# Patient Record
Sex: MALE | Race: WHITE | NOT HISPANIC OR LATINO | Employment: OTHER | URBAN - NONMETROPOLITAN AREA
[De-identification: names, ages, dates, MRNs, and addresses within clinical notes are randomized per-mention and may not be internally consistent; named-entity substitution may affect disease eponyms.]

---

## 2021-11-12 ENCOUNTER — ALLIED HEALTH/NURSE VISIT (OUTPATIENT)
Dept: FAMILY MEDICINE | Facility: OTHER | Age: 71
End: 2021-11-12
Attending: FAMILY MEDICINE
Payer: MEDICARE

## 2021-11-12 DIAGNOSIS — R52 BODY ACHES: Primary | ICD-10-CM

## 2021-11-12 PROCEDURE — C9803 HOPD COVID-19 SPEC COLLECT: HCPCS

## 2021-11-12 PROCEDURE — U0003 INFECTIOUS AGENT DETECTION BY NUCLEIC ACID (DNA OR RNA); SEVERE ACUTE RESPIRATORY SYNDROME CORONAVIRUS 2 (SARS-COV-2) (CORONAVIRUS DISEASE [COVID-19]), AMPLIFIED PROBE TECHNIQUE, MAKING USE OF HIGH THROUGHPUT TECHNOLOGIES AS DESCRIBED BY CMS-2020-01-R: HCPCS | Mod: ZL

## 2021-11-12 NOTE — PROGRESS NOTES
Patient here for Covid Testing. Symptomatic, Body aches.    Irene Guerra MA on 11/12/2021 at 2:20 PM

## 2021-11-15 LAB — SARS-COV-2 RNA RESP QL NAA+PROBE: NEGATIVE

## 2022-08-23 ENCOUNTER — OFFICE VISIT (OUTPATIENT)
Dept: FAMILY MEDICINE | Facility: OTHER | Age: 72
End: 2022-08-23
Attending: PHYSICIAN ASSISTANT
Payer: MEDICARE

## 2022-08-23 VITALS
SYSTOLIC BLOOD PRESSURE: 150 MMHG | RESPIRATION RATE: 16 BRPM | OXYGEN SATURATION: 96 % | HEART RATE: 96 BPM | DIASTOLIC BLOOD PRESSURE: 84 MMHG | TEMPERATURE: 97.8 F | WEIGHT: 244.8 LBS

## 2022-08-23 DIAGNOSIS — Z79.2 NEED FOR PROPHYLACTIC ANTIBIOTIC: ICD-10-CM

## 2022-08-23 DIAGNOSIS — S69.92XA FISH HOOK INJURY OF FINGER OF LEFT HAND, INITIAL ENCOUNTER: Primary | ICD-10-CM

## 2022-08-23 PROCEDURE — 250N000009 HC RX 250: Performed by: NURSE PRACTITIONER

## 2022-08-23 PROCEDURE — 99203 OFFICE O/P NEW LOW 30 MIN: CPT | Performed by: NURSE PRACTITIONER

## 2022-08-23 PROCEDURE — G0463 HOSPITAL OUTPT CLINIC VISIT: HCPCS

## 2022-08-23 PROCEDURE — G0463 HOSPITAL OUTPT CLINIC VISIT: HCPCS | Mod: 25

## 2022-08-23 PROCEDURE — 90715 TDAP VACCINE 7 YRS/> IM: CPT

## 2022-08-23 PROCEDURE — 90471 IMMUNIZATION ADMIN: CPT

## 2022-08-23 RX ORDER — CEPHALEXIN 500 MG/1
500 CAPSULE ORAL 2 TIMES DAILY
Qty: 10 CAPSULE | Refills: 0 | Status: SHIPPED | OUTPATIENT
Start: 2022-08-23 | End: 2022-08-28

## 2022-08-23 RX ORDER — NEOMYCIN/BACITRACIN/POLYMYXINB 3.5-400-5K
OINTMENT (GRAM) TOPICAL ONCE
Status: DISCONTINUED | OUTPATIENT
Start: 2022-08-23 | End: 2022-08-26 | Stop reason: CLARIF

## 2022-08-23 RX ORDER — LIDOCAINE HYDROCHLORIDE 10 MG/ML
5 INJECTION, SOLUTION EPIDURAL; INFILTRATION; INTRACAUDAL; PERINEURAL ONCE
Status: COMPLETED | OUTPATIENT
Start: 2022-08-23 | End: 2022-08-23

## 2022-08-23 RX ADMIN — LIDOCAINE HYDROCHLORIDE 5 ML: 10 INJECTION, SOLUTION EPIDURAL; INFILTRATION; INTRACAUDAL; PERINEURAL at 11:30

## 2022-08-23 ASSESSMENT — PAIN SCALES - GENERAL: PAINLEVEL: NO PAIN (0)

## 2022-08-23 NOTE — NURSING NOTE
Chief Complaint   Patient presents with     Injury     Fish hook in left thumb      Patient presents to the clinic today for a fish hook stuck in his left thumb.       Initial BP (!) 158/88 (BP Location: Right arm, Patient Position: Sitting, Cuff Size: Adult Large)   Pulse 96   Temp 97.8  F (36.6  C) (Tympanic)   Resp 16   Wt 111 kg (244 lb 12.8 oz)   SpO2 96%  There is no height or weight on file to calculate BMI.  Medication Reconciliation: complete    Rea Richardson LPN

## 2022-08-23 NOTE — PATIENT INSTRUCTIONS
TDAP updated today;   Prophylactic antibiotic (cephalexin twice daily x 5 days).   Keep site covered with ointment and bandage.

## 2022-08-23 NOTE — PROGRESS NOTES
ASSESSMENT/PLAN:    I have reviewed the nursing notes.  I have reviewed the findings, diagnosis, plan and need for follow up with the patient.    1. Fish hook injury of finger of left hand, initial encounter  - lidocaine (PF) (XYLOCAINE) 1 % injection 5 mL  - TDAP VACCINE (Adacel, Boostrix)  [[id number]]  - cephALEXin (KEFLEX) 500 MG capsule; Take 1 capsule (500 mg) by mouth 2 times daily for 5 days  Dispense: 10 capsule; Refill: 0  - neomycin-bacitracin-polymyxin (NEOSPORIN) ointment    2. Need for prophylactic antibiotic  - cephALEXin (KEFLEX) 500 MG capsule; Take 1 capsule (500 mg) by mouth 2 times daily for 5 days  Dispense: 10 capsule; Refill: 0  - neomycin-bacitracin-polymyxin (NEOSPORIN) ointment  Based on fishhook being deeply embedded into the thumb, opted to treat with prophylactic antibiotic.  Patient tolerated fishhook removal without issues, see below procedural note.    Discussed warning signs/symptoms indicative of need to f/u    Follow up if symptoms persist or worsen or concerns    I explained my diagnostic considerations and recommendations to the patient, who voiced understanding and agreement with the treatment plan. All questions were answered. We discussed potential side effects of any prescribed or recommended therapies, as well as expectations for response to treatments.    Ai Chapa NP  8/23/2022  10:52 AM    HPI:  Papo Gonzalez is a 72 year old male who presents to [facility] today for concerns of fish hook in left thumb.  This became embedded in his skin about 2 hours ago.  He did cut the end of the fishhook lure prior to coming in.  He is not diabetic, he is from out of the area.  [place] was contaminated, been used prior.  He is behind on his Tdap and agreeable to updating this today.    No past medical history on file.  No past surgical history on file.  Social History     Tobacco Use     Smoking status: Never Smoker     Smokeless tobacco: Never Used   Substance Use Topics      Alcohol use: Yes     No current outpatient medications on file.     No Known Allergies  Past medical history, past surgical history, current medications and allergies reviewed and accurate to the best of my knowledge.      ROS:  Refer to HPI    BP (!) 150/84 (BP Location: Right arm, Patient Position: Sitting, Cuff Size: Adult Large)   Pulse 96   Temp 97.8  F (36.6  C) (Tympanic)   Resp 16   Wt 111 kg (244 lb 12.8 oz)   SpO2 96%     EXAM:  General Appearance: Well appearing 72 year old male, appropriate appearance for age. No acute distress   Respiratory:  No increased work of breathing.  No cough appreciated.  Musculoskeletal:  Equal movement of bilateral upper extremities.  Equal movement of bilateral lower extremities.  Normal gait.    Left thumb: + fish hook odell embedded into pad of finger. Removed without difficulty. No drainage. Full range of motion of the thumb, full sensation.   Psychological: normal affect, alert, oriented, and pleasant.     Procedural note:   Options are discussed and Papo decided to proceed with fish hook removal. Patient was prepped and draped in the proper sterile manner. Wound cleansed with alcohol prep and irrigated well with saline. Anesthesia was obtained using 2 cc of lidocaine without epi. Fish hook removed using pull back technique. Patient tolerated procedure well, no complications appreciated. Sterile dressing applied with bacitracin. Wound dressing and care was discussed.  Given warning signs for infection and instructed to return if they develop. Patient was agreeable to this plan.

## 2022-10-03 ENCOUNTER — HEALTH MAINTENANCE LETTER (OUTPATIENT)
Age: 72
End: 2022-10-03

## 2022-11-30 ASSESSMENT — ENCOUNTER SYMPTOMS
WEAKNESS: 0
PALPITATIONS: 0
FREQUENCY: 0
COUGH: 0
SORE THROAT: 0
NAUSEA: 0
CHILLS: 0
ARTHRALGIAS: 1
DIARRHEA: 0
PARESTHESIAS: 0
HEMATURIA: 0
SHORTNESS OF BREATH: 0
DYSURIA: 0
NERVOUS/ANXIOUS: 0
MYALGIAS: 0
CONSTIPATION: 0
FEVER: 0
HEADACHES: 0
DIZZINESS: 0
JOINT SWELLING: 0
ABDOMINAL PAIN: 0
HEARTBURN: 0
HEMATOCHEZIA: 0
EYE PAIN: 0

## 2022-11-30 ASSESSMENT — ACTIVITIES OF DAILY LIVING (ADL): CURRENT_FUNCTION: NO ASSISTANCE NEEDED

## 2022-12-06 ENCOUNTER — OFFICE VISIT (OUTPATIENT)
Dept: FAMILY MEDICINE | Facility: OTHER | Age: 72
End: 2022-12-06
Attending: FAMILY MEDICINE
Payer: MEDICARE

## 2022-12-06 ENCOUNTER — HOSPITAL ENCOUNTER (OUTPATIENT)
Dept: GENERAL RADIOLOGY | Facility: OTHER | Age: 72
Discharge: HOME | End: 2022-12-06
Attending: FAMILY MEDICINE
Payer: MEDICARE

## 2022-12-06 VITALS
HEIGHT: 67 IN | BODY MASS INDEX: 37.98 KG/M2 | HEART RATE: 112 BPM | DIASTOLIC BLOOD PRESSURE: 90 MMHG | SYSTOLIC BLOOD PRESSURE: 146 MMHG | RESPIRATION RATE: 20 BRPM | WEIGHT: 242 LBS | TEMPERATURE: 98.8 F | OXYGEN SATURATION: 95 %

## 2022-12-06 DIAGNOSIS — Z00.00 ENCOUNTER FOR MEDICARE ANNUAL WELLNESS EXAM: Primary | ICD-10-CM

## 2022-12-06 DIAGNOSIS — M17.0 ARTHRITIS OF BOTH KNEES: ICD-10-CM

## 2022-12-06 DIAGNOSIS — N52.9 ERECTILE DYSFUNCTION, UNSPECIFIED ERECTILE DYSFUNCTION TYPE: ICD-10-CM

## 2022-12-06 DIAGNOSIS — Z86.0100 PERSONAL HISTORY OF COLONIC POLYPS: ICD-10-CM

## 2022-12-06 DIAGNOSIS — Z23 COVID-19 VACCINE ADMINISTERED: ICD-10-CM

## 2022-12-06 DIAGNOSIS — Z13.220 LIPID SCREENING: ICD-10-CM

## 2022-12-06 DIAGNOSIS — S21.202A WOUND OF LEFT SIDE OF BACK, INITIAL ENCOUNTER: ICD-10-CM

## 2022-12-06 DIAGNOSIS — Z13.6 SCREENING FOR AAA (ABDOMINAL AORTIC ANEURYSM): ICD-10-CM

## 2022-12-06 DIAGNOSIS — Z12.5 SCREENING FOR PROSTATE CANCER: ICD-10-CM

## 2022-12-06 DIAGNOSIS — R39.11 URINARY HESITANCY: ICD-10-CM

## 2022-12-06 DIAGNOSIS — Z83.3 FAMILY HISTORY OF DIABETES MELLITUS: ICD-10-CM

## 2022-12-06 LAB
ALBUMIN SERPL BCG-MCNC: 4.4 G/DL (ref 3.5–5.2)
ALP SERPL-CCNC: 74 U/L (ref 40–129)
ALT SERPL W P-5'-P-CCNC: 36 U/L (ref 10–50)
ANION GAP SERPL CALCULATED.3IONS-SCNC: 10 MMOL/L (ref 7–15)
AST SERPL W P-5'-P-CCNC: 26 U/L (ref 10–50)
BILIRUB SERPL-MCNC: 0.5 MG/DL
BUN SERPL-MCNC: 13.5 MG/DL (ref 8–23)
CALCIUM SERPL-MCNC: 9.1 MG/DL (ref 8.8–10.2)
CHLORIDE SERPL-SCNC: 102 MMOL/L (ref 98–107)
CHOLEST SERPL-MCNC: 187 MG/DL
CREAT SERPL-MCNC: 1.13 MG/DL (ref 0.67–1.17)
DEPRECATED HCO3 PLAS-SCNC: 26 MMOL/L (ref 22–29)
GFR SERPL CREATININE-BSD FRML MDRD: 70 ML/MIN/1.73M2
GLUCOSE SERPL-MCNC: 117 MG/DL (ref 70–99)
HBA1C MFR BLD: 5.9 % (ref 4–6.2)
HDLC SERPL-MCNC: 41 MG/DL
LDLC SERPL CALC-MCNC: 117 MG/DL
NONHDLC SERPL-MCNC: 146 MG/DL
POTASSIUM SERPL-SCNC: 4.3 MMOL/L (ref 3.4–5.3)
PROT SERPL-MCNC: 7.7 G/DL (ref 6.4–8.3)
PSA SERPL-MCNC: 3.3 NG/ML (ref 0–6.5)
SODIUM SERPL-SCNC: 138 MMOL/L (ref 136–145)
TRIGL SERPL-MCNC: 145 MG/DL

## 2022-12-06 PROCEDURE — G0463 HOSPITAL OUTPT CLINIC VISIT: HCPCS | Mod: 25

## 2022-12-06 PROCEDURE — G0103 PSA SCREENING: HCPCS | Mod: ZL | Performed by: FAMILY MEDICINE

## 2022-12-06 PROCEDURE — 36415 COLL VENOUS BLD VENIPUNCTURE: CPT | Mod: ZL | Performed by: FAMILY MEDICINE

## 2022-12-06 PROCEDURE — 84155 ASSAY OF PROTEIN SERUM: CPT | Mod: ZL | Performed by: FAMILY MEDICINE

## 2022-12-06 PROCEDURE — 99214 OFFICE O/P EST MOD 30 MIN: CPT | Mod: 25 | Performed by: FAMILY MEDICINE

## 2022-12-06 PROCEDURE — 73564 X-RAY EXAM KNEE 4 OR MORE: CPT | Mod: 50

## 2022-12-06 PROCEDURE — G0439 PPPS, SUBSEQ VISIT: HCPCS | Performed by: FAMILY MEDICINE

## 2022-12-06 PROCEDURE — 91312 COVID-19 VACCINE BIVALENT BOOSTER 12+ (PFIZER): CPT

## 2022-12-06 PROCEDURE — 83036 HEMOGLOBIN GLYCOSYLATED A1C: CPT | Mod: ZL | Performed by: FAMILY MEDICINE

## 2022-12-06 PROCEDURE — 80061 LIPID PANEL: CPT | Mod: ZL | Performed by: FAMILY MEDICINE

## 2022-12-06 RX ORDER — MUPIROCIN 20 MG/G
OINTMENT TOPICAL 2 TIMES DAILY
Qty: 30 G | Refills: 0 | Status: SHIPPED | OUTPATIENT
Start: 2022-12-06 | End: 2023-01-05

## 2022-12-06 RX ORDER — TADALAFIL 5 MG/1
5 TABLET ORAL DAILY PRN
Qty: 10 TABLET | Refills: 11 | Status: SHIPPED | OUTPATIENT
Start: 2022-12-06 | End: 2023-03-07

## 2022-12-06 ASSESSMENT — ENCOUNTER SYMPTOMS
CHILLS: 0
ARTHRALGIAS: 1
HEADACHES: 0
CONSTIPATION: 0
NERVOUS/ANXIOUS: 0
FREQUENCY: 0
HEARTBURN: 0
PARESTHESIAS: 0
HEMATURIA: 0
MYALGIAS: 0
SHORTNESS OF BREATH: 0
COUGH: 0
HEMATOCHEZIA: 0
DYSURIA: 0
DIZZINESS: 0
WEAKNESS: 0
SORE THROAT: 0
DIARRHEA: 0
PALPITATIONS: 0
ABDOMINAL PAIN: 0
NAUSEA: 0
FEVER: 0
JOINT SWELLING: 0
EYE PAIN: 0

## 2022-12-06 ASSESSMENT — ANXIETY QUESTIONNAIRES
2. NOT BEING ABLE TO STOP OR CONTROL WORRYING: NOT AT ALL
IF YOU CHECKED OFF ANY PROBLEMS ON THIS QUESTIONNAIRE, HOW DIFFICULT HAVE THESE PROBLEMS MADE IT FOR YOU TO DO YOUR WORK, TAKE CARE OF THINGS AT HOME, OR GET ALONG WITH OTHER PEOPLE: NOT DIFFICULT AT ALL
6. BECOMING EASILY ANNOYED OR IRRITABLE: NOT AT ALL
GAD7 TOTAL SCORE: 0
4. TROUBLE RELAXING: NOT AT ALL
3. WORRYING TOO MUCH ABOUT DIFFERENT THINGS: NOT AT ALL
5. BEING SO RESTLESS THAT IT IS HARD TO SIT STILL: NOT AT ALL
1. FEELING NERVOUS, ANXIOUS, OR ON EDGE: NOT AT ALL
8. IF YOU CHECKED OFF ANY PROBLEMS, HOW DIFFICULT HAVE THESE MADE IT FOR YOU TO DO YOUR WORK, TAKE CARE OF THINGS AT HOME, OR GET ALONG WITH OTHER PEOPLE?: NOT DIFFICULT AT ALL
GAD7 TOTAL SCORE: 0
7. FEELING AFRAID AS IF SOMETHING AWFUL MIGHT HAPPEN: NOT AT ALL
7. FEELING AFRAID AS IF SOMETHING AWFUL MIGHT HAPPEN: NOT AT ALL
GAD7 TOTAL SCORE: 0

## 2022-12-06 ASSESSMENT — ACTIVITIES OF DAILY LIVING (ADL): CURRENT_FUNCTION: NO ASSISTANCE NEEDED

## 2022-12-06 ASSESSMENT — PAIN SCALES - GENERAL: PAINLEVEL: MODERATE PAIN (4)

## 2022-12-06 ASSESSMENT — PATIENT HEALTH QUESTIONNAIRE - PHQ9
SUM OF ALL RESPONSES TO PHQ QUESTIONS 1-9: 1
SUM OF ALL RESPONSES TO PHQ QUESTIONS 1-9: 1
10. IF YOU CHECKED OFF ANY PROBLEMS, HOW DIFFICULT HAVE THESE PROBLEMS MADE IT FOR YOU TO DO YOUR WORK, TAKE CARE OF THINGS AT HOME, OR GET ALONG WITH OTHER PEOPLE: NOT DIFFICULT AT ALL

## 2022-12-06 NOTE — PATIENT INSTRUCTIONS
Start mupirocin ointment to help heal the back wound  See general surgery if not improving    Colonoscopy due with history of polyps    Referral to orthopedic surgery    Cialis as needed       Patient Education   Personalized Prevention Plan  You are due for the preventive services outlined below.  Your care team is available to assist you in scheduling these services.  If you have already completed any of these items, please share that information with your care team to update in your medical record.  Health Maintenance Due   Topic Date Due    Hepatitis C Screening  Never done    Cholesterol Lab  Never done    Zoster (Shingles) Vaccine (1 of 2) Never done    Annual Wellness Visit  Never done    Pneumococcal Vaccine (1 - PCV) Never done    AORTIC ANEURYSM SCREENING (SYSTEM ASSIGNED)  Never done    COVID-19 Vaccine (3 - Booster for Moderna series) 05/23/2021    Flu Vaccine (1) Never done       Exercise for a Healthier Heart  You may wonder how you can improve the health of your heart. If you re thinking about exercise, you re on the right track. You don t need to become an athlete. But you do need a certain amount of brisk exercise to help strengthen your heart. If you have been diagnosed with a heart condition, your healthcare provider may advise exercise to help stabilize your condition. To help make exercise a habit, choose safe, fun activities.      Exercise with a friend. When activity is fun, you're more likely to stick with it.   Before you start  Check with your healthcare provider before starting an exercise program. This is especially important if you have not been active for a while. It's also important if you have a long-term (chronic) health problem such as heart disease, diabetes, or obesity. Or if you are at high risk for having these problems.   Why exercise?  Exercising regularly offers many healthy rewards. It can help you do all of the following:   Improve your blood cholesterol level to help  prevent further heart trouble  Lower your blood pressure to help prevent a stroke or heart attack  Control diabetes, or reduce your risk of getting this disease  Improve your heart and lung function  Reach and stay at a healthy weight  Make your muscles stronger so you can stay active  Prevent falls and fractures by slowing the loss of bone mass (osteoporosis)  Manage stress better  Reduce your blood pressure  Improve your sense of self and your body image  Exercise tips    Ease into your routine. Set small goals. Then build on them. If you are not sure what your activity level should be, talk with your healthcare provider first before starting an exercise routine.  Exercise on most days. Aim for a total of 150 minutes (2 hours and 30 minutes) or more of moderate-intensity aerobic activity each week. Or 75 minutes (1 hour and 15 minutes) or more of vigorous-intensity aerobic activity each week. Or try for a combination of both. Moderate activity means that you breathe heavier and your heart rate increases but you can still talk. Think about doing 40 minutes of moderate exercise, 3 to 4 times a week. For best results, activity should last for about 40 minutes to lower blood pressure and cholesterol. It's OK to work up to the 40-minute period over time. Examples of moderate-intensity activity are walking 1 mile in 15 minutes. Or doing 30 to 45 minutes of yard work.  Step up your daily activity level.  Along with your exercise program, try being more active the whole day. Walk instead of drive. Or park further away so that you take more steps each day. Do more household tasks or yard work. You may not be able to meet the advised mount of physical activity. But doing some moderate- or vigorous-intensity aerobic activity can help reduce your risk for heart disease. Your healthcare provider can help you figure out what is best for you.  Choose 1 or more activities you enjoy.  Walking is one of the easiest things you can  do. You can also try swimming, riding a bike, dancing, or taking an exercise class.    When to call your healthcare provider  Call your healthcare provider if you have any of these:   Chest pain or feel dizzy or lightheaded  Burning, tightness, pressure, or heaviness in your chest, neck, shoulders, back, or arms  Abnormal shortness of breath  More joint or muscle pain  A very fast or irregular heartbeat (palpitations)  [employer] last reviewed this educational content on 7/1/2019 2000-2021 [employer]. All rights reserved. This information is not intended as a substitute for professional medical care. Always follow your healthcare professional's instructions.          Signs of Hearing Loss      Hearing much better with one ear can be a sign of hearing loss.   Hearing loss is a problem shared by many people. In fact, it is one of the most common health problems, particularly as people age. Most people age 65 and older have some hearing loss. By age 80, almost everyone does. Hearing loss often occurs slowly over the years. So you may not realize your hearing has gotten worse.  Have your hearing checked  Call your healthcare provider if you:  Have to strain to hear normal conversation  Have to watch other people s faces very carefully to follow what they re saying  Need to ask people to repeat what they ve said  Often misunderstand what people are saying  Turn the volume of the television or radio up so high that others complain  Feel that people are mumbling when they re talking to you  Find that the effort to hear leaves you feeling tired and irritated  Notice, when using the phone, that you hear better with one ear than the other  [employer] last reviewed this educational content on 1/1/2020 2000-2021 [employer]. All rights reserved. This information is not intended as a substitute for professional medical care. Always follow your healthcare professional's instructions.

## 2022-12-06 NOTE — NURSING NOTE
"Patient presents to the clinic for medicare wellness/[facility] care.    FOOD SECURITY SCREENING QUESTIONS:    The next two questions are to help us understand your food security.  If you are feeling you need any assistance in this area, we have resources available to support you today.    Hunger Vital Signs:  Within the past 12 months we worried whether our food would run out before we got money to buy more. Never  Within the past 12 months the food we bought just didn't last and we didn't have money to get more. Never    Advance Care Directive on file? no  Advance Care Directive provided to patient? Yes    Chief Complaint   Patient presents with     Establish Care Medicare Visit     Wellness       Derm Problem       Initial BP (!) 146/90   Pulse 112   Temp 98.8  F (37.1  C) (Tympanic)   Resp 20   Ht 1.689 m (5' 6.5\")   Wt 109.8 kg (242 lb)   SpO2 95%   BMI 38.47 kg/m   Estimated body mass index is 38.47 kg/m  as calculated from the following:    Height as of this encounter: 1.689 m (5' 6.5\").    Weight as of this encounter: 109.8 kg (242 lb).  Medication Reconciliation: complete        Clara Montes De Oca LPN       "

## 2022-12-06 NOTE — PROGRESS NOTES
"SUBJECTIVE:   Papo is a 72 year old who presents for Preventive Visit.    Patient has been advised of split billing requirements and indicates understanding: Yes  Are you in the first 12 months of your Medicare coverage?  No    Healthy Habits:     In general, how would you rate your overall health?  Good    Frequency of exercise:  None    Do you usually eat at least 4 servings of fruit and vegetables a day, include whole grains    & fiber and avoid regularly eating high fat or \"junk\" foods?  Yes    Taking medications regularly:  Not Applicable    Medication side effects:  Not applicable    Ability to successfully perform activities of daily living:  No assistance needed    Home Safety:  No safety concerns identified    Hearing Impairment:  Difficulty following a conversation in a noisy restaurant or crowded room    In the past 6 months, have you been bothered by leaking of urine?  No    In general, how would you rate your overall mental or emotional health?  Excellent      PHQ-2 Total Score: 0    Additional concerns today:  Yes    Answers for HPI/ROS submitted by the patient on 12/6/2022  If you checked off any problems, how difficult have these problems made it for you to do your work, take care of things at home, or get along with other people?: Not difficult at all  PHQ9 TOTAL SCORE: 1  ALE 7 TOTAL SCORE: 0      Have you ever done Advance Care Planning? (For example, a Health Directive, POLST, or a discussion with a medical provider or your loved ones about your wishes): No, advance care planning information given to patient to review.  Advanced care planning was discussed at today's visit.       Fall risk  Fallen 2 or more times in the past year?: No  Any fall with injury in the past year?: No    Cognitive Screening   1) Repeat 3 items (Leader, Season, Table)    2) Clock draw: NORMAL  3) 3 item recall: Recalls 3 objects  Results: 3 items recalled: COGNITIVE IMPAIRMENT LESS LIKELY    Mini-CogTM Copyright S " Kiara. Licensed by the author for use in [facility]; reprinted with permission (deven@[email or web address]). All rights reserved.      Do you have sleep apnea, excessive snoring or daytime drowsiness?: no    Reviewed and updated as needed this visit by clinical staff   Tobacco  Allergies  Meds  Problems  Med Hx  Surg Hx  Fam Hx  Soc   Hx        Reviewed and updated as needed this visit by Provider   Tobacco  Allergies  Meds  Problems  Med Hx  Surg Hx  Fam Hx         Social History     Tobacco Use     Smoking status: Never     Passive exposure: Never     Smokeless tobacco: Never   Substance Use Topics     Alcohol use: Yes     Comment: 1 beer per week         Alcohol Use 11/30/2022   Prescreen: >3 drinks/day or >7 drinks/week? No               Current providers sharing in care for this patient include:   Patient Care Team:  Zack Roa MD as PCP - General (Family Medicine)  Ai Chapa NP as Assigned PCP    The following health maintenance items are reviewed in Epic and correct as of today:  Health Maintenance   Topic Date Due     HEPATITIS C SCREENING  Never done     ZOSTER IMMUNIZATION (1 of 2) Never done     MEDICARE ANNUAL WELLNESS VISIT  Never done     Pneumococcal Vaccine: 65+ Years (1 - PCV) Never done     INFLUENZA VACCINE (1) Never done     FALL RISK ASSESSMENT  12/06/2023     COLORECTAL CANCER SCREENING  07/29/2026     LIPID  12/06/2027     ADVANCE CARE PLANNING  12/06/2027     DTAP/TDAP/TD IMMUNIZATION (2 - Td or Tdap) 08/23/2032     PHQ-2 (once per calendar year)  Completed     AORTIC ANEURYSM SCREENING (SYSTEM ASSIGNED)  Completed     COVID-19 Vaccine  Completed     IPV IMMUNIZATION  Aged Out     MENINGITIS IMMUNIZATION  Aged Out     There is no problem list on file for this patient.    Past Surgical History:   Procedure Laterality Date     COLONOSCOPY  07/29/2016    tubular adenomas, repeat 2021       Social History     Tobacco Use     Smoking status: Never     Passive exposure:  "Never     Smokeless tobacco: Never   Substance Use Topics     Alcohol use: Yes     Comment: 1 beer per week     Family History   Problem Relation Age of Onset     Dementia Mother      Prostate Cancer Father      Diabetes Sister      Prostate Cancer Paternal Uncle      Heart Disease No family hx of          Current Outpatient Medications   Medication Sig Dispense Refill     mupirocin (BACTROBAN) 2 % external ointment Apply topically 2 times daily for 30 days 30 g 0     tadalafil (CIALIS) 5 MG tablet Take 1 tablet (5 mg) by mouth daily as needed (ED) 10 tablet 11     No Known Allergies      Review of Systems   Constitutional: Negative for chills and fever.   HENT: Negative for congestion, ear pain, hearing loss and sore throat.    Eyes: Negative for pain and visual disturbance.   Respiratory: Negative for cough and shortness of breath.    Cardiovascular: Negative for chest pain, palpitations and peripheral edema.   Gastrointestinal: Negative for abdominal pain, constipation, diarrhea, heartburn, hematochezia and nausea.   Genitourinary: Positive for impotence and urgency. Negative for dysuria, frequency, genital sores, hematuria and penile discharge.   Musculoskeletal: Positive for arthralgias. Negative for joint swelling and myalgias.   Skin: Negative for rash.   Neurological: Negative for dizziness, weakness, headaches and paresthesias.   Psychiatric/Behavioral: Negative for mood changes. The patient is not nervous/anxious.        OBJECTIVE:   BP (!) 146/90   Pulse 112   Temp 98.8  F (37.1  C) (Tympanic)   Resp 20   Ht 1.689 m (5' 6.5\")   Wt 109.8 kg (242 lb)   SpO2 95%   BMI 38.47 kg/m   Estimated body mass index is 38.47 kg/m  as calculated from the following:    Height as of this encounter: 1.689 m (5' 6.5\").    Weight as of this encounter: 109.8 kg (242 lb).  Physical Exam  General Appearance: Pleasant, alert, appropriate appearance for age. No acute distress  Eye Exam:  Normal external eye, conjunctiva, " lids, cornea. RASHAUN.  Ear Exam: Normal TM's bilaterally. Normal auditory canals and external ears.  OroPharynx Exam:  Dental hygiene adequate. Normal buccal mucosa. Normal pharynx.  Neck Exam:  Supple, no masses or nodes.  Thyroid Exam: No nodules or enlargement.  Chest/Respiratory Exam: Normal chest wall and respirations. Clear to auscultation.  Cardiovascular Exam: Regular rate and rhythm. S1, S2, no murmur, click, gallop, or rubs.  Gastrointestinal Exam: Soft, non-tender, no masses or organomegaly.  Musculoskeletal Exam: Bowlegge, arthritic appearing  Foot Exam: Left and right foot: good pedal pulses.  Skin: Wound over a potential previous cyst on left low back  Neurologic Exam: Nonfocal, symmetric DTRs, normal gross motor, tone coordination and no tremor.  Psychiatric Exam: Alert and oriented - appropriate affect.          Results for orders placed or performed during the hospital encounter of 12/06/22   XR Knee Standing 2v Bilateral & 2v Bilateral     Status: None    Narrative    Bilateral knees    history: Bilateral knee pain    TECHNIQUE: 4 views of both knees were obtained.    FINDINGS: There is severe joint space narrowing at the medial femoral  tibial articulations bilaterally. The lateral femoral tibial and  patellofemoral articulations are normal in height. There is no knee  effusion. The distal femur patella proximal tibia and fibula are  intact.      Impression    IMPRESSION: Advanced osteoarthritic changes of the medial femoral  tibial articulations bilaterally    SHANTHI ELLIS MD         SYSTEM ID:  [id number]   Results for orders placed or performed in visit on 12/06/22   PSA Screen GH     Status: Normal   Result Value Ref Range    Prostate Specific Antigen Screen 3.30 0.00 - 6.50 ng/mL    Narrative    This result is obtained using the Roche Elecsys total PSA method on the maria ines [id number] immunoassay analyzer. Results obtained with different assay methods or kits cannot be used interchangeably.   Lipid  Panel     Status: Abnormal   Result Value Ref Range    Cholesterol 187 <200 mg/dL    Triglycerides 145 <150 mg/dL    Direct Measure HDL 41 >=40 mg/dL    LDL Cholesterol Calculated 117 (H) <=100 mg/dL    Non HDL Cholesterol 146 (H) <130 mg/dL    Narrative    Cholesterol  Desirable:  <200 mg/dL    Triglycerides  Normal:  Less than 150 mg/dL  Borderline High:  150-199 mg/dL  High:  200-499 mg/dL  Very High:  Greater than or equal to 500 mg/dL    Direct Measure HDL  Female:  Greater than or equal to 50 mg/dL   Male:  Greater than or equal to 40 mg/dL    LDL Cholesterol  Desirable:  <100mg/dL  Above Desirable:  100-129 mg/dL   Borderline High:  130-159 mg/dL   High:  160-189 mg/dL   Very High:  >= 190 mg/dL    Non HDL Cholesterol  Desirable:  130 mg/dL  Above Desirable:  130-159 mg/dL  Borderline High:  160-189 mg/dL  High:  190-219 mg/dL  Very High:  Greater than or equal to 220 mg/dL   Hemoglobin A1c     Status: Normal   Result Value Ref Range    Hemoglobin A1C 5.9 4.0 - 6.2 %   Comprehensive Metabolic Panel     Status: Abnormal   Result Value Ref Range    Sodium 138 136 - 145 mmol/L    Potassium 4.3 3.4 - 5.3 mmol/L    Chloride 102 98 - 107 mmol/L    Carbon Dioxide (CO2) 26 22 - 29 mmol/L    Anion Gap 10 7 - 15 mmol/L    Urea Nitrogen 13.5 8.0 - 23.0 mg/dL    Creatinine 1.13 0.67 - 1.17 mg/dL    Calcium 9.1 8.8 - 10.2 mg/dL    Glucose 117 (H) 70 - 99 mg/dL    Alkaline Phosphatase 74 40 - 129 U/L    AST 26 10 - 50 U/L    ALT 36 10 - 50 U/L    Protein Total 7.7 6.4 - 8.3 g/dL    Albumin 4.4 3.5 - 5.2 g/dL    Bilirubin Total 0.5 <=1.2 mg/dL    GFR Estimate 70 >60 mL/min/1.73m2        ASSESSMENT / PLAN:       ICD-10-CM    1. Encounter for Medicare annual wellness exam  Z00.00       2. Family history of diabetes mellitus  Z83.3 Hemoglobin A1c     Comprehensive Metabolic Panel     Hemoglobin A1c     Comprehensive Metabolic Panel      3. Lipid screening  Z13.220 Lipid Panel     Lipid Panel      4. Screening for prostate  cancer  Z12.5 PSA Screen GH     PSA Screen GH      5. Urinary hesitancy  R39.11       6. Erectile dysfunction, unspecified erectile dysfunction type  N52.9 tadalafil (CIALIS) 5 MG tablet      7. Personal history of colonic polyps  Z86.010 Colonoscopy Screening  Referral      8. Arthritis of both knees  M17.0 XR Knee Standing 2v Bilateral & 2v Bilateral     Orthopedic  Referral      9. Wound of left side of back, initial encounter  S21.202A mupirocin (BACTROBAN) 2 % external ointment     Adult General Surg Referral      10. Screening for AAA (abdominal aortic aneurysm)  Z13.6 Abdominal Aortic Aneurysm Screening/Tracking      11. COVID-19 vaccine administered  Z23 COVID-19 VACCINE BIVALENT BOOSTER 12+ (PFIZER)            Patient has been advised of split billing requirements and indicates understanding: Yes    Has not seen medical provider in a number of years.  Last physical 6 years ago.    A1c obtained due to family history of diabetes and is in the prediabetes range.  His wife is diabetic.  We discussed need to change diet.  She cooks 2 different types of food, one for her and 1 for him.  He should be following her diet.  Work on activity and weight loss.    Has family history of prostate cancer.  PSA comparable to previous.  Having some urinary symptoms.  We discussed Flomax, but he also has CAD.  PDE inhibitors interacts with alpha blockers.  He chooses to try tadalafil instead.  Is aware if symptoms worsen that he will need to start something for urinary hesitancy in order to avoid urinary retention.    Multiple tubular adenomas on colonoscopy in 2016.  Due for repeat colonoscopy.  Referral placed    He is bowlegged with knee x-ray showing expected loss of medial joint space.  We reviewed options for arthritis.  He qualifies for knee replacement and is most interested in pursuing this route.  Referral placed to orthopedics.    Has a nonhealing wound in the left low back.  Most consistent with  "sebaceous cyst that has never fully healed.  He has been trying Neosporin.  Recommend use of mupirocin twice daily on a consistent basis.  If it does not seem to be healing in the next month, he should see general surgery for excision.    COUNSELING:  Reviewed preventive health counseling, as reflected in patient instructions       Consider AAA screening for ages 65-75 and smoking history - no smoking history or family history       Regular exercise       Healthy diet/nutrition       Vision screening       Hearing screening       Declines hep C screen       Immunizations    Vaccinated for: Covid-19      Declines pneumonia and influenza vaccines    Recommend Shingrix, declines to pursue       Colon cancer screening - as above      Elevated ASCVD risk or, but primarily due to age.  With LDL of 117 does not warrant a statin.  Additionally, he is not inclined to start any medication.  The 10-year ASCVD risk score (Yasir CONNOR, et al., 2019) is: 23.4%    Values used to calculate the score:      Age: 72 years      Sex: Male      Is Non- : No      Diabetic: No      Tobacco smoker: No      Systolic Blood Pressure: 146 mmHg      Is BP treated: No      HDL Cholesterol: 41 mg/dL      Total Cholesterol: 187 mg/dL       BMI:   Estimated body mass index is 38.47 kg/m  as calculated from the following:    Height as of this encounter: 1.689 m (5' 6.5\").    Weight as of this encounter: 109.8 kg (242 lb).   Weight management plan: Discussed healthy diet and exercise guidelines      He reports that he has never smoked. He has never been exposed to tobacco smoke. He has never used smokeless tobacco.      Appropriate preventive services were discussed with this patient, including applicable screening as appropriate for cardiovascular disease, diabetes, osteopenia/osteoporosis, and glaucoma.  As appropriate for age/gender, discussed screening for colorectal cancer, prostate cancer, breast cancer, and cervical " cancer. Checklist reviewing preventive services available has been given to the patient.    Reviewed patients plan of care and provided an AVS. The Basic Care Plan (routine screening as documented in Health Maintenance) for Papo meets the Care Plan requirement. This Care Plan has been established and reviewed with the Patient.          Zack Roa MD  [facility]    Identified Health Risks:    He is at risk for lack of exercise and has been provided with information to increase physical activity for the benefit of his well-being.  The patient was provided with written information regarding signs of hearing loss.

## 2022-12-14 ENCOUNTER — OFFICE VISIT (OUTPATIENT)
Dept: SURGERY | Facility: OTHER | Age: 72
End: 2022-12-14
Attending: SURGERY
Payer: MEDICARE

## 2022-12-14 VITALS
TEMPERATURE: 97.6 F | DIASTOLIC BLOOD PRESSURE: 92 MMHG | WEIGHT: 239.4 LBS | BODY MASS INDEX: 38.06 KG/M2 | SYSTOLIC BLOOD PRESSURE: 160 MMHG | HEART RATE: 103 BPM | OXYGEN SATURATION: 99 % | RESPIRATION RATE: 20 BRPM

## 2022-12-14 DIAGNOSIS — L98.9 SKIN LESION OF BACK: Primary | ICD-10-CM

## 2022-12-14 PROBLEM — Z86.0101 H/O ADENOMATOUS POLYP OF COLON: Status: ACTIVE | Noted: 2022-12-14

## 2022-12-14 PROCEDURE — G0463 HOSPITAL OUTPT CLINIC VISIT: HCPCS

## 2022-12-14 PROCEDURE — 88305 TISSUE EXAM BY PATHOLOGIST: CPT

## 2022-12-14 PROCEDURE — 11603 EXC TR-EXT MAL+MARG 2.1-3 CM: CPT | Performed by: SURGERY

## 2022-12-14 PROCEDURE — 11403 EXC TR-EXT B9+MARG 2.1-3CM: CPT | Performed by: SURGERY

## 2022-12-14 ASSESSMENT — PAIN SCALES - GENERAL: PAINLEVEL: NO PAIN (0)

## 2022-12-14 NOTE — PROGRESS NOTES
Procedure Note  Pre/Post Operative Diagnosis:   2.2 cm ( with margins) ulcerated skin lesion left lower back    Procedure:    Excision    Surgeon: Papo Levi MD FACS    Local Anesthesia: 1% lidocaine with0.25%Marcaine with epinephrine    Indication for the procedure:    This is a 72 year old male patient referred by Dr Roa with an ulcerated skin lesion of left lower back x 2 years.   After explaining the risks to include bleeding, infection, recurrence or need for re-excision,and scarring the patient wished to proceed.    Procedure:   The area was prepped and draped in usual sterile fashion with ChloraPrep . After, adequate local anesthesia,an elliptical skin incision was made to encompass the lesion. Dermis was approximated with 3-0 Vicryl sutures. The skin was closed with 5-0 Maxon.  A proxi-strip and a clean dry dressing was applied.    Plan:   Patient will followup if there any problems with the wound including redness or drainage.

## 2022-12-14 NOTE — NURSING NOTE
"Chief Complaint   Patient presents with     Derm Problem     Here today with a sore on his left upper buttocks.  This has been present for about two years.       Initial BP (!) 150/96 (BP Location: Left arm, Patient Position: Sitting, Cuff Size: Adult Large)   Pulse 103   Temp 97.6  F (36.4  C) (Tympanic)   Resp 20   Wt 108.6 kg (239 lb 6.4 oz)   SpO2 99%   BMI 38.06 kg/m   Estimated body mass index is 38.06 kg/m  as calculated from the following:    Height as of 12/6/22: 1.689 m (5' 6.5\").    Weight as of this encounter: 108.6 kg (239 lb 6.4 oz).  Medication Reconciliation: complete    Yuki Bueno LPN  "

## 2022-12-14 NOTE — NURSING NOTE
"Chief Complaint   Patient presents with     Derm Problem     Here today with a sore on his left upper buttocks.  This has been present for about two years.       Initial BP (!) 160/92 (BP Location: Left arm, Patient Position: Sitting, Cuff Size: Adult Large)   Pulse 103   Temp 97.6  F (36.4  C) (Tympanic)   Resp 20   Wt 108.6 kg (239 lb 6.4 oz)   SpO2 99%   BMI 38.06 kg/m   Estimated body mass index is 38.06 kg/m  as calculated from the following:    Height as of 12/6/22: 1.689 m (5' 6.5\").    Weight as of this encounter: 108.6 kg (239 lb 6.4 oz).  Medication Reconciliation: complete    Yuki Bueno LPN     TIMEOUT  [place] Protocol    A. Pre-procedure verification complete     1-relevant information / documentation available, reviewed and properly matched to the patient; 2-consent accurate and complete, 3-equipment and supplies available    B. Site marking complete     Site marked if not in continuous attendance with patient    C. TIME OUT completed     Time Out was conducted just prior to starting procedure to verify the eight required elements: 1-patient identity, 2-consent accurate and complete, 3-position, 4-correct side/site marked (if applicable), 5-procedure, 6-relevant images / results properly labeled and displayed (if applicable), 7-antibiotics / irrigation fluids (if applicable), 8-safety precautions.  "

## 2022-12-14 NOTE — PATIENT INSTRUCTIONS
Your incision was closed with stitches that will dissolve.    It is ok to remove the dressing and get the incision wet in the shower on the day after your procedure.     Don't soak in a tub, pool or lake for 1 week.  The small butterfly strips will start to peel off in 5-7 days it is ok to remove them. If you have concerns, please call.

## 2022-12-21 PROBLEM — Z85.828 H/O BASAL CELL CARCINOMA EXCISION: Status: ACTIVE | Noted: 2022-12-14

## 2022-12-21 PROBLEM — Z98.890 H/O BASAL CELL CARCINOMA EXCISION: Status: ACTIVE | Noted: 2022-12-14

## 2022-12-21 LAB
PATH REPORT.COMMENTS IMP SPEC: NORMAL
PATH REPORT.FINAL DX SPEC: NORMAL
PHOTO IMAGE: NORMAL

## 2023-01-24 ENCOUNTER — OFFICE VISIT (OUTPATIENT)
Dept: ORTHOPEDICS | Facility: OTHER | Age: 73
End: 2023-01-24
Attending: ORTHOPAEDIC SURGERY
Payer: MEDICARE

## 2023-01-24 VITALS
OXYGEN SATURATION: 98 % | SYSTOLIC BLOOD PRESSURE: 142 MMHG | WEIGHT: 239 LBS | HEART RATE: 116 BPM | DIASTOLIC BLOOD PRESSURE: 102 MMHG | BODY MASS INDEX: 38 KG/M2

## 2023-01-24 DIAGNOSIS — M25.562 CHRONIC PAIN OF BOTH KNEES: Primary | ICD-10-CM

## 2023-01-24 DIAGNOSIS — M25.561 CHRONIC PAIN OF BOTH KNEES: Primary | ICD-10-CM

## 2023-01-24 DIAGNOSIS — G89.29 CHRONIC PAIN OF BOTH KNEES: Primary | ICD-10-CM

## 2023-01-24 PROCEDURE — 99204 OFFICE O/P NEW MOD 45 MIN: CPT | Performed by: ORTHOPAEDIC SURGERY

## 2023-01-24 PROCEDURE — G0463 HOSPITAL OUTPT CLINIC VISIT: HCPCS

## 2023-01-24 ASSESSMENT — PAIN SCALES - GENERAL: PAINLEVEL: MODERATE PAIN (5)

## 2023-01-24 NOTE — PROGRESS NOTES
Visit Date: 01/24/2023    REASON FOR EVALUATION:  Bilateral knee pain.    HISTORY OF PRESENT ILLNESS:  Papo comes in with bilateral knee pain.  It has been ongoing here for multiple years, getting worse over the past year or thereabouts. Left knee more symptomatic than right, worse with activity, better with rest.  Has tried cortisone previously; it was ineffective for him.  Does occasional anti-inflammatories.  He is here for next steps and recommendations.  Had x-rays done that show bone-on-bone arthrosis, medial compartment, as well as patellofemoral joint.  Denies any issues with blood clotting in the past.  Has not had any issues with surgeries as well.    MEDICATIONS:  Reviewed.    ALLERGIES:  NO KNOWN DRUG ALLERGIES.    REVIEW OF SYSTEMS:  A 12-point otherwise negative with the exception as stated above.    PHYSICAL EXAMINATION:  The patient is 5 feet 6 inches, 239 pounds.  Alert and oriented x 3, cooperative with exam, in no acute distress.  Does ambulate with some gait antalgia.  Affect is appropriate as well.  Examination of both knee shows varus deformity.  Range of motion -5 to 120.  Limb exam stable and balanced.  Neurovascular examination intact.  Kneecap tracking is acceptable, but with significant crepitation here at this point in time.  Overall, quad strength is otherwise 5/5 here as well in addition to that.    IMAGING:  X-rays reviewed show end-stage arthrosis, medial compartment, as well as patellofemoral joint.    IMPRESSION:  Bilateral knee osteoarthrosis, severe.    PLAN:  At this time, recommendation is that he proceed forward with joint reconstruction.  Plan for left side to start with a Qiana Persona posterior stabilized, cemented in nature.  We will look at spring timeframe for that and then likely look at the fall for his contralateral knee.  Aspirin for DVT prophylaxis.  Outpatient therapy at [facility].  All questions answered as it pertains to that intervention.    Caleb Garcia,  MD        D: 2023   T: 2023   MT: MARA    Name:     YESENIA FONG  MRN:      [id number]        Account:    [id number]   :      1950           Visit Date: 2023     Document: [id number]

## 2023-01-24 NOTE — PROGRESS NOTES
Patient is here for consult on his knees.  Frieda Corona LPN .....................1/24/2023 10:45 AM

## 2023-03-06 NOTE — PROGRESS NOTES
[facility] AND [facility]  [address]  [place] [address]  Phone: [phone]  Fax: [phone]  Primary Provider: Zack Roa  Pre-op Performing Provider: ZACK ROA      PREOPERATIVE EVALUATION:  Today's date: 3/7/2023    Papo Gonzalez is a 72 year old male who presents for a preoperative evaluation.    Surgical Information:  Surgery/Procedure: ARTHROPLASTY, KNEE, TOTAL  Surgery Location: [facility]  Surgeon: Radha  Surgery Date: 3-21-23  Time of Surgery: unknown  Where patient plans to recover: At home with family  Fax number for surgical facility: Note does not need to be faxed, will be available electronically in Epic.    Type of Anesthesia Anticipated: Spinal with Block    Assessment & Plan     The proposed surgical procedure is considered INTERMEDIATE risk.      ICD-10-CM    1. Pre-operative clearance  Z01.818 EKG 12-lead, tracing only     CBC W PLT No Diff     CBC W PLT No Diff      2. Arthritis of both knees  M17.0       3. Urinary hesitancy  R39.11 tamsulosin (FLOMAX) 0.4 MG capsule      4. H/O adenomatous polyp of colon  Z86.010       5. Prediabetes  R73.03           Urinary urgency and hesitancy. This makes post-op urinary retention more likely. Recommend starting tamsulosin 0.4 mg daily to help symptoms and theoretically reduce need for a catheter after joint replacement.     History of adenomatous polyp. He is due for colonoscopy, but deferred until after joint replacement. Reviewed with ortho if acceptable order, should be fine to have TKA and then colonoscopy without increased risk for joint infection    A1c 5.9% on last labs. He is working to modify diet.    Risks and Recommendations:  The patient has the following additional risks and recommendations for perioperative complications:   - No identified additional risk factors other than previously addressed    Medication Instructions:  Patient is on no chronic medications   Tamsulosin just started, may continue  through surgery    RECOMMENDATION:  APPROVAL GIVEN to proceed with proposed procedure, without further diagnostic evaluation.    Zack Roa MD         Subjective     HPI related to upcoming procedure:   bilateral knee arthritis  Planning left side now, right in the fall    Never picked up cialis due to cost  Has a lot of urinary hesitency    Preop Questions 2/28/2023   1. Have you ever had a heart attack or stroke? No   2. Have you ever had surgery on your heart or blood vessels, such as a stent placement, a coronary artery bypass, or surgery on an artery in your head, neck, heart, or legs? No   3. Do you have chest pain with activity? No   4. Do you have a history of  heart failure? No   5. Do you currently have a cold, bronchitis or symptoms of other infection? No   6. Do you have a cough, shortness of breath, or wheezing? No   7. Do you or anyone in your family have previous history of blood clots? No   8. Do you or does anyone in your family have a serious bleeding problem such as prolonged bleeding following surgeries or cuts? No   9. Have you ever had problems with anemia or been told to take iron pills? No   10. Have you had any abnormal blood loss such as black, tarry or bloody stools? No   11. Have you ever had a blood transfusion? No   12. Are you willing to have a blood transfusion if it is medically needed before, during, or after your surgery? Yes   13. Have you or any of your relatives ever had problems with anesthesia? No   14. Do you have sleep apnea, excessive snoring or daytime drowsiness? No   15. Do you have any artifical heart valves or other implanted medical devices like a pacemaker, defibrillator, or continuous glucose monitor? No   16. Do you have artificial joints? No   17. Are you allergic to latex? No       Preoperative Review of :   reviewed - no record of controlled substances prescribed.      Review of Systems  General: Denies general constitutional problems  Cardiovascular:  "Denies problems  Respiratory: Denies problems     Patient Active Problem List    Diagnosis Date Noted     H/O adenomatous polyp of colon 12/14/2022     Priority: Medium     H/O basal cell carcinoma excision 12/14/2022     Priority: Medium      No past medical history on file.  Past Surgical History:   Procedure Laterality Date     COLONOSCOPY  07/29/2016    tubular adenomas, repeat 2021     Current Outpatient Medications   Medication Sig Dispense Refill     tadalafil (CIALIS) 5 MG tablet Take 1 tablet (5 mg) by mouth daily as needed (ED) (Patient not taking: Reported on 1/24/2023) 10 tablet 11       No Known Allergies     Social History     Tobacco Use     Smoking status: Never     Passive exposure: Never     Smokeless tobacco: Never   Substance Use Topics     Alcohol use: Yes     Comment: 1 beer per week     Family History   Problem Relation Age of Onset     Dementia Mother      Prostate Cancer Father      Diabetes Sister      Prostate Cancer Paternal Uncle      Heart Disease No family hx of      History   Drug Use Unknown         Objective     BP (!) 144/92 (BP Location: Right arm, Patient Position: Sitting, Cuff Size: Adult Large)   Pulse 89   Temp 98.5  F (36.9  C) (Tympanic)   Resp 18   Ht 1.683 m (5' 6.25\")   Wt 107.5 kg (237 lb)   .3 cm (66.25\")   SpO2 95%   BMI 37.96 kg/m      Physical Exam  General Appearance: Pleasant, alert, appropriate appearance for age. No acute distress  Ear Exam: Normal TM's bilaterally.   OroPharynx Exam: Dental hygiene adequate. Normal buccal mucosa. Normal pharynx. Mallampati 2/4.  Neck Exam: Supple, no masses or nodes.  Chest/Respiratory Exam: Normal chest wall and respirations. Clear to auscultation.  Cardiovascular Exam: Regular rate and rhythm. S1, S2, no murmur, click, gallop, or rubs.  Extremities: 2 + pedal pulses.  No lower extremity edema.  Neurologic Exam: Nonfocal; symmetric DTRs, normal gross motor movement, tone, and coordination. No tremor.   " Psychiatric: Normal affect and mentation      Recent Labs   Lab Test 12/06/22  1129      POTASSIUM 4.3   CR 1.13   A1C 5.9        Diagnostics:  Results for orders placed or performed in visit on 03/07/23   CBC W PLT No Diff     Status: Normal   Result Value Ref Range    WBC Count 7.6 4.0 - 11.0 10e3/uL    RBC Count 5.33 4.40 - 5.90 10e6/uL    Hemoglobin 16.9 13.3 - 17.7 g/dL    Hematocrit 49.7 40.0 - 53.0 %    MCV 93 78 - 100 fL    MCH 31.7 26.5 - 33.0 pg    MCHC 34.0 31.5 - 36.5 g/dL    RDW 12.7 10.0 - 15.0 %    Platelet Count 239 150 - 450 10e3/uL   Extra Tube     Status: None    Narrative    The following orders were created for panel order Extra Tube.  Procedure                               Abnormality         Status                     ---------                               -----------         ------                     Extra Serum Separator Tu...[[id number]]                      Final result                 Please view results for these tests on the individual orders.   Extra Serum Separator Tube (SST)     Status: None   Result Value Ref Range    Hold Specimen x    EKG 12-lead, tracing only     Status: None   Result Value Ref Range    Systolic Blood Pressure  mmHg    Diastolic Blood Pressure  mmHg    Ventricular Rate 82 BPM    Atrial Rate 82 BPM    CT Interval 138 ms    QRS Duration 90 ms     ms    QTc 420 ms    P Axis 35 degrees    R AXIS -3 degrees    T Axis 90 degrees    Interpretation ECG       Sinus rhythm  Nonspecific T wave abnormality  Abnormal ECG  No previous ECGs available  Confirmed by DO CANO STACY ([id number]) on 3/9/2023 6:29:13 AM     No previous ECG available        Revised Cardiac Risk Index (RCRI):  The patient has the following serious cardiovascular risks for perioperative complications:   - No serious cardiac risks = 0 points     RCRI Interpretation: 0 points: Class I (very low risk - 0.4% complication rate)           Signed Electronically by: Zack Roa MD  Copy of this  evaluation report is provided to requesting physician.

## 2023-03-07 ENCOUNTER — OFFICE VISIT (OUTPATIENT)
Dept: FAMILY MEDICINE | Facility: OTHER | Age: 73
End: 2023-03-07
Attending: FAMILY MEDICINE
Payer: MEDICARE

## 2023-03-07 VITALS
SYSTOLIC BLOOD PRESSURE: 144 MMHG | WEIGHT: 237 LBS | RESPIRATION RATE: 18 BRPM | DIASTOLIC BLOOD PRESSURE: 92 MMHG | OXYGEN SATURATION: 95 % | BODY MASS INDEX: 38.09 KG/M2 | HEART RATE: 89 BPM | HEIGHT: 66 IN | TEMPERATURE: 98.5 F

## 2023-03-07 DIAGNOSIS — R73.03 PREDIABETES: ICD-10-CM

## 2023-03-07 DIAGNOSIS — R39.11 URINARY HESITANCY: ICD-10-CM

## 2023-03-07 DIAGNOSIS — M17.0 ARTHRITIS OF BOTH KNEES: ICD-10-CM

## 2023-03-07 DIAGNOSIS — Z01.818 PRE-OPERATIVE CLEARANCE: Primary | ICD-10-CM

## 2023-03-07 DIAGNOSIS — Z86.0101 H/O ADENOMATOUS POLYP OF COLON: ICD-10-CM

## 2023-03-07 LAB
ERYTHROCYTE [DISTWIDTH] IN BLOOD BY AUTOMATED COUNT: 12.7 % (ref 10–15)
HCT VFR BLD AUTO: 49.7 % (ref 40–53)
HGB BLD-MCNC: 16.9 G/DL (ref 13.3–17.7)
HOLD SPECIMEN: NORMAL
MCH RBC QN AUTO: 31.7 PG (ref 26.5–33)
MCHC RBC AUTO-ENTMCNC: 34 G/DL (ref 31.5–36.5)
MCV RBC AUTO: 93 FL (ref 78–100)
PLATELET # BLD AUTO: 239 10E3/UL (ref 150–450)
RBC # BLD AUTO: 5.33 10E6/UL (ref 4.4–5.9)
WBC # BLD AUTO: 7.6 10E3/UL (ref 4–11)

## 2023-03-07 PROCEDURE — 85027 COMPLETE CBC AUTOMATED: CPT | Mod: ZL | Performed by: FAMILY MEDICINE

## 2023-03-07 PROCEDURE — 93005 ELECTROCARDIOGRAM TRACING: CPT | Performed by: FAMILY MEDICINE

## 2023-03-07 PROCEDURE — 36415 COLL VENOUS BLD VENIPUNCTURE: CPT | Mod: ZL | Performed by: FAMILY MEDICINE

## 2023-03-07 PROCEDURE — 99214 OFFICE O/P EST MOD 30 MIN: CPT | Performed by: FAMILY MEDICINE

## 2023-03-07 PROCEDURE — G0463 HOSPITAL OUTPT CLINIC VISIT: HCPCS

## 2023-03-07 PROCEDURE — 93010 ELECTROCARDIOGRAM REPORT: CPT | Performed by: INTERNAL MEDICINE

## 2023-03-07 RX ORDER — TAMSULOSIN HYDROCHLORIDE 0.4 MG/1
0.4 CAPSULE ORAL DAILY
Qty: 90 CAPSULE | Refills: 0 | Status: SHIPPED | OUTPATIENT
Start: 2023-03-07 | End: 2023-06-15

## 2023-03-07 ASSESSMENT — PAIN SCALES - GENERAL: PAINLEVEL: MODERATE PAIN (5)

## 2023-03-07 NOTE — NURSING NOTE
Pt presents to clinic today for pre-op exam for TKA of left knee. Pain is only when walking in both knees.      FOOD SECURITY SCREENING QUESTIONS:    The next two questions are to help us understand your food security.  If you are feeling you need any assistance in this area, we have resources available to support you today.    Hunger Vital Signs:  Within the past 12 months we worried whether our food would run out before we got money to buy more. Never  Within the past 12 months the food we bought just didn't last and we didn't have money to get more. Never        Advance care directive on file? no  Advance care directive provided to patient? Working on at home     Medication Reconciliation: complete  Merle Foster LPN,LPN on 3/7/2023 at 1:22 PM

## 2023-03-07 NOTE — PATIENT INSTRUCTIONS
Start tamsulosin 0.4 mg daily to help urinary flow  Tylenol (acetaminophen) is safe  No ibuprofen or aspirin for a week prior to surgery

## 2023-03-09 LAB
ATRIAL RATE - MUSE: 82 BPM
DIASTOLIC BLOOD PRESSURE - MUSE: NORMAL MMHG
INTERPRETATION ECG - MUSE: NORMAL
P AXIS - MUSE: 35 DEGREES
PR INTERVAL - MUSE: 138 MS
QRS DURATION - MUSE: 90 MS
QT - MUSE: 360 MS
QTC - MUSE: 420 MS
R AXIS - MUSE: -3 DEGREES
SYSTOLIC BLOOD PRESSURE - MUSE: NORMAL MMHG
T AXIS - MUSE: 90 DEGREES
VENTRICULAR RATE- MUSE: 82 BPM

## 2023-03-10 PROBLEM — R73.03 PREDIABETES: Status: ACTIVE | Noted: 2023-03-10

## 2023-03-20 ENCOUNTER — ANESTHESIA EVENT (OUTPATIENT)
Dept: SURGERY | Facility: OTHER | Age: 73
End: 2023-03-20
Payer: MEDICARE

## 2023-03-21 ENCOUNTER — APPOINTMENT (OUTPATIENT)
Dept: OCCUPATIONAL THERAPY | Facility: OTHER | Age: 73
End: 2023-03-21
Attending: ORTHOPAEDIC SURGERY
Payer: MEDICARE

## 2023-03-21 ENCOUNTER — ANESTHESIA (OUTPATIENT)
Dept: SURGERY | Facility: OTHER | Age: 73
End: 2023-03-21
Payer: MEDICARE

## 2023-03-21 ENCOUNTER — HOSPITAL ENCOUNTER (OUTPATIENT)
Facility: OTHER | Age: 73
Discharge: HOME | End: 2023-03-22
Attending: ORTHOPAEDIC SURGERY | Admitting: ORTHOPAEDIC SURGERY
Payer: MEDICARE

## 2023-03-21 ENCOUNTER — APPOINTMENT (OUTPATIENT)
Dept: GENERAL RADIOLOGY | Facility: OTHER | Age: 73
End: 2023-03-21
Attending: ORTHOPAEDIC SURGERY
Payer: MEDICARE

## 2023-03-21 ENCOUNTER — APPOINTMENT (OUTPATIENT)
Dept: PHYSICAL THERAPY | Facility: OTHER | Age: 73
End: 2023-03-21
Attending: ORTHOPAEDIC SURGERY
Payer: MEDICARE

## 2023-03-21 DIAGNOSIS — M25.562 CHRONIC PAIN OF LEFT KNEE: Primary | ICD-10-CM

## 2023-03-21 DIAGNOSIS — G89.29 CHRONIC PAIN OF LEFT KNEE: Primary | ICD-10-CM

## 2023-03-21 PROBLEM — M17.0 PRIMARY OSTEOARTHRITIS OF BOTH KNEES: Status: ACTIVE | Noted: 2023-03-21

## 2023-03-21 PROBLEM — Z96.652 STATUS POST TOTAL LEFT KNEE REPLACEMENT: Status: ACTIVE | Noted: 2023-03-21

## 2023-03-21 LAB — LACTATE SERPL-SCNC: 1.3 MMOL/L (ref 0.7–2)

## 2023-03-21 PROCEDURE — 64450 NJX AA&/STRD OTHER PN/BRANCH: CPT | Mod: LT | Performed by: NURSE ANESTHETIST, CERTIFIED REGISTERED

## 2023-03-21 PROCEDURE — 250N000009 HC RX 250: Performed by: NURSE ANESTHETIST, CERTIFIED REGISTERED

## 2023-03-21 PROCEDURE — 97161 PT EVAL LOW COMPLEX 20 MIN: CPT | Mod: GP

## 2023-03-21 PROCEDURE — 272N000001 HC OR GENERAL SUPPLY STERILE: Performed by: ORTHOPAEDIC SURGERY

## 2023-03-21 PROCEDURE — 36415 COLL VENOUS BLD VENIPUNCTURE: CPT | Performed by: ORTHOPAEDIC SURGERY

## 2023-03-21 PROCEDURE — 97165 OT EVAL LOW COMPLEX 30 MIN: CPT | Mod: GO | Performed by: OCCUPATIONAL THERAPIST

## 2023-03-21 PROCEDURE — 999N000141 HC STATISTIC PRE-PROCEDURE NURSING ASSESSMENT: Performed by: ORTHOPAEDIC SURGERY

## 2023-03-21 PROCEDURE — C1776 JOINT DEVICE (IMPLANTABLE): HCPCS | Performed by: ORTHOPAEDIC SURGERY

## 2023-03-21 PROCEDURE — 250N000011 HC RX IP 250 OP 636: Performed by: NURSE ANESTHETIST, CERTIFIED REGISTERED

## 2023-03-21 PROCEDURE — 27447 TOTAL KNEE ARTHROPLASTY: CPT | Performed by: NURSE ANESTHETIST, CERTIFIED REGISTERED

## 2023-03-21 PROCEDURE — 96375 TX/PRO/DX INJ NEW DRUG ADDON: CPT

## 2023-03-21 PROCEDURE — 258N000003 HC RX IP 258 OP 636: Performed by: NURSE ANESTHETIST, CERTIFIED REGISTERED

## 2023-03-21 PROCEDURE — 99214 OFFICE O/P EST MOD 30 MIN: CPT | Performed by: FAMILY MEDICINE

## 2023-03-21 PROCEDURE — 258N000001 HC RX 258: Performed by: ORTHOPAEDIC SURGERY

## 2023-03-21 PROCEDURE — 250N000013 HC RX MED GY IP 250 OP 250 PS 637: Performed by: ORTHOPAEDIC SURGERY

## 2023-03-21 PROCEDURE — 710N000010 HC RECOVERY PHASE 1, LEVEL 2, PER MIN: Performed by: ORTHOPAEDIC SURGERY

## 2023-03-21 PROCEDURE — 250N000011 HC RX IP 250 OP 636: Performed by: ORTHOPAEDIC SURGERY

## 2023-03-21 PROCEDURE — 370N000017 HC ANESTHESIA TECHNICAL FEE, PER MIN: Performed by: ORTHOPAEDIC SURGERY

## 2023-03-21 PROCEDURE — 360N000077 HC SURGERY LEVEL 4, PER MIN: Performed by: ORTHOPAEDIC SURGERY

## 2023-03-21 PROCEDURE — 64447 NJX AA&/STRD FEMORAL NRV IMG: CPT | Mod: LT | Performed by: NURSE ANESTHETIST, CERTIFIED REGISTERED

## 2023-03-21 PROCEDURE — 97530 THERAPEUTIC ACTIVITIES: CPT | Mod: GO | Performed by: OCCUPATIONAL THERAPIST

## 2023-03-21 PROCEDURE — 999N000104 HC STATISTIC NO CHARGE

## 2023-03-21 PROCEDURE — 999N000065 XR KNEE PORT LEFT 1/2 VIEWS: Mod: LT

## 2023-03-21 PROCEDURE — 83605 ASSAY OF LACTIC ACID: CPT | Performed by: ORTHOPAEDIC SURGERY

## 2023-03-21 PROCEDURE — 258N000003 HC RX IP 258 OP 636: Performed by: ORTHOPAEDIC SURGERY

## 2023-03-21 PROCEDURE — C1713 ANCHOR/SCREW BN/BN,TIS/BN: HCPCS | Performed by: ORTHOPAEDIC SURGERY

## 2023-03-21 PROCEDURE — 96374 THER/PROPH/DIAG INJ IV PUSH: CPT

## 2023-03-21 PROCEDURE — 999N000157 HC STATISTIC RCP TIME EA 10 MIN

## 2023-03-21 PROCEDURE — 27447 TOTAL KNEE ARTHROPLASTY: CPT | Mod: LT | Performed by: ORTHOPAEDIC SURGERY

## 2023-03-21 DEVICE — IMPLANTABLE DEVICE
Type: IMPLANTABLE DEVICE | Site: KNEE | Status: FUNCTIONAL
Brand: PERSONA® VIVACIT-E®

## 2023-03-21 DEVICE — BONE CEMENT SIMPLEX W/TOBRAMYCIN 6197-9-001: Type: IMPLANTABLE DEVICE | Site: KNEE | Status: FUNCTIONAL

## 2023-03-21 DEVICE — IMPLANTABLE DEVICE
Type: IMPLANTABLE DEVICE | Site: KNEE | Status: FUNCTIONAL
Brand: PERSONA® NATURAL TIBIA®

## 2023-03-21 DEVICE — SCREW TEMP PSN ZIM FEMALE 2.5MM 42-5099-025-25: Type: IMPLANTABLE DEVICE | Site: KNEE | Status: FUNCTIONAL

## 2023-03-21 RX ORDER — NALOXONE HYDROCHLORIDE 0.4 MG/ML
0.4 INJECTION, SOLUTION INTRAMUSCULAR; INTRAVENOUS; SUBCUTANEOUS
Status: DISCONTINUED | OUTPATIENT
Start: 2023-03-21 | End: 2023-03-22 | Stop reason: HOSPADM

## 2023-03-21 RX ORDER — DEXAMETHASONE SODIUM PHOSPHATE 10 MG/ML
INJECTION, SOLUTION INTRAMUSCULAR; INTRAVENOUS
Status: COMPLETED | OUTPATIENT
Start: 2023-03-21 | End: 2023-03-21

## 2023-03-21 RX ORDER — LABETALOL HYDROCHLORIDE 5 MG/ML
10 INJECTION, SOLUTION INTRAVENOUS
Status: DISCONTINUED | OUTPATIENT
Start: 2023-03-21 | End: 2023-03-21 | Stop reason: HOSPADM

## 2023-03-21 RX ORDER — FENTANYL CITRATE 50 UG/ML
50 INJECTION, SOLUTION INTRAMUSCULAR; INTRAVENOUS EVERY 5 MIN PRN
Status: DISCONTINUED | OUTPATIENT
Start: 2023-03-21 | End: 2023-03-21 | Stop reason: HOSPADM

## 2023-03-21 RX ORDER — IBUPROFEN 600 MG/1
600 TABLET, FILM COATED ORAL EVERY 6 HOURS PRN
Qty: 30 TABLET | Refills: 0 | Status: SHIPPED | OUTPATIENT
Start: 2023-03-21 | End: 2023-08-22

## 2023-03-21 RX ORDER — OXYCODONE HYDROCHLORIDE 5 MG/1
10 TABLET ORAL EVERY 4 HOURS PRN
Status: DISCONTINUED | OUTPATIENT
Start: 2023-03-21 | End: 2023-03-22 | Stop reason: HOSPADM

## 2023-03-21 RX ORDER — NALOXONE HYDROCHLORIDE 0.4 MG/ML
0.2 INJECTION, SOLUTION INTRAMUSCULAR; INTRAVENOUS; SUBCUTANEOUS
Status: DISCONTINUED | OUTPATIENT
Start: 2023-03-21 | End: 2023-03-22 | Stop reason: HOSPADM

## 2023-03-21 RX ORDER — AMOXICILLIN 250 MG
1 CAPSULE ORAL 2 TIMES DAILY
Status: DISCONTINUED | OUTPATIENT
Start: 2023-03-21 | End: 2023-03-22 | Stop reason: HOSPADM

## 2023-03-21 RX ORDER — SODIUM CHLORIDE, SODIUM LACTATE, POTASSIUM CHLORIDE, CALCIUM CHLORIDE 600; 310; 30; 20 MG/100ML; MG/100ML; MG/100ML; MG/100ML
INJECTION, SOLUTION INTRAVENOUS CONTINUOUS
Status: DISCONTINUED | OUTPATIENT
Start: 2023-03-21 | End: 2023-03-21 | Stop reason: HOSPADM

## 2023-03-21 RX ORDER — LIDOCAINE HYDROCHLORIDE 20 MG/ML
INJECTION, SOLUTION INFILTRATION; PERINEURAL PRN
Status: DISCONTINUED | OUTPATIENT
Start: 2023-03-21 | End: 2023-03-21

## 2023-03-21 RX ORDER — CEFAZOLIN SODIUM 2 G/100ML
2 INJECTION, SOLUTION INTRAVENOUS EVERY 8 HOURS
Status: COMPLETED | OUTPATIENT
Start: 2023-03-21 | End: 2023-03-22

## 2023-03-21 RX ORDER — ASPIRIN 81 MG/1
81 TABLET ORAL 2 TIMES DAILY
Qty: 60 TABLET | Refills: 0 | Status: SHIPPED | OUTPATIENT
Start: 2023-03-21 | End: 2023-08-22

## 2023-03-21 RX ORDER — PROPOFOL 10 MG/ML
INJECTION, EMULSION INTRAVENOUS CONTINUOUS PRN
Status: DISCONTINUED | OUTPATIENT
Start: 2023-03-21 | End: 2023-03-21

## 2023-03-21 RX ORDER — KETAMINE HYDROCHLORIDE 10 MG/ML
INJECTION INTRAMUSCULAR; INTRAVENOUS PRN
Status: DISCONTINUED | OUTPATIENT
Start: 2023-03-21 | End: 2023-03-21

## 2023-03-21 RX ORDER — TRANEXAMIC ACID 650 MG/1
1950 TABLET ORAL ONCE
Status: COMPLETED | OUTPATIENT
Start: 2023-03-21 | End: 2023-03-21

## 2023-03-21 RX ORDER — ONDANSETRON 4 MG/1
4 TABLET, ORALLY DISINTEGRATING ORAL EVERY 30 MIN PRN
Status: DISCONTINUED | OUTPATIENT
Start: 2023-03-21 | End: 2023-03-21 | Stop reason: HOSPADM

## 2023-03-21 RX ORDER — HYDROMORPHONE HCL IN WATER/PF 6 MG/30 ML
0.4 PATIENT CONTROLLED ANALGESIA SYRINGE INTRAVENOUS
Status: DISCONTINUED | OUTPATIENT
Start: 2023-03-21 | End: 2023-03-22 | Stop reason: HOSPADM

## 2023-03-21 RX ORDER — ACETAMINOPHEN 325 MG/1
975 TABLET ORAL EVERY 8 HOURS
Status: DISCONTINUED | OUTPATIENT
Start: 2023-03-21 | End: 2023-03-22 | Stop reason: HOSPADM

## 2023-03-21 RX ORDER — ONDANSETRON 4 MG/1
4 TABLET, ORALLY DISINTEGRATING ORAL EVERY 6 HOURS PRN
Status: DISCONTINUED | OUTPATIENT
Start: 2023-03-21 | End: 2023-03-22 | Stop reason: HOSPADM

## 2023-03-21 RX ORDER — BUPIVACAINE HYDROCHLORIDE 5 MG/ML
INJECTION, SOLUTION EPIDURAL; INTRACAUDAL
Status: COMPLETED | OUTPATIENT
Start: 2023-03-21 | End: 2023-03-21

## 2023-03-21 RX ORDER — KETOROLAC TROMETHAMINE 15 MG/ML
15 INJECTION, SOLUTION INTRAMUSCULAR; INTRAVENOUS EVERY 6 HOURS
Status: DISCONTINUED | OUTPATIENT
Start: 2023-03-21 | End: 2023-03-22 | Stop reason: HOSPADM

## 2023-03-21 RX ORDER — POLYETHYLENE GLYCOL 3350 17 G/17G
17 POWDER, FOR SOLUTION ORAL DAILY
Status: DISCONTINUED | OUTPATIENT
Start: 2023-03-22 | End: 2023-03-22 | Stop reason: HOSPADM

## 2023-03-21 RX ORDER — LIDOCAINE 40 MG/G
CREAM TOPICAL
Status: DISCONTINUED | OUTPATIENT
Start: 2023-03-21 | End: 2023-03-21 | Stop reason: HOSPADM

## 2023-03-21 RX ORDER — TAMSULOSIN HYDROCHLORIDE 0.4 MG/1
0.4 CAPSULE ORAL AT BEDTIME
Status: DISCONTINUED | OUTPATIENT
Start: 2023-03-21 | End: 2023-03-22 | Stop reason: HOSPADM

## 2023-03-21 RX ORDER — MEPERIDINE HYDROCHLORIDE 50 MG/ML
12.5 INJECTION INTRAMUSCULAR; INTRAVENOUS; SUBCUTANEOUS EVERY 5 MIN PRN
Status: DISCONTINUED | OUTPATIENT
Start: 2023-03-21 | End: 2023-03-21 | Stop reason: HOSPADM

## 2023-03-21 RX ORDER — ONDANSETRON 2 MG/ML
INJECTION INTRAMUSCULAR; INTRAVENOUS PRN
Status: DISCONTINUED | OUTPATIENT
Start: 2023-03-21 | End: 2023-03-21

## 2023-03-21 RX ORDER — KETOROLAC TROMETHAMINE 30 MG/ML
INJECTION, SOLUTION INTRAMUSCULAR; INTRAVENOUS PRN
Status: DISCONTINUED | OUTPATIENT
Start: 2023-03-21 | End: 2023-03-21

## 2023-03-21 RX ORDER — NALOXONE HYDROCHLORIDE 0.4 MG/ML
0.4 INJECTION, SOLUTION INTRAMUSCULAR; INTRAVENOUS; SUBCUTANEOUS
Status: DISCONTINUED | OUTPATIENT
Start: 2023-03-21 | End: 2023-03-21 | Stop reason: HOSPADM

## 2023-03-21 RX ORDER — KETOROLAC TROMETHAMINE 15 MG/ML
15 INJECTION, SOLUTION INTRAMUSCULAR; INTRAVENOUS
Status: DISCONTINUED | OUTPATIENT
Start: 2023-03-21 | End: 2023-03-21 | Stop reason: HOSPADM

## 2023-03-21 RX ORDER — CEFAZOLIN SODIUM/WATER 2 G/20 ML
2 SYRINGE (ML) INTRAVENOUS SEE ADMIN INSTRUCTIONS
Status: DISCONTINUED | OUTPATIENT
Start: 2023-03-21 | End: 2023-03-21 | Stop reason: HOSPADM

## 2023-03-21 RX ORDER — ONDANSETRON 2 MG/ML
4 INJECTION INTRAMUSCULAR; INTRAVENOUS EVERY 30 MIN PRN
Status: DISCONTINUED | OUTPATIENT
Start: 2023-03-21 | End: 2023-03-21 | Stop reason: HOSPADM

## 2023-03-21 RX ORDER — NALOXONE HYDROCHLORIDE 0.4 MG/ML
0.2 INJECTION, SOLUTION INTRAMUSCULAR; INTRAVENOUS; SUBCUTANEOUS
Status: DISCONTINUED | OUTPATIENT
Start: 2023-03-21 | End: 2023-03-21 | Stop reason: HOSPADM

## 2023-03-21 RX ORDER — HYDROXYZINE HYDROCHLORIDE 10 MG/1
10 TABLET, FILM COATED ORAL EVERY 6 HOURS PRN
Status: DISCONTINUED | OUTPATIENT
Start: 2023-03-21 | End: 2023-03-22 | Stop reason: HOSPADM

## 2023-03-21 RX ORDER — GLYCINE 1.5 G/100ML
SOLUTION IRRIGATION PRN
Status: DISCONTINUED | OUTPATIENT
Start: 2023-03-21 | End: 2023-03-21 | Stop reason: HOSPADM

## 2023-03-21 RX ORDER — ACETAMINOPHEN 325 MG/1
650 TABLET ORAL EVERY 4 HOURS PRN
Status: DISCONTINUED | OUTPATIENT
Start: 2023-03-24 | End: 2023-03-22 | Stop reason: HOSPADM

## 2023-03-21 RX ORDER — BISACODYL 10 MG
10 SUPPOSITORY, RECTAL RECTAL DAILY PRN
Status: DISCONTINUED | OUTPATIENT
Start: 2023-03-21 | End: 2023-03-22 | Stop reason: HOSPADM

## 2023-03-21 RX ORDER — FENTANYL CITRATE 50 UG/ML
25-50 INJECTION, SOLUTION INTRAMUSCULAR; INTRAVENOUS
Status: DISCONTINUED | OUTPATIENT
Start: 2023-03-21 | End: 2023-03-21 | Stop reason: HOSPADM

## 2023-03-21 RX ORDER — ONDANSETRON 2 MG/ML
4 INJECTION INTRAMUSCULAR; INTRAVENOUS EVERY 6 HOURS PRN
Status: DISCONTINUED | OUTPATIENT
Start: 2023-03-21 | End: 2023-03-22 | Stop reason: HOSPADM

## 2023-03-21 RX ORDER — FLUMAZENIL 0.1 MG/ML
0.2 INJECTION, SOLUTION INTRAVENOUS
Status: DISCONTINUED | OUTPATIENT
Start: 2023-03-21 | End: 2023-03-21 | Stop reason: HOSPADM

## 2023-03-21 RX ORDER — OXYCODONE HYDROCHLORIDE 5 MG/1
5 TABLET ORAL EVERY 4 HOURS PRN
Status: DISCONTINUED | OUTPATIENT
Start: 2023-03-21 | End: 2023-03-22 | Stop reason: HOSPADM

## 2023-03-21 RX ORDER — HYDROMORPHONE HCL IN WATER/PF 6 MG/30 ML
0.2 PATIENT CONTROLLED ANALGESIA SYRINGE INTRAVENOUS
Status: DISCONTINUED | OUTPATIENT
Start: 2023-03-21 | End: 2023-03-22 | Stop reason: HOSPADM

## 2023-03-21 RX ORDER — DEXAMETHASONE SODIUM PHOSPHATE 4 MG/ML
INJECTION, SOLUTION INTRA-ARTICULAR; INTRALESIONAL; INTRAMUSCULAR; INTRAVENOUS; SOFT TISSUE PRN
Status: DISCONTINUED | OUTPATIENT
Start: 2023-03-21 | End: 2023-03-21

## 2023-03-21 RX ORDER — LIDOCAINE 40 MG/G
CREAM TOPICAL
Status: DISCONTINUED | OUTPATIENT
Start: 2023-03-21 | End: 2023-03-22 | Stop reason: HOSPADM

## 2023-03-21 RX ORDER — HYDROMORPHONE HYDROCHLORIDE 1 MG/ML
0.2 INJECTION, SOLUTION INTRAMUSCULAR; INTRAVENOUS; SUBCUTANEOUS EVERY 5 MIN PRN
Status: DISCONTINUED | OUTPATIENT
Start: 2023-03-21 | End: 2023-03-21 | Stop reason: HOSPADM

## 2023-03-21 RX ORDER — ASPIRIN 81 MG/1
81 TABLET ORAL 2 TIMES DAILY
Status: DISCONTINUED | OUTPATIENT
Start: 2023-03-22 | End: 2023-03-22 | Stop reason: HOSPADM

## 2023-03-21 RX ORDER — CELECOXIB 100 MG/1
400 CAPSULE ORAL ONCE
Status: COMPLETED | OUTPATIENT
Start: 2023-03-21 | End: 2023-03-21

## 2023-03-21 RX ORDER — FENTANYL CITRATE 50 UG/ML
25 INJECTION, SOLUTION INTRAMUSCULAR; INTRAVENOUS EVERY 5 MIN PRN
Status: DISCONTINUED | OUTPATIENT
Start: 2023-03-21 | End: 2023-03-21 | Stop reason: HOSPADM

## 2023-03-21 RX ORDER — CEFAZOLIN SODIUM/WATER 2 G/20 ML
2 SYRINGE (ML) INTRAVENOUS
Status: COMPLETED | OUTPATIENT
Start: 2023-03-21 | End: 2023-03-21

## 2023-03-21 RX ORDER — PROCHLORPERAZINE MALEATE 5 MG
5 TABLET ORAL EVERY 6 HOURS PRN
Status: DISCONTINUED | OUTPATIENT
Start: 2023-03-21 | End: 2023-03-22 | Stop reason: HOSPADM

## 2023-03-21 RX ORDER — AMOXICILLIN 250 MG
1-2 CAPSULE ORAL 2 TIMES DAILY
Qty: 30 TABLET | Refills: 0 | Status: SHIPPED | OUTPATIENT
Start: 2023-03-21 | End: 2023-08-22

## 2023-03-21 RX ORDER — HYDROMORPHONE HYDROCHLORIDE 1 MG/ML
0.4 INJECTION, SOLUTION INTRAMUSCULAR; INTRAVENOUS; SUBCUTANEOUS EVERY 5 MIN PRN
Status: DISCONTINUED | OUTPATIENT
Start: 2023-03-21 | End: 2023-03-21 | Stop reason: HOSPADM

## 2023-03-21 RX ORDER — SODIUM CHLORIDE 9 MG/ML
75 INJECTION, SOLUTION INTRAVENOUS CONTINUOUS
Status: DISCONTINUED | OUTPATIENT
Start: 2023-03-21 | End: 2023-03-22 | Stop reason: HOSPADM

## 2023-03-21 RX ORDER — OXYCODONE HYDROCHLORIDE 5 MG/1
5 TABLET ORAL EVERY 4 HOURS PRN
Qty: 20 TABLET | Refills: 0 | Status: SHIPPED | OUTPATIENT
Start: 2023-03-21 | End: 2023-08-22

## 2023-03-21 RX ADMIN — KETOROLAC TROMETHAMINE 15 MG: 30 INJECTION, SOLUTION INTRAMUSCULAR at 10:06

## 2023-03-21 RX ADMIN — MIDAZOLAM 2 MG: 1 INJECTION INTRAMUSCULAR; INTRAVENOUS at 08:58

## 2023-03-21 RX ADMIN — LIDOCAINE HYDROCHLORIDE 40 MG: 20 INJECTION, SOLUTION INFILTRATION; PERINEURAL at 10:02

## 2023-03-21 RX ADMIN — BUPIVACAINE HYDROCHLORIDE 15 ML: 5 INJECTION, SOLUTION EPIDURAL; INTRACAUDAL; PERINEURAL at 09:01

## 2023-03-21 RX ADMIN — CEFAZOLIN SODIUM 2 G: 2 INJECTION, SOLUTION INTRAVENOUS at 18:13

## 2023-03-21 RX ADMIN — SODIUM CHLORIDE 75 ML/HR: 9 INJECTION, SOLUTION INTRAVENOUS at 13:43

## 2023-03-21 RX ADMIN — Medication 2 G: at 09:44

## 2023-03-21 RX ADMIN — MEPIVACAINE HYDROCHLORIDE 2.5 ML: 20 INJECTION, SOLUTION INFILTRATION at 10:00

## 2023-03-21 RX ADMIN — ACETAMINOPHEN 975 MG: 325 TABLET ORAL at 20:19

## 2023-03-21 RX ADMIN — KETOROLAC TROMETHAMINE 15 MG: 15 INJECTION, SOLUTION INTRAMUSCULAR; INTRAVENOUS at 20:19

## 2023-03-21 RX ADMIN — DEXAMETHASONE SODIUM PHOSPHATE 4 MG: 4 INJECTION, SOLUTION INTRA-ARTICULAR; INTRALESIONAL; INTRAMUSCULAR; INTRAVENOUS; SOFT TISSUE at 10:06

## 2023-03-21 RX ADMIN — ONDANSETRON HYDROCHLORIDE 4 MG: 2 SOLUTION INTRAMUSCULAR; INTRAVENOUS at 10:06

## 2023-03-21 RX ADMIN — PROPOFOL 50 MCG/KG/MIN: 10 INJECTION, EMULSION INTRAVENOUS at 10:02

## 2023-03-21 RX ADMIN — DEXAMETHASONE SODIUM PHOSPHATE 5 MG: 10 INJECTION, SOLUTION INTRAMUSCULAR; INTRAVENOUS at 09:01

## 2023-03-21 RX ADMIN — CELECOXIB 400 MG: 100 CAPSULE ORAL at 08:28

## 2023-03-21 RX ADMIN — BUPIVACAINE HYDROCHLORIDE 15 ML: 5 INJECTION, SOLUTION EPIDURAL; INTRACAUDAL at 09:05

## 2023-03-21 RX ADMIN — SODIUM CHLORIDE, SODIUM LACTATE, POTASSIUM CHLORIDE, AND CALCIUM CHLORIDE: 600; 310; 30; 20 INJECTION, SOLUTION INTRAVENOUS at 10:56

## 2023-03-21 RX ADMIN — TRANEXAMIC ACID 1950 MG: 650 TABLET ORAL at 08:29

## 2023-03-21 RX ADMIN — SODIUM CHLORIDE, SODIUM LACTATE, POTASSIUM CHLORIDE, AND CALCIUM CHLORIDE: 600; 310; 30; 20 INJECTION, SOLUTION INTRAVENOUS at 08:31

## 2023-03-21 RX ADMIN — MIDAZOLAM HYDROCHLORIDE 2 MG: 1 INJECTION, SOLUTION INTRAMUSCULAR; INTRAVENOUS at 09:55

## 2023-03-21 RX ADMIN — DEXAMETHASONE SODIUM PHOSPHATE 5 MG: 10 INJECTION, SOLUTION INTRAMUSCULAR; INTRAVENOUS at 09:05

## 2023-03-21 RX ADMIN — ACETAMINOPHEN 975 MG: 325 TABLET ORAL at 13:36

## 2023-03-21 RX ADMIN — SENNOSIDES AND DOCUSATE SODIUM 1 TABLET: 50; 8.6 TABLET ORAL at 22:33

## 2023-03-21 RX ADMIN — TAMSULOSIN HYDROCHLORIDE 0.4 MG: 0.4 CAPSULE ORAL at 22:33

## 2023-03-21 RX ADMIN — Medication 30 MG: at 09:55

## 2023-03-21 ASSESSMENT — ACTIVITIES OF DAILY LIVING (ADL)
ADLS_ACUITY_SCORE: 35
ADLS_ACUITY_SCORE: 35
ADLS_ACUITY_SCORE: 23
ADLS_ACUITY_SCORE: 20
ADLS_ACUITY_SCORE: 23
ADLS_ACUITY_SCORE: 23
ADLS_ACUITY_SCORE: 35
ADLS_ACUITY_SCORE: 23

## 2023-03-21 NOTE — PHARMACY-ADMISSION MEDICATION HISTORY
Pharmacy -- Admission Medication Reconciliation    Prior to admission (PTA) medications were reviewed and the patient's PTA medication list was updated.    Sources Consulted: patient, sure scripts, chart review    The reliability of this Medication Reconciliation is: Reliability: Reliable    The following significant changes were made:  No changes were made to the patient's home medication list    Patient confirms he does not take any OTCs or supplements and is only taking tamsulosin for prescription med    In addition, the patient's allergies were reviewed with the patient and updated as follows:   Allergies: Patient has no known allergies.    The pharmacist has reviewed with the patient that all personal medications should be removed from the building or locked in the belongings safe.  Patient shall only take medications ordered by the physician and administered by the nursing staff.       Medication barriers identified: none   Medication adherence concerns: none   Understanding of emergency medications: n/a   Medication Affordability:  No issues    Essie Rossi [facility], 3/21/2023,  12:57 PM

## 2023-03-21 NOTE — INTERVAL H&P NOTE
Brief History of Illness:   Papo Gonzalez is a 72 year old male who was admitted for left knee pain    H&P reviewed and patient examined with no new updates from prior exam

## 2023-03-21 NOTE — OR NURSING
PACU Transfer Note    Papo Gonzalez was transferred to [facility] via bed.  Equipment used for transport:  none.  Accompanied by:  RN  Prescriptions were: NA    PACU Respiratory Event Documentation     1) Episodes of Apnea greater than or equal to 10 seconds: no    2) Bradypnea - less than 8 breaths per minute: no    3) Pain score on 0 to 10 scale: 0    4) Pain-sedation mismatch (yes or no): no    5) Repeated 02 desaturation less than 90% (yes or no):  o    Anesthesia notified? (yes or no): no    Report to Meghna    Any of the above events occuring repeatedly in separate 30 minute intervals may be considered recurrent PACU respiratory events.    Patient stable and meets phase 1 discharge criteria for transport from PACU.

## 2023-03-21 NOTE — ANESTHESIA PROCEDURE NOTES
Adductor canal Procedure Note    Pre-Procedure   Staff -        CRNA: Terese Lozada APRN CRNA       Performed By: CRNA       Location: pre-op       Procedure Start/Stop Times: 3/21/2023 9:02 AM and 3/21/2023 9:05 AM       Pre-Anesthestic Checklist: patient identified, IV checked, site marked, risks and benefits discussed, informed consent, monitors and equipment checked, pre-op evaluation, at physician/surgeon's request and post-op pain management  Timeout:       Correct Patient: Yes        Correct Procedure: Yes        Correct Site: Yes        Correct Position: Yes        Correct Laterality: Yes        Site Marked: Yes  Procedure Documentation  Procedure: Adductor canal       Diagnosis: POST OPERATIVE PAIN CONTROL       Laterality: left       Patient Position: supine       Patient Prep/Sterile Barriers: sterile gloves, mask       Skin prep: Chloraprep       Local skin infiltrated with 0.5 mL of. Local skin infiltration: 0.5% bupivacaine.       Needle Type: insulated and short bevel       Needle Gauge: 20.        Needle Length (Inches): 6        Ultrasound guided       1. Ultrasound was used to identify targeted nerve, plexus, vascular marker, or fascial plane and place a needle adjacent to it in real-time.       2. Ultrasound was used to visualize the spread of anesthetic in close proximity to the above referenced structure.       3. A permanent image is entered into the patient's record.       4. The visualized anatomic structures appeared normal.       5. There were no apparent abnormal pathologic findings.    Assessment/Narrative         The placement was negative for: blood aspirated, painful injection and site bleeding       Paresthesias: No.       Bolus given via needle..        Secured via.        Insertion/Infusion Method: Single Shot       Complications: none    Medication(s) Administered   Bupivacaine 0.5% PF (Infiltration) - Infiltration   15 mL - 3/21/2023 9:05:00 AM  Dexamethasone 10 mg/mL PF  "(Perineural) - Perineural   5 mg - 3/21/2023 9:05:00 AM  Medication Administration Time: 3/21/2023 9:02 AM      FOR [facility] ([facility]) ONLY:   Pain Team Contact information: please page the Pain Team Via [employer]. Search \"Pain\". During daytime hours, please page the attending first. At night please page the resident first.    "

## 2023-03-21 NOTE — ANESTHESIA PREPROCEDURE EVALUATION
Anesthesia Pre-Procedure Evaluation    Patient: Papo Gonzalez   MRN: [id number] : 1950        Procedure : Procedure(s):  ARTHROPLASTY, KNEE, TOTAL          History reviewed. No pertinent past medical history.   Past Surgical History:   Procedure Laterality Date     COLONOSCOPY  2016    tubular adenomas, repeat       No Known Allergies   Social History     Tobacco Use     Smoking status: Never     Passive exposure: Never     Smokeless tobacco: Never   Substance Use Topics     Alcohol use: Yes     Comment: 1 beer per week      Wt Readings from Last 1 Encounters:   23 103 kg (227 lb)        Anesthesia Evaluation   Pt has had prior anesthetic.     No history of anesthetic complications       ROS/MED HX  ENT/Pulmonary:  - neg pulmonary ROS     Neurologic:  - neg neurologic ROS     Cardiovascular:  - neg cardiovascular ROS     METS/Exercise Tolerance: >4 METS    Hematologic:  - neg hematologic  ROS     Musculoskeletal:   (+) arthritis,     GI/Hepatic:  - neg GI/hepatic ROS     Renal/Genitourinary:  - neg Renal ROS     Endo:  - neg endo ROS     Psychiatric/Substance Use:  - neg psychiatric ROS     Infectious Disease:  - neg infectious disease ROS     Malignancy:  - neg malignancy ROS     Other:  - neg other ROS          Physical Exam    Airway  airway exam normal      Mallampati: II   TM distance: > 3 FB   Neck ROM: full   Mouth opening: > 3 cm    Respiratory Devices and Support         Dental       (+) Minor Abnormalities - some fillings, tiny chips      Cardiovascular   cardiovascular exam normal       Rhythm and rate: regular and normal     Pulmonary   pulmonary exam normal        breath sounds clear to auscultation           OUTSIDE LABS:  CBC:   Lab Results   Component Value Date    WBC 7.6 2023    HGB 16.9 2023    HCT 49.7 2023     2023     BMP:   Lab Results   Component Value Date     2022    POTASSIUM 4.3 2022    CHLORIDE 102 2022     CO2 26 12/06/2022    BUN 13.5 12/06/2022    CR 1.13 12/06/2022     (H) 12/06/2022     COAGS: No results found for: PTT, INR, FIBR  POC: No results found for: BGM, HCG, HCGS  HEPATIC:   Lab Results   Component Value Date    ALBUMIN 4.4 12/06/2022    PROTTOTAL 7.7 12/06/2022    ALT 36 12/06/2022    AST 26 12/06/2022    ALKPHOS 74 12/06/2022    BILITOTAL 0.5 12/06/2022     OTHER:   Lab Results   Component Value Date    A1C 5.9 12/06/2022    LUIGI 9.1 12/06/2022       Anesthesia Plan    ASA Status:  2   NPO Status:  NPO Appropriate    Anesthesia Type: Spinal.              Consents    Anesthesia Plan(s) and associated risks, benefits, and realistic alternatives discussed. Questions answered and patient/representative(s) expressed understanding.     - Discussed: Risks, Benefits and Alternatives for BOTH SEDATION and the PROCEDURE were discussed     - Discussed with:  Patient      - Extended Intubation/Ventilatory Support Discussed: No.      - Patient is DNR/DNI Status: No    Use of blood products discussed: Yes.     - Discussed with: Patient.     - Consented: consented to blood products            Reason for refusal: other.     Postoperative Care    Pain management: IV analgesics, Peripheral nerve block (Single Shot), Multi-modal analgesia.   PONV prophylaxis: Ondansetron (or other 5HT-3)     Comments:                LUIZ MELVIN CRNA

## 2023-03-21 NOTE — PLAN OF CARE
"Patient has worked with PT and up to chair.  Declines any interventions for pain besides scheduled tylenol/motrin and ice.  Tolerating a regular diet with no nausea.  Has voided without difficulty since surgery.  Alert, oriented.  VSS, afebrile.  Dressing CDI.  CMS intact.    PRIMARY DIAGNOSIS: \"GENERIC\" NURSING  OUTPATIENT/OBSERVATION GOALS TO BE MET BEFORE DISCHARGE:  ADLs back to baseline: No    Activity and level of assistance: assist x 1 and walker    Pain status: Improved-controlled with oral pain medications.    Return to near baseline physical activity: No     Discharge Planner Nurse   Safe discharge environment identified: Yes  Barriers to discharge: Yes         Entered by: Meghna Judge RN 03/21/2023 5:32 PM     Please review provider order for any additional goals.   Nurse to notify provider when observation goals have been met and patient is ready for discharge.Goal Outcome Evaluation:      Plan of Care Reviewed With: patient    Overall Patient Progress: improvingOverall Patient Progress: improving           "

## 2023-03-21 NOTE — PROGRESS NOTES
03/21/23 1500   Appointment Info   Signing Clinician's Name / Credentials (OT) Trina Meza, OTS; Neema Childress, OTR/L   Living Environment   People in Home significant other   Current Living Arrangements house   Home Accessibility stairs within home  (Stairs in home, but do not have to go up/down them.)   Number of Stairs, Within Home, Primary greater than 10 stairs   Stair Railings, Within Home, Primary railings safe and in good condition   Transportation Anticipated car, drives self;family or friend will provide   Self-Care   Usual Activity Tolerance good   Current Activity Tolerance moderate   Equipment Currently Used at Home none   Fall history within last six months no   General Information   Onset of Illness/Injury or Date of Surgery 03/21/23   Referring Physician Caleb Garcia MD   Cognitive Status Examination   Orientation Status orientation to person, place and time   Affect/Mental Status (Cognitive) WNL   Follows Commands WNL   Pain Assessment   Patient Currently in Pain Yes, see Vital Sign flowsheet  (Pt reported having pain at 4/10)   Clinical Impression   Criteria for Skilled Therapeutic Interventions Met (OT) Yes, treatment indicated   OT Diagnosis Decreased mobility and self-cares s/p TKA   OT Problem List-Impairments impacting ADL activity tolerance impaired;strength;mobility   Assessment of Occupational Performance 1-3 Performance Deficits   Planned Therapy Interventions (OT) ADL retraining;strengthening;progressive activity/exercise   Clinical Decision Making Complexity (OT) low complexity   Anticipated Equipment Needs Upon Discharge (OT)   (Pt has walker and shower chair at home.)   Risk & Benefits of therapy have been explained risks/benefits reviewed   OT Total Evaluation Time   OT Eval, Low Complexity Minutes (38676) 15   OT Goals   Therapy Frequency (OT) One time eval and treatment   OT Predicted Duration/Target Date for Goal Attainment 03/22/23   OT Goals Lower Body Dressing;Lower Body  Bathing;Transfers;Toilet Transfer/Toileting   OT: Lower Body Dressing Modified independent   OT: Lower Body Bathing Modified independent   OT: Transfer Independent   OT: Toilet Transfer/Toileting Independent   Interventions   Interventions Quick Adds Therapeutic Activity   Therapeutic Activities   Therapeutic Activity Minutes (93435) 15   Symptoms noted during/after treatment none  (Reported having a headache, but states that was there prior to OT.)   Treatment Detail/Skilled Intervention Moved from supine to sit at EOB with SBA to help guide legs. Pt ambulated from EOB to recliner with CGA for safety and FWW.   OT Discharge Planning   OT Plan Continue OT while hospitalized.   OT Discharge Recommendation (DC Rec) home with assist   OT Rationale for DC Rec Pt will receive assistance from spouse as needed. Will attend outpatient physical therapy. Will continue to assess further for OT needs at time of discharge.   OT Brief overview of current status Pt was very pleasant during evaluation. Spouse was present. Pt motivated to get up and ambulated well from EOB to recliner with CGA for safety and with FWW. Reported pain at a 4/10.   Total Session Time   Timed Code Treatment Minutes 15   Total Session Time (sum of timed and untimed services) 30

## 2023-03-21 NOTE — ANESTHESIA PROCEDURE NOTES
Other (IPACK) Procedure Note    Pre-Procedure   Staff -        CRNA: Terese Lozada APRN CRNA       Performed By: CRNA       Location: pre-op       Procedure Start/Stop Times: 3/21/2023 8:58 AM and 3/21/2023 9:01 AM       Pre-Anesthestic Checklist: patient identified, IV checked, site marked, risks and benefits discussed, informed consent, monitors and equipment checked, pre-op evaluation, at physician/surgeon's request and post-op pain management  Timeout:       Correct Patient: Yes        Correct Procedure: Yes        Correct Site: Yes        Correct Position: Yes        Correct Laterality: Yes        Site Marked: Yes  Procedure Documentation  Procedure: Other (IPACK)       Diagnosis: POST OPERATIVE PAIN CONTROL       Laterality: left       Patient Position: supine       Patient Prep/Sterile Barriers: sterile gloves, mask       Skin prep: Chloraprep       Local skin infiltrated with 0.5 mL of. Local skin infiltration: 0.5% Bupivacaine.       Needle Type: insulated and short bevel       Needle Gauge: 20.        Needle Length (Inches): 6        Ultrasound guided       1. Ultrasound was used to identify targeted nerve, plexus, vascular marker, or fascial plane and place a needle adjacent to it in real-time.       2. Ultrasound was used to visualize the spread of anesthetic in close proximity to the above referenced structure.       3. A permanent image is entered into the patient's record.       4. The visualized anatomic structures appeared normal.       5. There were no apparent abnormal pathologic findings.    Assessment/Narrative         The placement was negative for: blood aspirated, painful injection and site bleeding       Paresthesias: No.       Bolus given via needle..        Secured via.        Insertion/Infusion Method: Single Shot       Complications: none    Medication(s) Administered   Bupivacaine 0.5% PF (Infiltration) - Infiltration   15 mL - 3/21/2023 9:01:00 AM  Dexamethasone 10 mg/mL PF  "(Perineural) - Perineural   5 mg - 3/21/2023 9:01:00 AM  Medication Administration Time: 3/21/2023 8:58 AM      FOR [facility] ([facility]) ONLY:   Pain Team Contact information: please page the Pain Team Via [employer]. Search \"Pain\". During daytime hours, please page the attending first. At night please page the resident first.    "

## 2023-03-21 NOTE — CONSULTS
"[facility]  Consult Note - Hospitalist Service  Date of Admission:  3/21/2023  Consult Requested by: Dr. Garcia  Reason for Consult: medical management after elective left total knee arthroplasty    Assessment & Plan   Papo Gonzalez is a 72 year old male admitted on 3/21/2023. He presented today for elective left total knee arthroplasty, performed by Dr. Garcia.  Patient has worsening pain of both knees related to arthritis leading to today's surgery.  Patient is otherwise in good health with no other medical problems.  The plan is for him to recover here overnight and when his pain is under control and he is able to safely ambulate, he will be discharged home with outpatient physical therapy planned    Active Problems:    Primary osteoarthritis of both knees    Status post total left knee replacement         Clinically Significant Risk Factors Present on Admission                       # Obesity: Estimated body mass index is 36.64 kg/m  as calculated from the following:    Height as of this encounter: 1.676 m (5' 6\").    Weight as of this encounter: 103 kg (227 lb).           Michael Barrientos MD  Hospitalist Service  Securely message with Vocera (more info)  Text page via [facility] Paging/Directory   ______________________________________________________________________    Chief Complaint   72-year-old who underwent elective left total knee arthroplasty today    History is obtained from the patient and electronic health record    History of Present Illness   Papo Gonzalez is a 72 year old male who underwent an elective left total knee arthroplasty today by Dr. Garcia.  He has had worsening pain involving both knees for some time leading to today's surgery on the left knee with planned right knee surgery sometime later this year.  He is otherwise in pretty good health, not taking any medicines on a regular basis.  Prior to surgery is otherwise felt well and is planning to be discharged home " with outpatient physical therapy planned      Past Medical History    History reviewed. No pertinent past medical history.    Past Surgical History   Past Surgical History:   Procedure Laterality Date     COLONOSCOPY  07/29/2016    tubular adenomas, repeat 2021       Medications   Medications Prior to Admission   Medication Sig Dispense Refill Last Dose     tamsulosin (FLOMAX) 0.4 MG capsule Take 1 capsule (0.4 mg) by mouth daily 90 capsule 0 3/20/2023          Review of Systems    All other systems reviewed and negative other than noted in the HPI or here.         Physical Exam   Vital Signs: Temp: (!) 96.3  F (35.7  C) Temp src: Tympanic BP: (!) 145/81 Pulse: 79   Resp: 18 SpO2: 95 % O2 Device: None (Room air) Oxygen Delivery: 2 LPM  Weight: 227 lbs 0 oz    General Appearance: Lying in bed, no obvious distress  Respiratory: Lungs are clear  Cardiovascular: Regular rate and rhythm, no murmur heard  GI: Abdomen is soft, nondistended, nontender  Skin: No lesions, rashes or bruising  Other: Left knee and surgical dressings, not removed, CMS toes normal    Medical Decision Making       45 MINUTES SPENT BY ME on the date of service doing chart review, history, exam, documentation & further activities per the note.      Data         Imaging results reviewed over the past 24 hrs:   Recent Results (from the past 24 hour(s))   XR Knee Port Left 1/2 Views    Narrative    XR KNEE PORT LEFT 1/2 VIEWS, 3/21/2023 11:34 AM    History: Male, age 72 years; Post-Op Total Knee    Comparison: 12/6/2022    Technique: Two views were obtained.    FINDINGS: The metallic arthroplasty device is satisfactorily  positioned. Surrounding bone and overlying soft tissues appear grossly  intact.      Impression    IMPRESSION:   1.  Status post metallic arthroplasty. No acute complication.    DALILA BAUER MD         SYSTEM ID:  [id number]

## 2023-03-21 NOTE — PROGRESS NOTES
NS TRANSPORT NOTE  Data:       Papo Gonzalez was transported to room 316 via bed at 1205.  Patient was accompanied by Registered Nurse. Equipment used for transport: IV pump. Family was aware of reason for transport: yes    Action:  Report: received from Brandy, RN    Response:  Patient's condition upon admission was alert and oriented.  Patient rates pain 2/10 and declines any immediate interventions.  Vitals stable.  Ice pack on, ace bandage is clean, dry and intact.  CMS intact.      Meghna Judge RN

## 2023-03-21 NOTE — PROVIDER NOTIFICATION
03/21/23 1352   Initial Information   Patient Belongings remains with patient   Patient Belongings Remaining with Patient clothing;glasses;cell phone/electronics   Did you bring any home meds/supplements to the hospital?  No     [facility] (formerly [facility]) will make every effort per our policy to help keep your items safe while in the hospital.  If you choose to keep any items at the bedside, we cannot be held responsible for any items that are lost or broken.      List items sent to safe: none    I have reviewed my belongings list on admission and verify that it is correct.     Patient signature_______________________________  Date/Time_____________________    2nd Staff person if patient unable to sign __________________________  Date/Time ______________________      I have received all my belongings noted above at discharge.    Patient signature________________________________  Date/Time  __________________________

## 2023-03-21 NOTE — ANESTHESIA PROCEDURE NOTES
"Intrathecal injection Procedure Note    Pre-Procedure   Staff -        CRNA: Christiano Jara APRN CRNA       Performed By: CRNA       Location: OR       Procedure Start/Stop Times: 3/21/2023 9:56 AM and 3/21/2023 10:00 AM       Pre-Anesthestic Checklist: patient identified, IV checked, risks and benefits discussed, informed consent, monitors and equipment checked, pre-op evaluation, at physician/surgeon's request and post-op pain management  Timeout:       Correct Patient: Yes        Correct Procedure: Yes        Correct Site: Yes        Correct Position: Yes   Procedure Documentation  Procedure: intrathecal injection       Diagnosis: primary anesthetic       Patient Position: sitting       Patient Prep/Sterile Barriers: sterile gloves, mask       Skin prep: Chloraprep       Insertion Site: L3-4. (right paramedian approach).       Needle Gauge: 25.        Needle Length (Inches): 5        Spinal Needle Type: Hansa tip       Introducer used       Introducer: 20 G       # of attempts: 1 and  # of redirects:  1    Assessment/Narrative         Paresthesias: No.       CSF fluid: clear.    Medication(s) Administered   Medication Administration Time: 3/21/2023 9:56 AM     Comments:  Not able to reach SA space using 3.5 inch spinal needle.  5 inch needle used and successful insertion into SA space.        FOR [facility] ([facility]) ONLY:   Pain Team Contact information: please page the Pain Team Via [employer]. Search \"Pain\". During daytime hours, please page the attending first. At night please page the resident first.    "

## 2023-03-21 NOTE — ANESTHESIA CARE TRANSFER NOTE
Patient: Papo Gonzalez    Procedure: Procedure(s):  ARTHROPLASTY, KNEE, TOTAL       Diagnosis: Chronic pain of both knees [M25.561, M25.562, G89.29]  Diagnosis Additional Information: No value filed.    Anesthesia Type:   Spinal     Note:    Oropharynx: oropharynx clear of all foreign objects and spontaneously breathing  Level of Consciousness: drowsy  Oxygen Supplementation: room air    Independent Airway: airway patency satisfactory and stable  Dentition: dentition unchanged  Vital Signs Stable: post-procedure vital signs reviewed and stable  Report to RN Given: handoff report given  Patient transferred to: PACU    Handoff Report: Identifed the Patient, Identified the Reponsible Provider, Reviewed the pertinent medical history, Discussed the surgical course, Reviewed Intra-OP anesthesia mangement and issues during anesthesia, Set expectations for post-procedure period and Allowed opportunity for questions and acknowledgement of understanding      Vitals:  Vitals Value Taken Time   BP     Temp     Pulse     Resp     SpO2 93 % 03/21/23 1123   Vitals shown include unvalidated device data.    Electronically Signed By: LUIZ Olivas CRNA  March 21, 2023  11:24 AM

## 2023-03-21 NOTE — OP NOTE
Procedure Date: 03/21/2023    PREOPERATIVE DIAGNOSIS:  Left knee degenerative arthritis with severe varus deformity.    POSTOPERATIVE DIAGNOSIS:  Left knee degenerative arthritis with severe varus deformity.    PROCEDURE:  Left total knee replacement, Qiana Persona posterior stabilized cemented.    ASSISTANT:  Jr Cano PA-C.    ANESTHESIA:  Spinal with block.    COMPLICATIONS:  Without.    IMPLANTS:  Qiana Persona size 10 femoral component, size G baseplate, 10 PS poly and a 35 patella, all cemented.    INDICATIONS:  Mr. Gonzalez is a 72-year-old gentleman with a history of a left knee arthrosis, progressive in nature, unresponsive to conservative measures.  Recommendation was for total joint reconstruction.  The patient did elect to proceed following a discussion of the risks and benefits.    DESCRIPTION OF PROCEDURE:  The patient was taken to the operative suite, administered anesthesia.  Following spinalization, placed in supine position.  Left lower extremity prepped and draped in normal sterile fashion.  Preoperative antibiotics administered and timeout was called.  At this point in time, midline incision was made.  Dissection carried down to extensor mechanism.  Medial parapatellar approach was then performed.  Medial and lateral menisci were removed.  Popliteal retractors were placed about the knee.  Anterior start point was identified.  Drill was used to gain access.  Distal valgus cut was set at 5 degrees.  We did take 11 mm given his flexion contracture.  After that was complete, sized the patient for size 10.  Using posterior reference, applied the 4-in-1 cut block to the distal cut surface and performed anterior cut, posterior cut, and chamfer cuts.  Following femoral preparation, extramedullary guide was utilized for tibial position in proper varus and valgus plane as well as anterior and posterior slope.  After that was complete, did our tibial resection, roughly 2 mm off the medial aspect.  We  then, at that time, checked our gap balance.  We were very happy with the balance we achieved.  Once that was complete, we then medial and lateralized the tibial component, size G.  So, we sized him up, used the drill, followed by the keel punch.  We then medial and lateralized our femoral components and then cut our box for the PF and then trialed with a 10 PS poly, felt we had good flex and extend with regards to that.  After resurfacing of our notch for the post at this time.  At this point in time, we then resurfaced the kneecap, drilled for a 35 mm component.  All trial components were removed.  Final meniscal resections were carried out.  Hemostasis achieved in the posterior capsule, medial and lateral gutters.  We then mixed cement with tobramycin, applied cement to the tibia, following implantation of tibial component, cement application femur, implantation of femoral component, implantation polyethylene and cement application backside of the patella and implantation of patellar component.  Once the cement had cured, we copiously irrigated the knee, closed our retinacular incision with #1 Vicryl, 2-0 Vicryl, and then skin staples and Aquacel dressing.  The patient was transferred to recovery room in stable condition, having tolerated the procedure well.    POSTOPERATIVE PLAN:  Staples out at 2 weeks. See him back at 2 weeks.  Outpatient therapy to begin in 1 week.  Oxycodone for pain management and aspirin for DVT prophylaxis as well.    A skilled first assistant was necessary for position, intraoperative decision making, retraction and exposure during the procedure.    Furthermore, a skilled first assistant was necessary to complete the procedure in a technically safe and efficient manner.    Caleb Garcia MD        D: 2023   T: 2023   MT: [id number]    Name:     YESENIA FONG  MRN:      [id number]        Account:        [id number]   :      1950           Procedure Date: 2023      Document: [id number]

## 2023-03-21 NOTE — BRIEF OP NOTE
[facility]    Brief Operative Note    Pre-operative diagnosis: Chronic pain of both knees [M25.561, M25.562, G89.29]  Post-operative diagnosis Same as pre-operative diagnosis    Procedure: Procedure(s):  ARTHROPLASTY, KNEE, TOTAL  Surgeon: Surgeon(s) and Role:     * Caleb Garcia MD - Primary     * Jr Cano PA-C  Anesthesia: Spinal with Block   Estimated Blood Loss: Less than 50 ml    Drains: None  Specimens: * No specimens in log *  Findings:   None.  Complications: None.  Implants:   Implant Name Type Inv. Item Serial No.  Lot No. LRB No. Used Action   IMP SCR ZIM PSN FEMALE 2.5MM -868-25 - [id number] Metallic Hardware/[place] IMP SCR ZIM PSN FEMALE 2.5MM -938-25  [employer] [id number] Left 1 Used as a Supply   BONE CEMENT SIMPLEX W/TOBRAMYCIN 6197-9-001 - [id number] Cement, Bone BONE CEMENT SIMPLEX W/TOBRAMYCIN 6197-9-001  [employer] [id number] Left 2 Implanted   IMP COMP PATELLA ZIM POST STAB VIVACIT-E 35MM [id number] - [id number] Total Joint Component/Insert IMP COMP PATELLA ZIM POST STAB VIVACIT-E 35MM [id number]  [employer] [id number] Left 1 Implanted   IMP TIBIAL ZIM PSN NP STM 5DEG  GL -392-01 - [id number] Total Joint Component/Insert IMP TIBIAL ZIM PSN NP STM 5DEG  GL -253-01  [employer] [id number] Left 1 Implanted   Persona Femur Left size 10     [employer] [id number] Left 1 Implanted   Persona Articular surface fixed bearing  Left 10mm use with tibia G_H/PS femur size 10-12    [employer] [id number] Left 1 Implanted

## 2023-03-21 NOTE — ANESTHESIA POSTPROCEDURE EVALUATION
Patient: Papo Gonzalez    Procedure: Procedure(s):  ARTHROPLASTY, KNEE, TOTAL       Anesthesia Type:  Spinal    Note:  Disposition: Outpatient   Postop Pain Control: Uneventful            Sign Out: Well controlled pain   PONV: No   Neuro/Psych: Uneventful            Sign Out: Acceptable/Baseline neuro status   Airway/Respiratory: Uneventful            Sign Out: Acceptable/Baseline resp. status   CV/Hemodynamics: Uneventful            Sign Out: Acceptable CV status; No obvious hypovolemia; No obvious fluid overload   Other NRE: NONE   DID A NON-ROUTINE EVENT OCCUR? No           Last vitals:  Vitals Value Taken Time   /106 03/21/23 1150   Temp 97.2  F (36.2  C) 03/21/23 1140   Pulse 80 03/21/23 1153   Resp 16 03/21/23 1153   SpO2 93 % 03/21/23 1153   Vitals shown include unvalidated device data.    Electronically Signed By: LUIZ Olivas CRNA  March 21, 2023  1:04 PM

## 2023-03-22 ENCOUNTER — APPOINTMENT (OUTPATIENT)
Dept: OCCUPATIONAL THERAPY | Facility: OTHER | Age: 73
End: 2023-03-22
Attending: ORTHOPAEDIC SURGERY
Payer: MEDICARE

## 2023-03-22 ENCOUNTER — APPOINTMENT (OUTPATIENT)
Dept: PHYSICAL THERAPY | Facility: OTHER | Age: 73
End: 2023-03-22
Attending: ORTHOPAEDIC SURGERY
Payer: MEDICARE

## 2023-03-22 VITALS
WEIGHT: 227 LBS | BODY MASS INDEX: 36.48 KG/M2 | TEMPERATURE: 97.5 F | HEART RATE: 91 BPM | RESPIRATION RATE: 18 BRPM | OXYGEN SATURATION: 92 % | HEIGHT: 66 IN | SYSTOLIC BLOOD PRESSURE: 133 MMHG | DIASTOLIC BLOOD PRESSURE: 77 MMHG

## 2023-03-22 LAB
GLUCOSE BLDC GLUCOMTR-MCNC: 147 MG/DL (ref 70–99)
HGB BLD-MCNC: 13.8 G/DL (ref 13.3–17.7)
HOLD SPECIMEN: NORMAL

## 2023-03-22 PROCEDURE — 97535 SELF CARE MNGMENT TRAINING: CPT | Mod: GO | Performed by: OCCUPATIONAL THERAPIST

## 2023-03-22 PROCEDURE — 999N000104 HC STATISTIC NO CHARGE

## 2023-03-22 PROCEDURE — 96376 TX/PRO/DX INJ SAME DRUG ADON: CPT

## 2023-03-22 PROCEDURE — 250N000011 HC RX IP 250 OP 636: Performed by: ORTHOPAEDIC SURGERY

## 2023-03-22 PROCEDURE — 250N000013 HC RX MED GY IP 250 OP 250 PS 637: Performed by: ORTHOPAEDIC SURGERY

## 2023-03-22 PROCEDURE — 36415 COLL VENOUS BLD VENIPUNCTURE: CPT | Performed by: ORTHOPAEDIC SURGERY

## 2023-03-22 PROCEDURE — 97110 THERAPEUTIC EXERCISES: CPT | Mod: GP

## 2023-03-22 PROCEDURE — 85018 HEMOGLOBIN: CPT | Performed by: ORTHOPAEDIC SURGERY

## 2023-03-22 PROCEDURE — 99213 OFFICE O/P EST LOW 20 MIN: CPT | Performed by: FAMILY MEDICINE

## 2023-03-22 PROCEDURE — 97530 THERAPEUTIC ACTIVITIES: CPT | Mod: GO | Performed by: OCCUPATIONAL THERAPIST

## 2023-03-22 PROCEDURE — 82962 GLUCOSE BLOOD TEST: CPT

## 2023-03-22 RX ADMIN — ACETAMINOPHEN 975 MG: 325 TABLET ORAL at 08:38

## 2023-03-22 RX ADMIN — KETOROLAC TROMETHAMINE 15 MG: 15 INJECTION, SOLUTION INTRAMUSCULAR; INTRAVENOUS at 08:40

## 2023-03-22 RX ADMIN — CEFAZOLIN SODIUM 2 G: 2 INJECTION, SOLUTION INTRAVENOUS at 02:05

## 2023-03-22 RX ADMIN — ASPIRIN 81 MG: 81 TABLET, COATED ORAL at 09:23

## 2023-03-22 RX ADMIN — KETOROLAC TROMETHAMINE 15 MG: 15 INJECTION, SOLUTION INTRAMUSCULAR; INTRAVENOUS at 02:05

## 2023-03-22 ASSESSMENT — ACTIVITIES OF DAILY LIVING (ADL)
ADLS_ACUITY_SCORE: 23

## 2023-03-22 NOTE — PROGRESS NOTES
I had discussed getting a PET scan with Dr. Maza.  I will arrange that and see the patient next week.   Incentive Spirometry education completed.  Pt goal 2500 mls.  Pt achieved 2000 mls.  Pt instructed to perform 10/hr while awake with at least one deep breath and cough per hour until able to perform baseline activity.  RT will follow and re-assess as need.      Mariposa Crawley, RT on 3/21/2023 at 7:26 PM

## 2023-03-22 NOTE — PROGRESS NOTES
DISCHARGE NOTE    Patient discharged to home at 12:48 PM via wheel chair. Accompanied by spouse and staff. Discharge instructions reviewed with patient, opportunity offered to ask questions. Prescriptions sent to patients preferred pharmacy. All belongings sent with patient.    Tamie York RN

## 2023-03-22 NOTE — PHARMACY - DISCHARGE MEDICATION RECONCILIATION AND EDUCATION
Pharmacy:  Discharge Counseling and Medication Reconciliation    Papo Gonzalez  [address] [facility] [address]  [phone] (home)   72 year old male  PCP: Zack Roa    Allergies: Patient has no known allergies.    Discharge Counseling:    Pharmacist met with patient (and/or family) today to review the medication portion of the After Visit Summary (with an emphasis on NEW medications) and to address patient's questions/concerns.    Summary of Education: Discussed patients newly prescribed medication  Oxycodone, used to treat severe pain for the first few days post op. May cause some drowsiness, dizziness, and constipation. No refills as this is a controlled medication  Senna/Docusate- used to treat constipation from oxycodone.   Ibuprofen- used to treat pain, no more than 4 a day. Strength is equal to 3 OTC pills. May cause upset stomach, take with food.  Aspirin 81- taking twice a day for 30 days, may cause upset stomach. May take with food.  Materials Provided:  MedCounselor sheets printed from Clinical Pharmacology on: Aspirin, Ibuprofen, Oxycodone, Senna docusate.    Discharge Medication Reconciliation:    It has been determined that the patient has an adequate supply of medications available or which can be obtained from the patient's preferred pharmacy, which HE/SHE has confirmed as: [facility].    Thank you for the consult.    Spencer Del Rosario........March 22, 2023 11:53 AM

## 2023-03-22 NOTE — PROGRESS NOTES
03/21/23 1527   Appointment Info   Signing Clinician's Name / Credentials (PT) Stevenson Luis Armando MPT   Living Environment   People in Home significant other   Current Living Arrangements house   Home Accessibility stairs within home   Number of Stairs, Within Home, Primary greater than 10 stairs   Stair Railings, Within Home, Primary railings safe and in good condition   Transportation Anticipated car, drives self;family or friend will provide   Self-Care   Usual Activity Tolerance good   Current Activity Tolerance moderate   Equipment Currently Used at Home none   Fall history within last six months no   General Information   Referring Physician Radha   Patient/Family Therapy Goals Statement (PT) return home   Pertinent History of Current Problem (include personal factors and/or comorbidities that impact the POC) s/p left TKA   Existing Precautions/Restrictions fall   Weight-Bearing Status - LLE weight-bearing as tolerated   Weight-Bearing Status - RLE full weight-bearing   Cognition   Affect/Mental Status (Cognition) WFL   Orientation Status (Cognition) oriented x 4   Follows Commands (Cognition) WFL   Pain Assessment   Patient Currently in Pain Yes, see Vital Sign flowsheet   Integumentary/Edema   Integumentary/Edema Comments surgical wound left knee   Posture    Posture Not impaired   Range of Motion (ROM)   Range of Motion ROM is WFL   ROM Comment left knee 10-65 degrees (with ACE wrap on knee)   Strength (Manual Muscle Testing)   Strength (Manual Muscle Testing) strength is WFL   Strength Comments however, left LE fatigues with activity   Bed Mobility   Impairments Contributing to Impaired Bed Mobility pain;decreased strength   Comment, (Bed Mobility) minimal assist   Transfers   Impairments Contributing to Impaired Transfers pain;decreased strength   Comment, (Transfers) minimal assist with Fww   Gait/Stairs (Locomotion)   [place] Level (Gait) contact guard   Assistive Device (Gait) walker, front-wheeled    Distance in Feet 15   Pattern (Gait) 3-point;step-to   Balance   Balance Comments good with Fww   Sensory Examination   Sensory Perception Comments patient report of mild decrease in touch sensation over left knee   Coordination   Coordination no deficits were identified   Muscle Tone   Muscle Tone no deficits were identified   Clinical Impression   Criteria for Skilled Therapeutic Intervention Yes, treatment indicated   PT Diagnosis (PT) impaired mobility   Influenced by the following impairments pain, fatigue   Functional limitations due to impairments activity/gait tolerance   Clinical Presentation (PT Evaluation Complexity) Stable/Uncomplicated   Clinical Decision Making (Complexity) low complexity   Planned Therapy Interventions (PT) bed mobility training;gait training;home exercise program;transfer training   Risk & Benefits of therapy have been explained evaluation/treatment results reviewed;risks/benefits reviewed;patient;spouse/significant other   PT Total Evaluation Time   PT Eval, Low Complexity Minutes (60689) 15   Physical Therapy Goals   PT Frequency Daily   PT Predicted Duration/Target Date for Goal Attainment 03/22/23   PT Goals Bed Mobility;Transfers;Gait   PT: Bed Mobility Supervision/stand-by assist   PT: Transfers Supervision/stand-by assist;Assistive device   PT: Gait Supervision/stand-by assist;Rolling walker;150 feet   Total Session Time   Total Session Time (sum of timed and untimed services) 15

## 2023-03-22 NOTE — PROGRESS NOTES
"[facility]    Medicine Progress Note - Hospitalist Service    Date of Admission:  3/21/2023    Assessment & Plan   Primary osteoarthritis of both knees  Surgery on 3/21/2023- POD#1 total knee arthroplasty    Status post total left knee replacement  POD #1 knee arthroplasty  Up to bathroom, pain controlled. Will work with PT and likely home later today if safe           Diet: Advance Diet as Tolerated: Regular Diet Adult  Discharge Instruction - Regular Diet Adult    DVT Prophylaxis: Pneumatic Compression Devices  Castillo Catheter: Not present  Lines: None     Cardiac Monitoring: None  Code Status: Full Code      Clinically Significant Risk Factors Present on Admission                       # Obesity: Estimated body mass index is 36.64 kg/m  as calculated from the following:    Height as of this encounter: 1.676 m (5' 6\").    Weight as of this encounter: 103 kg (227 lb).           Disposition Plan     Expected Discharge Date: 03/22/2023                  Michael Barrientos MD  Hospitalist Service  [facility]  Securely message with [employer] info)  Text page via [employer] Paging/Directory   ______________________________________________________________________    Interval History   Did not sleep well last nite, between interruptions and pain. Up to bathroom, did well, planning to go home today if able.    Physical Exam   Vital Signs: Temp: 97.5  F (36.4  C) Temp src: Tympanic BP: 133/77 Pulse: 91   Resp: 18 SpO2: 92 % O2 Device: None (Room air) Oxygen Delivery: 2 LPM  Weight: 227 lbs 0 oz    Constitutional: awake, alert, cooperative, no apparent distress, and appears stated age  Respiratory: No increased work of breathing, good air exchange, clear to auscultation bilaterally, no crackles or wheezing  Cardiovascular: regular rate and rhythm  Musculoskeletal: knee dressing intact, minimal swelling    Medical Decision Making       30 MINUTES SPENT BY ME on the date of service " doing chart review, history, exam, documentation & further activities per the note.      Data   Imaging results reviewed over the past 24 hrs:   Recent Results (from the past 24 hour(s))   XR Knee Port Left 1/2 Views    Narrative    XR KNEE PORT LEFT 1/2 VIEWS, 3/21/2023 11:34 AM    History: Male, age 72 years; Post-Op Total Knee    Comparison: 12/6/2022    Technique: Two views were obtained.    FINDINGS: The metallic arthroplasty device is satisfactorily  positioned. Surrounding bone and overlying soft tissues appear grossly  intact.      Impression    IMPRESSION:   1.  Status post metallic arthroplasty. No acute complication.    DALILA BAUER MD         SYSTEM ID:  [id number]

## 2023-03-22 NOTE — PROGRESS NOTES
03/22/23 0301   Appointment Info   Signing Clinician's Name / Credentials (PT) Stevenson Luis Armando MPT   Gait/Stairs (Locomotion)   Distance in Feet (Gait) 150   Interventions   Interventions Quick Adds Gait Training;Therapeutic Activity;Therapeutic Procedure   Therapeutic Procedure/Exercise   Treatment Detail/Skilled Intervention review of home exercise program with fair left knee ROM (10-80 degrees) pain control and quadricept contraction   Therapeutic Activity   Treatment Detail/Skilled Intervention minimal assist to SBA for supine<>sit (left LE support)   Gait Training   Symptoms Noted During/After Treatment (Gait Training) fatigue;increased pain   Treatment Detail/Skilled Intervention ambulation in hallway   [place] Level (Gait Training) stand-by assist   Physical Assistance Level (Gait Training) supervision   Weight Bearing (Gait Training) weight-bearing as tolerated   Assistive Device (Gait Training) rolling walker   Pattern Analysis (Gait Training) 2-point gait   Gait Analysis Deviations decreased kamila;decreased velocity of limb motion;decreased step length   Impairments (Gait Analysis/Training) pain;strength decreased   PT Discharge Planning   PT Plan patient discharging   PT Discharge Recommendation (DC Rec) home with assist;home with home care physical therapy   PT Rationale for DC Rec to promote strength, stability and safe mobility   PT Brief overview of current status minimal assist for left LE support with supine<>sit; SBA for transfers and ambulation using a Fww; multiple calls by PT to schedule patient for outpatient PT

## 2023-03-22 NOTE — DISCHARGE SUMMARY
[facility]  Hospitalist Discharge Summary      Date of Admission:  3/21/2023  Date of Discharge:  3/22/2023  Discharging Provider: Michael Barrientos MD  Discharge Service: Hospitalist Service    Discharge Diagnoses   Active Problems:    Primary osteoarthritis of both knees    Status post total left knee replacement        Follow-ups Needed After Discharge   Follow-up Appointments     Follow Up Care      Follow-up with your Surgeon Team in 2 weeks for wound check.             Unresulted Labs Ordered in the Past 30 Days of this Admission     No orders found for last 31 day(s).      These results will be followed up by Dr. Garcia    Discharge Disposition   Discharged to home  Condition at discharge: Satisfactory      Hospital Course   Primary osteoarthritis of both knees  Surgery on 3/21/2023- POD#1 total knee arthroplasty    Status post total left knee replacement  POD #1 knee arthroplasty  Up to bathroom, pain controlled. Will work with PT and likely home later today if safe        Consultations This Hospital Stay   HOSPITALIST IP CONSULT  PHYSICAL THERAPY ADULT IP CONSULT  OCCUPATIONAL THERAPY ADULT IP CONSULT    Code Status   Full Code    Time Spent on this Encounter   I, Michael Barrientos MD, personally saw the patient today and spent less than or equal to 30 minutes discharging this patient.       Michael Barrientos MD  [facility] AND \[facility] [place] [facility]\""  [address]  [place] [address]  Phone: [phone]  Fax: [phone]  ______________________________________________________________________    Physical Exam   Vital Signs: Temp: 97.5  F (36.4  C) Temp src: Tympanic BP: 133/77 Pulse: 91   Resp: 18 SpO2: 92 % O2 Device: None (Room air)    Weight: 227 lbs 0 oz  Constitutional: awake, alert, cooperative, no apparent distress, and appears stated age  Respiratory: No increased work of breathing, good air exchange, clear to auscultation bilaterally, no crackles or  "wheezing  Cardiovascular: regular rate and rhythm  Musculoskeletal: knee with minimal swelling, no signs of new bleeding       Primary Care Physician   Zack Roa    Discharge Orders      Reason for your hospital stay    Status Post Left Total Knee Arthroplasty     When to call - Contact Surgeon Team    You may experience symptoms that require follow-up before your scheduled appointment. Refer to the \"Stoplight Tool\" for instructions on when to contact your Surgeon Team if you are concerned about pain control, blood clots, constipation, or if you are unable to urinate.     When to call - Reach out to Urgent Care    If you are not able to reach your Surgeon Team and you need immediate care, go to the Orthopedic Walk-in Clinic or Urgent Care at your Surgeon's office.  Do NOT go to the Emergency Room unless you have shortness of breath, chest pain, or other signs of a medical emergency.     When to call - Reasons to Call 911    Call 911 immediately if you experience sudden-onset chest pain, arm weakness/numbness, slurred speech, or shortness of breath     Discharge Instruction - Breathing exercises    Perform breathing exercises using your Incentive Spirometer 10 times per hour while awake for 2 weeks.     Symptoms - Fever Management    A low grade fever can be expected after surgery.  Use acetaminophen (TYLENOL) as needed for fever management.  Contact your Surgeon Team if you have a fever greater than 101.5 F, chills, and/or night sweats.     Symptoms - Constipation management    Constipation (hard, dry bowel movements) is expected after surgery due to the combination of being less active, the anesthetic, and the opioid pain medication.  You can do the following to help reduce constipation:  ~  FLUIDS:  Drink clear liquids (water or Gatorade), or juice (apple/prune).  ~  DIET:  Eat a fiber rich diet.    ~  ACTIVITY:  Get up and move around several times a day.  Increase your activity as you are " able.  MEDICATIONS:  Reduce the risk of constipation by starting medications before you are constipated.  You can take Miralax   (1 packet as directed) and/or a stool softener (Senokot 1-2 tablets 1-2 times a day).  If you already have constipation and these medications are not working, you can get magnesium citrate and use as directed.  If you continue to have constipation you can try an over the counter suppository or enema.  Call your Surgeon Team if it has been greater than 3 days since your last bowel movement.     Symptoms - Reduced Urine Output    Changes in the amount of fluids you drank before and after surgery may result in problems urinating.  It is important to stay well-hydrated after surgery and drink plenty of water. If it has been greater than 8 hours since you have urinated despite drinking plenty of water, call your Surgeon Team.     Activity - Exercises to prevent blood clots    Unless otherwise directed by your Surgeon team, perform the following exercises at least three times per day for the first four weeks after surgery to prevent blood clots in your legs: 1) Point and flex your feet (Ankle Pumps), 2) Move your ankle around in big circles, 3) Wiggle your toes, 4) Walk, even for short distances, several times a day, will help decrease the risk of blood clots.     Comfort and Pain Management - Pain after Surgery    Pain after surgery is normal and expected.  You will have some amount of pain for several weeks after surgery.  Your pain will improve with time.  There are several things you can do to help reduce your pain including: rest, ice, elevation, and using pain medications as needed. Contact your Surgeon Team if you have pain that persists or worsens after surgery despite rest, ice, elevation, and taking your medication(s) as prescribed. Contact your Surgeon Team if you have new numbness, tingling, or weakness in your operative extremity.     Comfort and Pain Management - Swelling after  "Surgery    Swelling and/or bruising of the surgical extremity is common and may persist for several months after surgery. In addition to frequent icing and elevation, gentle compressive support with an ACE wrap or tubigrip may help with swelling. Apply compression regularly, removing at least twice daily to perform skin checks. Contact your Surgeon Team if your swelling increases and is NOT associated with an increase in your activity level, or if your swelling increases and is associated with redness and pain.     Comfort and Pain Management - LOWER Extremity Elevation    Swelling is expected for several months after surgery. This type of swelling is usually associated with gravity and activity, and can be improved with elevation.   The best way to do this is to get your \"toes above your nose\" by laying down and placing several pillows lengthwise under your calf and heel. When elevating your leg keep your knee completely straight. Perform this elevation as often as possible especially for the first two weeks after surgery.     Comfort and Pain Management - Cold therapy    Ice can be used to control swelling and discomfort after surgery. Place a thin towel over your operative site and apply the ice pack overtop. Leave ice pack in place for 20 minutes, then remove for 20 minutes. Repeat this 20 minutes on/20 minutes off routine as often as tolerated.     Medication Instructions - Acetaminophen (TYLENOL) Instructions    You were discharged with acetaminophen (TYLENOL) for pain management after surgery. Acetaminophen most effectively manages pain symptoms when it is taken on a schedule without missing doses (every four, six, or eight hours). Your Provider will prescribe a safe daily dose between 3000 - 4000 mg.  Do NOT exceed this daily dose. Most patients use acetaminophen for pain control for the first four weeks after surgery.  You can wean from this medication as your pain decreases.     Medication Instructions - " NSAID Instructions    You were discharged with an anti-inflammatory medication for pain management to use in combination with acetaminophen (TYLENOL) and the narcotic pain medication.  Take this medication exactly as directed.  You should only take one anti-inflammatory at a time.  Some common anti-inflammatories include: ibuprofen (ADVIL, MOTRIN), naproxen (ALEVE, NAPROSYN), celecoxib (CELEBREX), meloxicam (MOBIC), ketorolac (TORADOL).  Take this medication with food and water.     Medication Instructions - Opioids - Tapering Instructions    In the first three days following surgery, your symptoms may warrant use of the narcotic pain medication every four to six hours as prescribed. This is normal. As your pain symptoms improve, focus your efforts on decreasing (tapering) use of narcotic medications. The most successful tapering strategy is to first, decrease the number of tablets you take every 4-6 hours to the minimum prescribed. Then, increase the amount of time between doses.  For example:  First, taper to   or 1 tablet every 4-6 hours.  Then, taper to   or 1 tablet every 6-8 hours.  Then, taper to   or 1 tablet every 8-10 hours.  Then, taper to   or 1 tablet every 10-12 hours.  Then, taper to   or 1 tablet at bedtime.  The bedtime dose can help with comfort during sleep and is typically the last dose to be discontinued after surgery.     Follow Up Care    Follow-up with your Surgeon Team in 2 weeks for wound check.     Activity - Total Knee Arthroplasty     Return to Driving    Return to driving - Driving is NOT permitted until directed by your provider. Under no circumstance are you permitted to drive while using narcotic pain medications.     Dressing / Wound Care - Wound    You have a clean dressing on your surgical wound. Dressing change instructions as follows: dressing will be removed at your follow-up appointment. Contact your Surgeon Team if you have increased redness, warmth around the surgical wound,  and/or drainage from the surgical wound.     Dressing / Wound Care - NO Tub Bathing    Tub bathing, swimming, or any other activities that will cause your incision to be submerged in water should be avoided until allowed by your Surgeon.     Medication instructions -  Anticoagulation - aspirin    Take the aspirin as prescribed for a total of four weeks after surgery.  This is given to help minimize your risk of blood clot.     Medication Instructions - Opioid Instructions (Greater than or equal to 65 years)    You were discharged with an opioid medication (hydromorphone, oxycodone, hydrocodone, or tramadol). This medication should only be taken for breakthrough pain that is not controlled with acetaminophen (TYLENOL). If you rate your pain less than 3 you do not need this medication.  Pain rating 0-3:  You do not need this medication  Pain rating 4-6:  Take 1/2 tablet every 4-6 hours as needed  Pain rating 7-10:  Take 1 tablet every 4-6 hours as needed  Do not exceed 4 tablets per day     NO Precautions    No precautions directed by your Provider.  You may perform range of motion activities as tolerated.     Weight bearing as tolerated    Weight bearing as tolerated on your operative extremity.     Dressing / Wound care - Shower with wound/dressing covered    You must COVER your dressing or incision with saran wrap (or any other non-permeable covering) to allow the incision to remain dry while showering.  You may shower 3 days after surgery as long as the surgical wound stays dry. Continue to cover your dressing or incision for showering until your first office visit.  You are strictly prohibited from soaking   or submerging the surgical wound underwater.     Discharge Instruction - Regular Diet Adult    Return to your pre-surgery diet unless instructed otherwise       Significant Results and Procedures   Most Recent 3 Hemoglobins:Recent Labs   Lab Test 03/22/23  0554 03/07/23  1448   HGB 13.8 16.9   ,   Results for  orders placed or performed during the hospital encounter of 03/21/23   XR Knee Port Left 1/2 Views    Narrative    XR KNEE PORT LEFT 1/2 VIEWS, 3/21/2023 11:34 AM    History: Male, age 72 years; Post-Op Total Knee    Comparison: 12/6/2022    Technique: Two views were obtained.    FINDINGS: The metallic arthroplasty device is satisfactorily  positioned. Surrounding bone and overlying soft tissues appear grossly  intact.      Impression    IMPRESSION:   1.  Status post metallic arthroplasty. No acute complication.    DALILA BAUER MD         SYSTEM ID:  [id number]       Discharge Medications   Current Discharge Medication List      START taking these medications    Details   aspirin 81 MG EC tablet Take 1 tablet (81 mg) by mouth 2 times daily  Qty: 60 tablet, Refills: 0    Associated Diagnoses: Chronic pain of left knee      ibuprofen (ADVIL/MOTRIN) 600 MG tablet Take 1 tablet (600 mg) by mouth every 6 hours as needed for mild pain  Qty: 30 tablet, Refills: 0    Associated Diagnoses: Chronic pain of left knee      oxyCODONE (ROXICODONE) 5 MG tablet Take 1 tablet (5 mg) by mouth every 4 hours as needed for moderate to severe pain  Qty: 20 tablet, Refills: 0    Associated Diagnoses: Chronic pain of left knee      senna-docusate (SENOKOT-S/PERICOLACE) 8.6-50 MG tablet Take 1-2 tablets by mouth 2 times daily Take while on oral narcotics to prevent or treat constipation.  Qty: 30 tablet, Refills: 0    Comments: While taking narcotics  Associated Diagnoses: Chronic pain of left knee         CONTINUE these medications which have NOT CHANGED    Details   tamsulosin (FLOMAX) 0.4 MG capsule Take 1 capsule (0.4 mg) by mouth daily  Qty: 90 capsule, Refills: 0    Associated Diagnoses: Urinary hesitancy           Allergies   No Known Allergies

## 2023-03-22 NOTE — PLAN OF CARE
Physical Therapy Discharge Summary    Reason for therapy discharge:    Discharged to home with outpatient therapy.    Progress towards therapy goal(s). See goals on Care Plan in [facility] electronic health record for goal details.  Goals met    Therapy recommendation(s):    Continued therapy is recommended.  Rationale/Recommendations:  to promote return of left knee strength and function.    Goal Outcome Evaluation:

## 2023-03-22 NOTE — PROGRESS NOTES
03/21/23 1500   Appointment Info   Signing Clinician's Name / Credentials (OT) Trina Meza, OTS; Neema Childress, OTR/L   Living Environment   People in Home significant other   Current Living Arrangements house   Home Accessibility stairs within home  (Stairs in home, but do not have to go up/down them.)   Number of Stairs, Within Home, Primary greater than 10 stairs   Stair Railings, Within Home, Primary railings safe and in good condition   Transportation Anticipated car, drives self;family or friend will provide   Self-Care   Usual Activity Tolerance good   Current Activity Tolerance moderate   Equipment Currently Used at Home none   Fall history within last six months no   General Information   Onset of Illness/Injury or Date of Surgery 03/21/23   Referring Physician Caleb Garcia MD   Cognitive Status Examination   Orientation Status orientation to person, place and time   Affect/Mental Status (Cognitive) WNL   Follows Commands WNL   Pain Assessment   Patient Currently in Pain Yes, see Vital Sign flowsheet  (Pt reported having pain at 4/10)   Clinical Impression   Criteria for Skilled Therapeutic Interventions Met (OT) Yes, treatment indicated   OT Diagnosis Decreased mobility and self-cares s/p TKA   OT Problem List-Impairments impacting ADL activity tolerance impaired;strength;mobility   Assessment of Occupational Performance 1-3 Performance Deficits   Planned Therapy Interventions (OT) ADL retraining;strengthening;progressive activity/exercise   Clinical Decision Making Complexity (OT) low complexity   Anticipated Equipment Needs Upon Discharge (OT)   (Pt has walker and shower chair at home.)   Risk & Benefits of therapy have been explained risks/benefits reviewed   OT Total Evaluation Time   OT Eval, Low Complexity Minutes (56168) 15   OT Goals   Therapy Frequency (OT) One time eval and treatment   OT Predicted Duration/Target Date for Goal Attainment 03/22/23   OT Goals Lower Body Dressing;Lower Body  Bathing;Transfers;Toilet Transfer/Toileting   OT: Lower Body Dressing Modified independent   OT: Lower Body Bathing Modified independent   OT: Transfer Independent   OT: Toilet Transfer/Toileting Independent   Interventions   Interventions Quick Adds Therapeutic Activity   Therapeutic Activities   Therapeutic Activity Minutes (46955) 15   Symptoms noted during/after treatment none  (Reported having a headache, but states that was there prior to OT.)   Treatment Detail/Skilled Intervention Moved from supine to sit at EOB with SBA to help guide legs. Pt ambulated from EOB to recliner with CGA for safety and FWW.   OT Discharge Planning   OT Plan Continue OT while hospitalized.   OT Discharge Recommendation (DC Rec) home with assist   OT Rationale for DC Rec Pt will receive assistance from spouse as needed. Will attend outpatient physical therapy. Will continue to assess further for OT needs at time of discharge.   OT Brief overview of current status Pt was very pleasant during evaluation. Spouse was present. Pt motivated to get up and ambulated well from EOB to recliner with CGA for safety and with FWW. Reported pain at a 4/10.   Total Session Time   Timed Code Treatment Minutes 15   Total Session Time (sum of timed and untimed services) 30

## 2023-03-22 NOTE — PLAN OF CARE
"Goal Outcome Evaluation:    Pt is alert and orientated. /77 (BP Location: Left arm, Patient Position: Semi-Flowers's, Cuff Size: Adult Large)   Pulse 91   Temp 97.5  F (36.4  C) (Tympanic)   Resp 18   Ht 1.676 m (5' 6\")   Wt 103 kg (227 lb)   SpO2 92%   BMI 36.64 kg/m  .   Lung sounds clear. Pt c/o pain 2/10, scheduled pain medications given and ice applied. See MAR. Ace wrap removed from LLE, dressing is clean, dry, and intact. Physical therapy walked pt in [facility]. Will continue to monitor. Tamie York RN on 3/22/2023 at 9:06 AM        "

## 2023-03-22 NOTE — PLAN OF CARE
Occupational Therapy Discharge Summary    Reason for therapy discharge:    Discharged to home with outpatient therapy.    Progress towards therapy goal(s). See goals on Care Plan in [facility] electronic health record for goal details.  Goals partially met.  Barriers to achieving goals:   discharge from facility.    Therapy recommendation(s):    No further therapy is recommended. Patient will receive assist from spouse as needed. Will be attending outpatient physical therapy. No further occupational therapy concerns at this time.

## 2023-03-22 NOTE — PLAN OF CARE
Goal Outcome Evaluation:      Plan of Care Reviewed With: patient    Overall Patient Progress: improving    Patient is alert and oriented, calm and cooperative. Patient is vitally stable and lungs are clear, patient rates pain at a 3/10. Scheduled meds and ice packs have been effective for patient for pain control. Dressing over left knee is clean, dry and intact, no signs of bleeding or drainage. Assist X1 with walker.

## 2023-03-22 NOTE — PLAN OF CARE
"Goal Outcome Evaluation:    Pt is alert and orientated. /77 (BP Location: Left arm, Patient Position: Semi-Flowers's, Cuff Size: Adult Large)   Pulse 91   Temp 97.5  F (36.4  C) (Tympanic)   Resp 18   Ht 1.676 m (5' 6\")   Wt 103 kg (227 lb)   SpO2 92%   BMI 36.64 kg/m     Lung sounds clear. Pt denies any nausea. Pain controlled with medications. See MAR. Voiding without difficulty. ACE wrap removed, dressing is clean, dry, and intact. Good CMS. Will continue to monitor. Tamie York RN on 3/22/2023 at 1:48 PM        "

## 2023-03-22 NOTE — ASSESSMENT & PLAN NOTE
POD #1 knee arthroplasty  Up to bathroom, pain controlled. Will work with PT and likely home later today if safe

## 2023-03-23 ENCOUNTER — PATIENT OUTREACH (OUTPATIENT)
Dept: FAMILY MEDICINE | Facility: OTHER | Age: 73
End: 2023-03-23
Payer: MEDICARE

## 2023-03-23 DIAGNOSIS — Z96.652 STATUS POST TOTAL LEFT KNEE REPLACEMENT: Primary | ICD-10-CM

## 2023-03-23 NOTE — TELEPHONE ENCOUNTER
Patient has orthopedic surgery follow-up only.  No TCM call required per policy.    Corinne R Thayer, RN on 3/23/2023 at 8:42 AM

## 2023-03-31 ENCOUNTER — HOSPITAL ENCOUNTER (OUTPATIENT)
Dept: PHYSICAL THERAPY | Facility: OTHER | Age: 73
Setting detail: THERAPIES SERIES
Discharge: HOME | End: 2023-03-31
Attending: ORTHOPAEDIC SURGERY
Payer: MEDICARE

## 2023-03-31 DIAGNOSIS — Z96.652 STATUS POST TOTAL LEFT KNEE REPLACEMENT: ICD-10-CM

## 2023-03-31 PROCEDURE — 97110 THERAPEUTIC EXERCISES: CPT | Mod: GP

## 2023-03-31 PROCEDURE — 97161 PT EVAL LOW COMPLEX 20 MIN: CPT | Mod: GP

## 2023-03-31 PROCEDURE — 97016 VASOPNEUMATIC DEVICE THERAPY: CPT | Mod: GP

## 2023-03-31 NOTE — PROGRESS NOTES
"   03/31/23 0700   General Information   Type of Visit Initial OP Ortho PT Evaluation   Start of Care Date 03/31/23   Referring Physician MD Radha   Patient/Family Goals Statement \"be able to bend my knee\"   Orders Evaluate and Treat   Date of Order 03/23/23   Certification Required? Yes   Medical Diagnosis Z96.652 (ICD-10-CM) - Status post total left knee replacement   Surgical/Medical history reviewed Yes   Precautions/Limitations no known precautions/limitations   Body Part(s)   Body Part(s) Knee   Presentation and Etiology   Pertinent history of current problem (include personal factors and/or comorbidities that impact the POC) Patient had L TKA on 3/21/2023. Reports pain is a 2-3/10. This is first joint replacement for patient. Patient states he has had B knee problems for ~20 years (has plans to get R knee replaced later this year). Patient reports right now biggest concern is swelling and difficulty with bending knee.   Impairments A. Pain;C. Swelling;D. Decreased ROM;E. Decreased flexibility;F. Decreased strength and endurance;H. Impaired gait   Functional Limitations perform activities of daily living;perform required work activities   Symptom Location L knee   How/Where did it occur Other  (post-surgical)   Onset date of current episode/exacerbation 03/21/23   Chronicity New   Pain rating (0-10 point scale) Best (/10);Worst (/10)   Best (/10) 2   Worst (/10) 9   Pain quality B. Dull   Frequency of pain/symptoms C. With activity   Pain/symptoms exacerbated by C. Lifting;D. Carrying;E. Rest;I. Bending;J. ADL   Pain/symptoms eased by E. Changing positions;H. Cold   Current / Previous Interventions   Diagnostic Tests: X-ray   X-ray Results Results   X-ray results X-ray post-op: IMPRESSION:   1.  Status post metallic arthroplasty. No acute complication.   Prior Level of Function   Prior Level of Function-Mobility Ind   Prior Level of Function-ADLs Ind   Functional Level Prior Comment without AD at baseline "   Current Level of Function   Patient role/employment history F. Retired  (Computer work)   [place]   Current equipment-Gait/Locomotion Standard cane   Fall Risk Screen   Fall screen completed by PT   Have you fallen 2 or more times in the past year? No   Abuse Screen (yes response referral indicated)   Feels Unsafe at Home or Work/School no   Feels Threatened by Someone no   Does Anyone Try to Keep You From Having Contact with Others or Doing Things Outside Your Home? no   Physical Signs of Abuse Present no   Knee Objective Findings   Gait/Locomotion Patient ambulates with wide base of support, minimal knee flexion/extension on the L; varus thrust on the R with stance and slow kamila. Cued for heel contact/toe off gait with better L knee mobility. Patient uses SPC for ambulation.   Knee ROM Comment Of note: R knee AROM -10 to 106 degrees at eval   Integumentary  bruising around posterior calf and shin; ecchmosis around bandage which is covering incision (not seen at eval)   Posture Patient with forward head and flexed trunk position; R knee is in varus position   Side (if bilateral, select both right and left) Left   Left Knee Extension AROM -18   Left Knee Extension PROM -15   Left Knee Flexion AROM 70   Left Knee Flexion PROM 80   Left Knee Flexion Strength 3/5   Left Knee Extension Strength 3/5   Left Hip Abduction Strength 3/5   Planned Therapy Interventions   Planned Therapy Interventions gait training;joint mobilization;manual therapy;ROM;strengthening;stretching;neuromuscular re-education   Planned Modality Interventions   Planned Modality Interventions Cryotherapy;Electrical stimulation;Hot packs;TENS  (vasopneumatic compression)   Clinical Impression   Criteria for Skilled Therapeutic Interventions Met yes, treatment indicated   PT Diagnosis L knee pain   Influenced by the following impairments weakness, pain, impaired ROM, edema   Functional limitations due to impairments  impaired gait, impaired ADLs   Clinical Presentation Stable/Uncomplicated   Clinical Presentation Rationale Patient is s/p L TKA and post-op day 10. Patient presents with impaired L knee ROM, strength, edema, and pain affecting gait, ADLs and IADLs.   Clinical Decision Making (Complexity) Low complexity   Therapy Frequency 2 times/Week   Predicted Duration of Therapy Intervention (days/wks) 6 weeks   Risk & Benefits of therapy have been explained Yes   Patient, Family & other staff in agreement with plan of care Yes   Education Assessment   Preferred Learning Style Listening;Demonstration;Pictures/video   Barriers to Learning No barriers   ORTHO GOALS   PT Ortho Eval Goals 1;2;3   Ortho Goal 1   Goal Identifier ROM   Goal Description Patient will increase R knee ROM to -5-110 degrees for functional ROM within 8 weeks   Target Date 05/12/23   Ortho Goal 2   Goal Identifier strength   Goal Description Patient will increase knee flexion/extension strength to within 5/5 MMT to increased knee stability with gait and ADLs within 6 weeks   Target Date 05/12/23   Ortho Goal 3   Goal Identifier HEP   Goal Description Patient will be independent with HEP and and appropriate progressions within 6 weeks   Target Date 05/12/23   Total Evaluation Time   PT Esvin Low Complexity Minutes (83510) 30   Therapy Certification   Certification date from 03/31/23   Certification date to 06/23/23   Medical Diagnosis Z96.652 (ICD-10-CM) - Status post total left knee replacement

## 2023-03-31 NOTE — PROGRESS NOTES
[facility]    OUTPATIENT PHYSICAL THERAPY ORTHOPEDIC EVALUATION  PLAN OF TREATMENT FOR OUTPATIENT REHABILITATION  (COMPLETE FOR INITIAL CLAIMS ONLY)  Patient's Last Name, First Name, M.I.  YOB: 1950  Papo Gonzalez    Provider s Name:  [facility]   Medical Record No.  [id number]   Start of Care Date:  03/31/23   Onset Date:  03/21/23   Type:     _X__PT   ___OT   ___SLP Medical Diagnosis:  Z96.652 (ICD-10-CM) - Status post total left knee replacement     PT Diagnosis:  L knee pain   Visits from SOC:  1      _________________________________________________________________________________  Plan of Treatment/Functional Goals:  gait training, joint mobilization, manual therapy, ROM, strengthening, stretching, neuromuscular re-education     Cryotherapy, Electrical stimulation, Hot packs, TENS (vasopneumatic compression)     Goals  Goal Identifier: ROM  Goal Description: Patient will increase R knee ROM to -5-110 degrees for functional ROM within 8 weeks  Target Date: 05/12/23    Goal Identifier: strength  Goal Description: Patient will increase knee flexion/extension strength to within 5/5 MMT to increased knee stability with gait and ADLs within 6 weeks  Target Date: 05/12/23    Goal Identifier: HEP  Goal Description: Patient will be independent with HEP and and appropriate progressions within 6 weeks  Target Date: 05/12/23    Therapy Frequency:  2 times/Week  Predicted Duration of Therapy Intervention:  6 weeks    Roxy Treviño PT                 I CERTIFY THE NEED FOR THESE SERVICES FURNISHED UNDER        THIS PLAN OF TREATMENT AND WHILE UNDER MY CARE     (Physician co-signature of this document indicates review and certification of the therapy plan).                     Certification Date From:  03/31/23   Certification Date To:  06/23/23    Referring Provider:   MD Radha    Initial Assessment        See Epic Evaluation Start of Care Date: 03/31/23

## 2023-04-03 ENCOUNTER — HOSPITAL ENCOUNTER (OUTPATIENT)
Dept: PHYSICAL THERAPY | Facility: OTHER | Age: 73
Setting detail: THERAPIES SERIES
Discharge: HOME | End: 2023-04-03
Attending: ORTHOPAEDIC SURGERY
Payer: MEDICARE

## 2023-04-03 PROCEDURE — 97016 VASOPNEUMATIC DEVICE THERAPY: CPT | Mod: GP

## 2023-04-03 PROCEDURE — 97110 THERAPEUTIC EXERCISES: CPT | Mod: GP

## 2023-04-05 ENCOUNTER — OFFICE VISIT (OUTPATIENT)
Dept: ORTHOPEDICS | Facility: OTHER | Age: 73
End: 2023-04-05
Attending: ORTHOPAEDIC SURGERY
Payer: MEDICARE

## 2023-04-05 ENCOUNTER — HOSPITAL ENCOUNTER (OUTPATIENT)
Dept: PHYSICAL THERAPY | Facility: OTHER | Age: 73
Setting detail: THERAPIES SERIES
Discharge: HOME | End: 2023-04-05
Attending: ORTHOPAEDIC SURGERY
Payer: MEDICARE

## 2023-04-05 DIAGNOSIS — Z96.652 STATUS POST TOTAL LEFT KNEE REPLACEMENT: Primary | ICD-10-CM

## 2023-04-05 PROCEDURE — 99024 POSTOP FOLLOW-UP VISIT: CPT | Performed by: ORTHOPAEDIC SURGERY

## 2023-04-05 PROCEDURE — G0463 HOSPITAL OUTPT CLINIC VISIT: HCPCS

## 2023-04-05 PROCEDURE — 97110 THERAPEUTIC EXERCISES: CPT | Mod: GP,CQ

## 2023-04-05 PROCEDURE — 97016 VASOPNEUMATIC DEVICE THERAPY: CPT | Mod: GP,CQ

## 2023-04-05 NOTE — PROGRESS NOTES
Chief Complaint   Patient presents with     Surgical Followup     S/p 2 weeks left TKA       Clara Carreon LPN

## 2023-04-05 NOTE — PROGRESS NOTES
Visit Date: 2023    REASON FOR EVALUATION:  Left knee replacement.    HISTORY:  Yesenia comes back, left knee replacement.  He is overall doing fairly well.  He is getting around here.  Pain has been okay.  It is worse at nighttime.  He is using mostly just Tylenol and ibuprofen for that.    PHYSICAL EXAMINATION:  Left knee shows incisional site to be healing properly.  No signs or symptoms of infection present.  Staples removed.  Steri-Strips applied.  Range of motion is tolerable about -5 to 90.  Limb exam stable and balanced here otherwise.    IMPRESSION:  Left total knee replacement.    PLAN:  Continue to work on the therapy process.  No refills of pain medication required at this point.  See him back in a month.  X-rays, 3 views of the knee at that time.    Caleb Garcia MD        D: 2023   T: 2023   MT: [id number]    Name:     YESENIA FONG  MRN:      [id number]        Account:    [id number]   :      1950           Visit Date: 2023     Document: [id number]

## 2023-04-10 ENCOUNTER — HOSPITAL ENCOUNTER (OUTPATIENT)
Dept: PHYSICAL THERAPY | Facility: OTHER | Age: 73
Setting detail: THERAPIES SERIES
Discharge: HOME | End: 2023-04-10
Attending: ORTHOPAEDIC SURGERY
Payer: MEDICARE

## 2023-04-10 PROCEDURE — 97016 VASOPNEUMATIC DEVICE THERAPY: CPT | Mod: GP

## 2023-04-10 PROCEDURE — 97110 THERAPEUTIC EXERCISES: CPT | Mod: GP

## 2023-04-14 ENCOUNTER — HOSPITAL ENCOUNTER (OUTPATIENT)
Dept: PHYSICAL THERAPY | Facility: OTHER | Age: 73
Setting detail: THERAPIES SERIES
Discharge: HOME | End: 2023-04-14
Attending: ORTHOPAEDIC SURGERY
Payer: MEDICARE

## 2023-04-14 PROCEDURE — 97110 THERAPEUTIC EXERCISES: CPT | Mod: GP

## 2023-04-14 PROCEDURE — 97016 VASOPNEUMATIC DEVICE THERAPY: CPT | Mod: GP

## 2023-04-18 ENCOUNTER — HOSPITAL ENCOUNTER (OUTPATIENT)
Dept: PHYSICAL THERAPY | Facility: OTHER | Age: 73
Setting detail: THERAPIES SERIES
Discharge: HOME | End: 2023-04-18
Attending: ORTHOPAEDIC SURGERY
Payer: MEDICARE

## 2023-04-18 PROCEDURE — 97110 THERAPEUTIC EXERCISES: CPT | Mod: GP

## 2023-04-21 ENCOUNTER — HOSPITAL ENCOUNTER (OUTPATIENT)
Dept: PHYSICAL THERAPY | Facility: OTHER | Age: 73
Setting detail: THERAPIES SERIES
Discharge: HOME | End: 2023-04-21
Attending: ORTHOPAEDIC SURGERY
Payer: MEDICARE

## 2023-04-21 PROCEDURE — 97110 THERAPEUTIC EXERCISES: CPT | Mod: GP

## 2023-04-25 ENCOUNTER — HOSPITAL ENCOUNTER (OUTPATIENT)
Dept: PHYSICAL THERAPY | Facility: OTHER | Age: 73
Setting detail: THERAPIES SERIES
Discharge: HOME | End: 2023-04-25
Attending: ORTHOPAEDIC SURGERY
Payer: MEDICARE

## 2023-04-25 PROCEDURE — 97110 THERAPEUTIC EXERCISES: CPT | Mod: GP,CQ

## 2023-04-28 ENCOUNTER — HOSPITAL ENCOUNTER (OUTPATIENT)
Dept: PHYSICAL THERAPY | Facility: OTHER | Age: 73
Setting detail: THERAPIES SERIES
Discharge: HOME | End: 2023-04-28
Attending: ORTHOPAEDIC SURGERY
Payer: MEDICARE

## 2023-04-28 PROCEDURE — 97110 THERAPEUTIC EXERCISES: CPT | Mod: GP,CQ

## 2023-05-02 ENCOUNTER — HOSPITAL ENCOUNTER (OUTPATIENT)
Dept: PHYSICAL THERAPY | Facility: OTHER | Age: 73
Setting detail: THERAPIES SERIES
Discharge: HOME | End: 2023-05-02
Attending: ORTHOPAEDIC SURGERY
Payer: MEDICARE

## 2023-05-02 DIAGNOSIS — Z96.652 STATUS POST TOTAL LEFT KNEE REPLACEMENT: Primary | ICD-10-CM

## 2023-05-02 PROCEDURE — 97110 THERAPEUTIC EXERCISES: CPT | Mod: GP

## 2023-05-02 NOTE — PROGRESS NOTES
"[facility]    Outpatient Physical Therapy Progress Note  Patient: Papo Gonzalez  : 1950    Beginning/End Dates of Reporting Period:  3/31/2023 to 2023    Referring Provider: MD Radha    Therapy Diagnosis: s/p L TKA     Client Self Report: (P) Pt reports knee feels stiff the more he works it -- reports he is doing his exercises -- still unable to go down the stairs one step at a time. States he feels like the L leg will \"give\" if he tries to lead with his R. Of note patient continues to have impaired gait patterns but equally due to impaired ROM/strength on the R (future TKA)    Objective Measurements:  Objective Measure: Pain  Details: Rates pain at 2/10 - reports more stiff than pain  Objective Measure: ROM  Details: ROM 5-105 deg     Goals:  Goal Identifier ROM   Goal Description Patient will increase R knee ROM to 5-110 degrees for functional ROM within 8 weeks   Target Date 23   Date Met      Progress (detail required for progress note): R knee ROM 5-105     Goal Identifier strength   Goal Description Patient will increase knee flexion/extension strength to within 5/5 MMT to increased knee stability with gait and ADLs within 6 weeks   Target Date 23   Date Met      Progress (detail required for progress note): (P) 5/5 MMT knee flexion/extension     Goal Identifier HEP   Goal Description Patient will be independent with HEP and and appropriate progressions within 6 weeks   Target Date 23   Date Met  23   Progress (detail required for progress note): completes HEP 2x daily     Plan:  Continue therapy per current plan of care.    Discharge: No  "

## 2023-05-03 ENCOUNTER — OFFICE VISIT (OUTPATIENT)
Dept: ORTHOPEDICS | Facility: OTHER | Age: 73
End: 2023-05-03
Attending: ORTHOPAEDIC SURGERY
Payer: MEDICARE

## 2023-05-03 ENCOUNTER — HOSPITAL ENCOUNTER (OUTPATIENT)
Dept: GENERAL RADIOLOGY | Facility: OTHER | Age: 73
Discharge: HOME | End: 2023-05-03
Attending: ORTHOPAEDIC SURGERY
Payer: MEDICARE

## 2023-05-03 DIAGNOSIS — Z96.652 STATUS POST TOTAL LEFT KNEE REPLACEMENT: ICD-10-CM

## 2023-05-03 DIAGNOSIS — Z96.652 STATUS POST TOTAL LEFT KNEE REPLACEMENT: Primary | ICD-10-CM

## 2023-05-03 PROCEDURE — 99024 POSTOP FOLLOW-UP VISIT: CPT | Performed by: ORTHOPAEDIC SURGERY

## 2023-05-03 PROCEDURE — 73562 X-RAY EXAM OF KNEE 3: CPT | Mod: LT

## 2023-05-03 PROCEDURE — G0463 HOSPITAL OUTPT CLINIC VISIT: HCPCS

## 2023-05-03 NOTE — PROGRESS NOTES
Visit Date: 2023    REASON FOR EVALUATION:  Left knee replacement.    HISTORY:  Yesenia comes in, left knee replacement.  Overall, reports that he is doing fairly well.  Still working through his therapies.  Does feel like he is getting benefit from that.  Still some stiffness here, but it does seem to be trending in the right direction.    PHYSICAL EXAMINATION:  Examination of his knee shows motion about -3 to about 110.  Ligament exam stable and balanced.  Incisional site is healing.  Neurovascular examination intact.    IMAGING:  X-rays were reviewed, 3 views of the knee.  The patient's components are in stable alignment and position at this current time.    IMPRESSION:  Left total knee replacement.    PLAN:  Continue to work on therapy.  I think another month of work there certainly would make sense.  I will see him back in a month just for a simple rehab check at that time.    Caleb Garcia MD        D: 2023   T: 2023   MT: [email or web address]    Name:     YESENIA FONG  MRN:      [id number]        Account:    [id number]   :      1950           Visit Date: 2023     Document: [id number]

## 2023-05-03 NOTE — PROGRESS NOTES
Patient is here for follow up on his left total knee.  Frieda Corona LPN .....................5/3/2023 12:53 PM

## 2023-05-04 ENCOUNTER — HOSPITAL ENCOUNTER (OUTPATIENT)
Dept: PHYSICAL THERAPY | Facility: OTHER | Age: 73
Setting detail: THERAPIES SERIES
Discharge: HOME | End: 2023-05-04
Attending: ORTHOPAEDIC SURGERY
Payer: MEDICARE

## 2023-05-04 PROCEDURE — 97110 THERAPEUTIC EXERCISES: CPT | Mod: GP,CQ

## 2023-05-09 ENCOUNTER — HOSPITAL ENCOUNTER (OUTPATIENT)
Dept: PHYSICAL THERAPY | Facility: OTHER | Age: 73
Setting detail: THERAPIES SERIES
Discharge: HOME | End: 2023-05-09
Attending: ORTHOPAEDIC SURGERY
Payer: MEDICARE

## 2023-05-09 PROCEDURE — 97110 THERAPEUTIC EXERCISES: CPT | Mod: GP

## 2023-05-12 ENCOUNTER — HOSPITAL ENCOUNTER (OUTPATIENT)
Dept: PHYSICAL THERAPY | Facility: OTHER | Age: 73
Setting detail: THERAPIES SERIES
Discharge: HOME | End: 2023-05-12
Attending: ORTHOPAEDIC SURGERY
Payer: MEDICARE

## 2023-05-12 PROCEDURE — 97110 THERAPEUTIC EXERCISES: CPT | Mod: GP

## 2023-05-15 ENCOUNTER — HOSPITAL ENCOUNTER (OUTPATIENT)
Dept: PHYSICAL THERAPY | Facility: OTHER | Age: 73
Setting detail: THERAPIES SERIES
Discharge: HOME | End: 2023-05-15
Attending: ORTHOPAEDIC SURGERY
Payer: MEDICARE

## 2023-05-15 PROCEDURE — 97110 THERAPEUTIC EXERCISES: CPT | Mod: GP,CQ

## 2023-05-18 ENCOUNTER — THERAPY VISIT (OUTPATIENT)
Dept: PHYSICAL THERAPY | Facility: OTHER | Age: 73
End: 2023-05-18
Attending: ORTHOPAEDIC SURGERY
Payer: MEDICARE

## 2023-05-18 DIAGNOSIS — Z96.652 STATUS POST TOTAL LEFT KNEE REPLACEMENT: Primary | ICD-10-CM

## 2023-05-18 PROCEDURE — 97110 THERAPEUTIC EXERCISES: CPT | Mod: GP

## 2023-05-22 ENCOUNTER — THERAPY VISIT (OUTPATIENT)
Dept: PHYSICAL THERAPY | Facility: OTHER | Age: 73
End: 2023-05-22
Attending: ORTHOPAEDIC SURGERY
Payer: MEDICARE

## 2023-05-22 DIAGNOSIS — Z96.652 STATUS POST TOTAL LEFT KNEE REPLACEMENT: Primary | ICD-10-CM

## 2023-05-22 PROCEDURE — 97110 THERAPEUTIC EXERCISES: CPT | Mod: GP

## 2023-05-25 ENCOUNTER — THERAPY VISIT (OUTPATIENT)
Dept: PHYSICAL THERAPY | Facility: OTHER | Age: 73
End: 2023-05-25
Attending: ORTHOPAEDIC SURGERY
Payer: MEDICARE

## 2023-05-25 DIAGNOSIS — Z96.652 STATUS POST TOTAL LEFT KNEE REPLACEMENT: Primary | ICD-10-CM

## 2023-05-25 PROCEDURE — 97110 THERAPEUTIC EXERCISES: CPT | Mod: GP,CQ

## 2023-05-31 ENCOUNTER — THERAPY VISIT (OUTPATIENT)
Dept: PHYSICAL THERAPY | Facility: OTHER | Age: 73
End: 2023-05-31
Attending: ORTHOPAEDIC SURGERY
Payer: MEDICARE

## 2023-05-31 DIAGNOSIS — Z96.652 STATUS POST TOTAL LEFT KNEE REPLACEMENT: Primary | ICD-10-CM

## 2023-05-31 PROCEDURE — 97110 THERAPEUTIC EXERCISES: CPT | Mod: GP,CQ

## 2023-06-02 ENCOUNTER — THERAPY VISIT (OUTPATIENT)
Dept: PHYSICAL THERAPY | Facility: OTHER | Age: 73
End: 2023-06-02
Attending: ORTHOPAEDIC SURGERY
Payer: MEDICARE

## 2023-06-02 DIAGNOSIS — M17.0 PRIMARY OSTEOARTHRITIS OF BOTH KNEES: Primary | ICD-10-CM

## 2023-06-02 PROCEDURE — 97110 THERAPEUTIC EXERCISES: CPT | Mod: GP

## 2023-06-07 ENCOUNTER — OFFICE VISIT (OUTPATIENT)
Dept: ORTHOPEDICS | Facility: OTHER | Age: 73
End: 2023-06-07
Attending: ORTHOPAEDIC SURGERY
Payer: MEDICARE

## 2023-06-07 DIAGNOSIS — Z96.652 STATUS POST TOTAL LEFT KNEE REPLACEMENT: Primary | ICD-10-CM

## 2023-06-07 DIAGNOSIS — M25.561 CHRONIC PAIN OF RIGHT KNEE: ICD-10-CM

## 2023-06-07 DIAGNOSIS — G89.29 CHRONIC PAIN OF RIGHT KNEE: ICD-10-CM

## 2023-06-07 PROCEDURE — 99024 POSTOP FOLLOW-UP VISIT: CPT | Performed by: ORTHOPAEDIC SURGERY

## 2023-06-07 PROCEDURE — G0463 HOSPITAL OUTPT CLINIC VISIT: HCPCS

## 2023-06-07 NOTE — PROGRESS NOTES
Visit Date: 2023    REASON FOR EVALUATION:    1.  Left knee replacement.  2.  Right knee pain.    HISTORY:  The patient comes back in regard to left knee.  That is overall doing well, getting better and better.  He is 11 weeks out, and as far as his right knee, he is still having a lot of discomfort.  He has known significant arthrosis in the medial compartment.  At this point in time, he is interested in moving forward with some definitive surgical plan for that as well.    PHYSICAL EXAMINATION:  Left knee shows motion from about -3 to about 115.  Limb exam stable and balanced.  Right knee exam shows varus deformity, tenderness across the medial joint line, and crepitation with flexion and extension is noted there as well.    IMPRESSION:    1.  Left total knee replacement.  2.  Right knee pain, severe arthrosis.    PLAN:  Left knee, increase activities as comfortable.  I will see him back in 1-year checkup, 3 views of the knee at that point in time.  As far as the right knee is concerned, recommendation is that patient does get set forward on right total knee joint reconstruction here.  We will look at a fall timeframe for that.  All questions answered.  Qiana Persona posterior stabilized cemented in nature for the operation.  Surgery at [facility] as well.    Caleb Garcia MD        D: 2023   T: 2023   MT: [email or web address]    Name:     YESENIA FONG  MRN:      [id number]        Account:    [id number]   :      1950           Visit Date: 2023     Document: [id number]

## 2023-06-07 NOTE — PROGRESS NOTES
Patient is here for follow up on his left total knee.  Frieda Corona LPN .....................6/7/2023 10:11 AM

## 2023-06-12 DIAGNOSIS — R39.11 URINARY HESITANCY: ICD-10-CM

## 2023-06-15 RX ORDER — TAMSULOSIN HYDROCHLORIDE 0.4 MG/1
0.4 CAPSULE ORAL DAILY
Qty: 90 CAPSULE | Refills: 0 | Status: SHIPPED | OUTPATIENT
Start: 2023-06-15 | End: 2023-08-22

## 2023-06-15 NOTE — TELEPHONE ENCOUNTER
Patient is out of medication and requesting a refill. Last office visit 3/7/23. Refilling 90 day supply + 0 refills per CPA.     Jose Angel Ayala [facility]

## 2023-06-19 ENCOUNTER — TELEPHONE (OUTPATIENT)
Dept: ORTHOPEDICS | Facility: OTHER | Age: 73
End: 2023-06-19
Payer: MEDICARE

## 2023-06-19 DIAGNOSIS — M25.561 CHRONIC PAIN OF RIGHT KNEE: Primary | ICD-10-CM

## 2023-06-19 DIAGNOSIS — G89.29 CHRONIC PAIN OF RIGHT KNEE: Primary | ICD-10-CM

## 2023-08-22 ENCOUNTER — OFFICE VISIT (OUTPATIENT)
Dept: FAMILY MEDICINE | Facility: OTHER | Age: 73
End: 2023-08-22
Attending: PHYSICIAN ASSISTANT
Payer: MEDICARE

## 2023-08-22 VITALS
TEMPERATURE: 97.3 F | BODY MASS INDEX: 36.91 KG/M2 | WEIGHT: 235.2 LBS | HEART RATE: 89 BPM | HEIGHT: 67 IN | OXYGEN SATURATION: 97 % | RESPIRATION RATE: 16 BRPM | SYSTOLIC BLOOD PRESSURE: 128 MMHG | DIASTOLIC BLOOD PRESSURE: 80 MMHG

## 2023-08-22 DIAGNOSIS — R39.11 URINARY HESITANCY: ICD-10-CM

## 2023-08-22 DIAGNOSIS — Z00.00 ENCOUNTER FOR MEDICAL EXAMINATION TO ESTABLISH CARE: ICD-10-CM

## 2023-08-22 DIAGNOSIS — M17.11 PRIMARY OSTEOARTHRITIS OF RIGHT KNEE: ICD-10-CM

## 2023-08-22 DIAGNOSIS — Z01.818 PREOP GENERAL PHYSICAL EXAM: Primary | ICD-10-CM

## 2023-08-22 LAB
ANION GAP SERPL CALCULATED.3IONS-SCNC: 9 MMOL/L (ref 7–15)
BASOPHILS # BLD AUTO: 0 10E3/UL (ref 0–0.2)
BASOPHILS NFR BLD AUTO: 1 %
BUN SERPL-MCNC: 13.8 MG/DL (ref 8–23)
CALCIUM SERPL-MCNC: 9.2 MG/DL (ref 8.8–10.2)
CHLORIDE SERPL-SCNC: 106 MMOL/L (ref 98–107)
CREAT SERPL-MCNC: 1.1 MG/DL (ref 0.67–1.17)
DEPRECATED HCO3 PLAS-SCNC: 25 MMOL/L (ref 22–29)
EOSINOPHIL # BLD AUTO: 0.2 10E3/UL (ref 0–0.7)
EOSINOPHIL NFR BLD AUTO: 3 %
ERYTHROCYTE [DISTWIDTH] IN BLOOD BY AUTOMATED COUNT: 13 % (ref 10–15)
GFR SERPL CREATININE-BSD FRML MDRD: 72 ML/MIN/1.73M2
GLUCOSE SERPL-MCNC: 115 MG/DL (ref 70–99)
HCT VFR BLD AUTO: 45.2 % (ref 40–53)
HGB BLD-MCNC: 15.4 G/DL (ref 13.3–17.7)
IMM GRANULOCYTES # BLD: 0 10E3/UL
IMM GRANULOCYTES NFR BLD: 0 %
LYMPHOCYTES # BLD AUTO: 2.3 10E3/UL (ref 0.8–5.3)
LYMPHOCYTES NFR BLD AUTO: 31 %
MCH RBC QN AUTO: 31.5 PG (ref 26.5–33)
MCHC RBC AUTO-ENTMCNC: 34.1 G/DL (ref 31.5–36.5)
MCV RBC AUTO: 92 FL (ref 78–100)
MONOCYTES # BLD AUTO: 0.6 10E3/UL (ref 0–1.3)
MONOCYTES NFR BLD AUTO: 8 %
NEUTROPHILS # BLD AUTO: 4.2 10E3/UL (ref 1.6–8.3)
NEUTROPHILS NFR BLD AUTO: 57 %
NRBC # BLD AUTO: 0 10E3/UL
NRBC BLD AUTO-RTO: 0 /100
PLATELET # BLD AUTO: 210 10E3/UL (ref 150–450)
POTASSIUM SERPL-SCNC: 4.1 MMOL/L (ref 3.4–5.3)
RBC # BLD AUTO: 4.89 10E6/UL (ref 4.4–5.9)
SODIUM SERPL-SCNC: 140 MMOL/L (ref 136–145)
WBC # BLD AUTO: 7.4 10E3/UL (ref 4–11)

## 2023-08-22 PROCEDURE — 99214 OFFICE O/P EST MOD 30 MIN: CPT | Performed by: PHYSICIAN ASSISTANT

## 2023-08-22 PROCEDURE — 93010 ELECTROCARDIOGRAM REPORT: CPT | Performed by: INTERNAL MEDICINE

## 2023-08-22 PROCEDURE — 85025 COMPLETE CBC W/AUTO DIFF WBC: CPT | Mod: ZL | Performed by: PHYSICIAN ASSISTANT

## 2023-08-22 PROCEDURE — G0463 HOSPITAL OUTPT CLINIC VISIT: HCPCS

## 2023-08-22 PROCEDURE — 82310 ASSAY OF CALCIUM: CPT | Mod: ZL | Performed by: PHYSICIAN ASSISTANT

## 2023-08-22 PROCEDURE — 93005 ELECTROCARDIOGRAM TRACING: CPT | Performed by: PHYSICIAN ASSISTANT

## 2023-08-22 PROCEDURE — 36415 COLL VENOUS BLD VENIPUNCTURE: CPT | Mod: ZL | Performed by: PHYSICIAN ASSISTANT

## 2023-08-22 RX ORDER — TAMSULOSIN HYDROCHLORIDE 0.4 MG/1
0.4 CAPSULE ORAL DAILY
Qty: 90 CAPSULE | Refills: 3 | Status: SHIPPED | OUTPATIENT
Start: 2023-08-22 | End: 2024-06-18

## 2023-08-22 ASSESSMENT — PAIN SCALES - GENERAL: PAINLEVEL: MILD PAIN (3)

## 2023-08-22 NOTE — NURSING NOTE
"Patient presents to the clinic for pre-op exam    FOOD SECURITY SCREENING QUESTIONS:    The next two questions are to help us understand your food security.  If you are feeling you need any assistance in this area, we have resources available to support you today.    Hunger Vital Signs:  Within the past 12 months we worried whether our food would run out before we got money to buy more. Never  Within the past 12 months the food we bought just didn't last and we didn't have money to get more. Never    Advance Care Directive on file? No  Advance Care Directive provided to patient? Declined     Chief Complaint   Patient presents with    Pre-Op Exam       Initial BP (!) 148/98 (BP Location: Right arm, Patient Position: Sitting, Cuff Size: Adult Regular)   Pulse 89   Temp 97.3  F (36.3  C) (Temporal)   Resp 16   Ht 1.702 m (5' 7\")   Wt 106.7 kg (235 lb 3.2 oz)   SpO2 97%   BMI 36.84 kg/m   Estimated body mass index is 36.84 kg/m  as calculated from the following:    Height as of this encounter: 1.702 m (5' 7\").    Weight as of this encounter: 106.7 kg (235 lb 3.2 oz).  Medication Reconciliation: complete        Emi Pike LPN on 8/22/2023 at 11:10 AM    "

## 2023-08-22 NOTE — PATIENT INSTRUCTIONS
For informational purposes only. Not to replace the advice of your health care provider. Copyright   2003,  [place] [employer] [facility]. All rights reserved. Clinically reviewed by Chen James MD. [employer] [id number] - REV .  Preparing for Your Surgery  Getting started  A nurse will call you to review your health history and instructions. They will give you an arrival time based on your scheduled surgery time. Please be ready to share:  Your doctor's clinic name and phone number  Your medical, surgical, and anesthesia history  A list of allergies and sensitivities  A list of medicines, including herbal treatments and over-the-counter drugs  Whether the patient has a legal guardian (ask how to send us the papers in advance)  Please tell us if you're pregnant--or if there's any chance you might be pregnant. Some surgeries may injure a fetus (unborn baby), so they require a pregnancy test. Surgeries that are safe for a fetus don't always need a test, and you can choose whether to have one.   If you have a child who's having surgery, please ask for a copy of Preparing for Your Child's Surgery.    Preparing for surgery  Within 10 to 30 days of surgery: Have a pre-op exam (sometimes called an H&P, or History and Physical). This can be done at a clinic or pre-operative center.  If you're having a , you may not need this exam. Talk to your care team.  At your pre-op exam, talk to your care team about all medicines you take. If you need to stop any medicines before surgery, ask when to start taking them again.  We do this for your safety. Many medicines can make you bleed too much during surgery. Some change how well surgery (anesthesia) drugs work.  Call your insurance company to let them know you're having surgery. (If you don't have insurance, call [phone].)  Call your clinic if there's any change in your health. This includes signs of a cold or flu (sore throat, runny nose, cough, rash, fever). It also  includes a scrape or scratch near the surgery site.  If you have questions on the day of surgery, call your hospital or surgery center.  Eating and drinking guidelines  For your safety: Unless your surgeon tells you otherwise, follow the guidelines below.  Eat and drink as usual until 8 hours before you arrive for surgery. After that, no food or milk.  Drink clear liquids until 2 hours before you arrive. These are liquids you can see through, like water, Gatorade, and Propel Water. They also include plain black coffee and tea (no cream or milk), candy, and breath mints. You can spit out gum when you arrive.  If you drink alcohol: Stop drinking it the night before surgery.  If your care team tells you to take medicine on the morning of surgery, it's okay to take it with a sip of water.  Preventing infection  Shower or bathe the night before and morning of your surgery. Follow the instructions your clinic gave you. (If no instructions, use regular soap.)  Don't shave or clip hair near your surgery site. We'll remove the hair if needed.  Don't smoke or vape the morning of surgery. You may chew nicotine gum up to 2 hours before surgery. A nicotine patch is okay.  Note: Some surgeries require you to completely quit smoking and nicotine. Check with your surgeon.  Your care team will make every effort to keep you safe from infection. We will:  Clean our hands often with soap and water (or an alcohol-based hand rub).  Clean the skin at your surgery site with a special soap that kills germs.  Give you a special gown to keep you warm. (Cold raises the risk of infection.)  Wear special hair covers, masks, gowns and gloves during surgery.  Give antibiotic medicine, if prescribed. Not all surgeries need antibiotics.  What to bring on the day of surgery  Photo ID and insurance card  Copy of your health care directive, if you have one  Glasses and hearing aids (bring cases)  You can't wear contacts during surgery  Inhaler and eye  drops, if you use them (tell us about these when you arrive)  CPAP machine or breathing device, if you use them  A few personal items, if spending the night  If you have . . .  A pacemaker, ICD (cardiac defibrillator) or other implant: Bring the ID card.  An implanted stimulator: Bring the remote control.  A legal guardian: Bring a copy of the certified (court-stamped) guardianship papers.  Please remove any jewelry, including body piercings. Leave jewelry and other valuables at home.  If you're going home the day of surgery  You must have a responsible adult drive you home. They should stay with you overnight as well.  If you don't have someone to stay with you, and you aren't safe to go home alone, we may keep you overnight. Insurance often won't pay for this.  After surgery  If it's hard to control your pain or you need more pain medicine, please call your surgeon's office.  Questions?   If you have any questions for your care team, list them here: _________________________________________________________________________________________________________________________________________________________________________ ____________________________________ ____________________________________ ____________________________________    How to Take Your Medication Before Surgery  - HOLD (do not take) NSAIDs (Ibuprofen, Aleve, Naproxen) for 7 days prior to surgery  - STOP taking all vitamins and herbal supplements 14 days before surgery.

## 2023-08-22 NOTE — H&P (VIEW-ONLY)
[facility] AND \[facility]\""  [address]  [place] [address]  Phone: [phone]  Fax: [phone]  Primary Provider: Zack Roa  Pre-op Performing Provider: ROSA ISELA PRITCHETT    PREOPERATIVE EVALUATION:  Today's date: 8/22/2023    Papo Gonzalez is a 73 year old male who presents for a preoperative evaluation.      8/22/2023    11:07 AM   Additional Questions   Roomed by Emi MITESH   Accompanied by Self     Surgical Information:  Surgery/Procedure: Right Total Knee Arthroplasty  Surgery Location: [facility]  Surgeon: Dr. Caleb Garcia  Surgery Date: 9/5/23  Time of Surgery: TBD  Where patient plans to recover: At home with family  Fax number for surgical facility: Note does not need to be faxed, will be available electronically in Epic.    Assessment & Plan     The proposed surgical procedure is considered INTERMEDIATE risk.    1. Preop general physical exam  - CBC and Differential  - Basic Metabolic Panel  - EKG 12-lead, tracing only (Same Day)  -Vital signs are stable, blood pressure 128/80, pulse of 89, afebrile temperature of 97.3  F.  Examination is available for review below, no acute findings.  EKG was updated today, normal sinus rhythm with a ventricular rate of 80. CBC - hemoglobin 15.4, hematocrit 45.2. BMP - GFR 72, creatinine 1.10. Patient is in agreement and understanding of the above treatment plan. All questions and concerns were addressed and answered to patient's satisfaction. AVS reviewed with patient.     2. Primary osteoarthritis of right knee  -Rationale for the upcoming procedure, he underwent a left TKA earlier this spring and did quite well.  He has no questions or concerns, he is aware of risk, benefits and potential complications of surgery.  He has surgical soap at home.    3. Urinary hesitancy  - tamsulosin (FLOMAX) 0.4 MG capsule; Take 1 capsule (0.4 mg) by mouth daily  Dispense: 90 capsule; Refill: 3  - Refill of Flomax, this has been helpful you for urinary urgency  and hesitancy.  This will hopefully also prevent need for catheter after joint replacement.    4. Encounter for medical examination to establish care  - Encounter to establish care completed today, patient's previous PCP is transitioning to another role within the practice. This was updated in patient's chart today.      - No identified additional risk factors other than previously addressed    Antiplatelet or Anticoagulation Medication Instructions:   - Patient is on no antiplatelet or anticoagulation medications.    Additional Medication Instructions:  Patient is to take all scheduled medications on the day of surgery  - Hold NSAIDs x 7 days prior to surgery  - Hold any vitamins/supplements for 10-14 days prior to surgery    RECOMMENDATION:  APPROVAL GIVEN to proceed with proposed procedure, without further diagnostic evaluation.    Subjective     HPI related to upcoming procedure: Papo presents today for preoperative evaluation prior to upcoming proposed right total knee arthroplasty. He underwent a left TKA 3/21/23 and did well with post operative course. No acute health changes. No current or recent illness.         8/17/2023     8:47 AM   Preop Questions   1. Have you ever had a heart attack or stroke? No   2. Have you ever had surgery on your heart or blood vessels, such as a stent placement, a coronary artery bypass, or surgery on an artery in your head, neck, heart, or legs? No   3. Do you have chest pain with activity? No   4. Do you have a history of  heart failure? No   5. Do you currently have a cold, bronchitis or symptoms of other infection? No   6. Do you have a cough, shortness of breath, or wheezing? No   7. Do you or anyone in your family have previous history of blood clots? No   8. Do you or does anyone in your family have a serious bleeding problem such as prolonged bleeding following surgeries or cuts? No   9. Have you ever had problems with anemia or been told to take iron pills? No   10.  Have you had any abnormal blood loss such as black, tarry or bloody stools? No   11. Have you ever had a blood transfusion? No   12. Are you willing to have a blood transfusion if it is medically needed before, during, or after your surgery? Yes   13. Have you or any of your relatives ever had problems with anesthesia? No   14. Do you have sleep apnea, excessive snoring or daytime drowsiness? No   15. Do you have any artifical heart valves or other implanted medical devices like a pacemaker, defibrillator, or continuous glucose monitor? No   16. Do you have artificial joints? YES - left knee   17. Are you allergic to latex? No       Health Care Directive:  Patient does not have a Health Care Directive or Living Will: Patient states has Advance Directive and will bring in a copy to clinic.    Preoperative Review of :   reviewed - no record of controlled substances prescribed.    Status of Chronic Conditions:  See problem list for active medical problems.  Problems all longstanding and stable, except as noted/documented.  See ROS for pertinent symptoms related to these conditions.    Review of Systems  CONSTITUTIONAL: NEGATIVE for fever, chills, change in weight  INTEGUMENTARY/SKIN: NEGATIVE for worrisome rashes, moles or lesions  EYES: NEGATIVE for vision changes or irritation  ENT/MOUTH: NEGATIVE for ear, mouth and throat problems  RESP: NEGATIVE for significant cough or SOB  CV: NEGATIVE for chest pain, palpitations or peripheral edema  GI: NEGATIVE for nausea, abdominal pain, heartburn, or change in bowel habits  : NEGATIVE for frequency, dysuria, or hematuria  MUSCULOSKELETAL: NEGATIVE for significant arthralgias or myalgia  NEURO: NEGATIVE for weakness, dizziness or paresthesias  ENDOCRINE: NEGATIVE for temperature intolerance, skin/hair changes  HEME: NEGATIVE for bleeding problems  PSYCHIATRIC: NEGATIVE for changes in mood or affect    Patient Active Problem List    Diagnosis Date Noted    Primary  "osteoarthritis of both knees 03/21/2023     Priority: Medium    Status post total left knee replacement 03/21/2023     Priority: Medium    Prediabetes 03/10/2023     Priority: Medium    H/O adenomatous polyp of colon 12/14/2022     Priority: Medium    H/O basal cell carcinoma excision 12/14/2022     Priority: Medium      History reviewed. No pertinent past medical history.  Past Surgical History:   Procedure Laterality Date    ARTHROPLASTY KNEE Left 3/21/2023    Procedure: ARTHROPLASTY, KNEE, TOTAL;  Surgeon: Caleb Garcia MD;  Location: [facility] OR    COLONOSCOPY  07/29/2016    tubular adenomas, repeat 2021     Current Outpatient Medications   Medication Sig Dispense Refill    tamsulosin (FLOMAX) 0.4 MG capsule Take 1 capsule (0.4 mg) by mouth daily 90 capsule 3     No Known Allergies     Social History     Tobacco Use    Smoking status: Never     Passive exposure: Never    Smokeless tobacco: Never   Substance Use Topics    Alcohol use: Yes     Comment: 1 beer per week     Family History   Problem Relation Age of Onset    Dementia Mother     Prostate Cancer Father     Diabetes Sister     Prostate Cancer Paternal Uncle     Heart Disease No family hx of      History   Drug Use Unknown         Objective     /80   Pulse 89   Temp 97.3  F (36.3  C) (Temporal)   Resp 16   Ht 1.702 m (5' 7\")   Wt 106.7 kg (235 lb 3.2 oz)   SpO2 97%   BMI 36.84 kg/m       Physical Exam    GENERAL APPEARANCE: healthy, alert and no distress     EYES: EOMI,  PERRL     HENT: ear canals and TM's normal and nose and mouth without ulcers or lesions     NECK: no adenopathy, no asymmetry, masses, or scars and thyroid normal to palpation     RESP: lungs clear to auscultation - no rales, rhonchi or wheezes     CV: regular rates and rhythm, normal S1 S2, no S3 or S4 and no murmur, click or rub     ABDOMEN:  soft, nontender, no HSM or masses and bowel sounds normal     MS: extremities normal- no gross deformities noted, no evidence of " inflammation in joints, FROM in all extremities.     SKIN: no suspicious lesions or rashes     NEURO: Normal strength and tone, sensory exam grossly normal, mentation intact and speech normal     PSYCH: mentation appears normal. and affect normal/bright     LYMPHATICS: No cervical adenopathy    Recent Labs   Lab Test 03/22/23  0554 03/07/23  1448 12/06/22  1129   HGB 13.8 16.9  --    PLT  --  239  --    NA  --   --  138   POTASSIUM  --   --  4.3   CR  --   --  1.13   A1C  --   --  5.9      Diagnostics:  Recent Results (from the past 24 hour(s))   EKG 12-lead, tracing only (Same Day)    Collection Time: 08/22/23 11:21 AM   Result Value Ref Range    Systolic Blood Pressure  mmHg    Diastolic Blood Pressure  mmHg    Ventricular Rate 80 BPM    Atrial Rate 80 BPM    SC Interval 140 ms    QRS Duration 88 ms     ms    QTc 401 ms    P Axis 34 degrees    R AXIS -3 degrees    T Axis 102 degrees    Interpretation ECG       Sinus rhythm  Nonspecific T wave abnormality  Abnormal ECG  When compared with ECG of 07-MAR-2023 14:23,  No significant change was found     Basic Metabolic Panel    Collection Time: 08/22/23 11:33 AM   Result Value Ref Range    Sodium 140 136 - 145 mmol/L    Potassium 4.1 3.4 - 5.3 mmol/L    Chloride 106 98 - 107 mmol/L    Carbon Dioxide (CO2) 25 22 - 29 mmol/L    Anion Gap 9 7 - 15 mmol/L    Urea Nitrogen 13.8 8.0 - 23.0 mg/dL    Creatinine 1.10 0.67 - 1.17 mg/dL    Calcium 9.2 8.8 - 10.2 mg/dL    Glucose 115 (H) 70 - 99 mg/dL    GFR Estimate 72 >60 mL/min/1.73m2   CBC with platelets and differential    Collection Time: 08/22/23 11:33 AM   Result Value Ref Range    WBC Count 7.4 4.0 - 11.0 10e3/uL    RBC Count 4.89 4.40 - 5.90 10e6/uL    Hemoglobin 15.4 13.3 - 17.7 g/dL    Hematocrit 45.2 40.0 - 53.0 %    MCV 92 78 - 100 fL    MCH 31.5 26.5 - 33.0 pg    MCHC 34.1 31.5 - 36.5 g/dL    RDW 13.0 10.0 - 15.0 %    Platelet Count 210 150 - 450 10e3/uL    % Neutrophils 57 %    % Lymphocytes 31 %    %  Monocytes 8 %    % Eosinophils 3 %    % Basophils 1 %    % Immature Granulocytes 0 %    NRBCs per 100 WBC 0 <1 /100    Absolute Neutrophils 4.2 1.6 - 8.3 10e3/uL    Absolute Lymphocytes 2.3 0.8 - 5.3 10e3/uL    Absolute Monocytes 0.6 0.0 - 1.3 10e3/uL    Absolute Eosinophils 0.2 0.0 - 0.7 10e3/uL    Absolute Basophils 0.0 0.0 - 0.2 10e3/uL    Absolute Immature Granulocytes 0.0 <=0.4 10e3/uL    Absolute NRBCs 0.0 10e3/uL      EKG: appears normal, NSR, normal axis, normal intervals, no acute ST/T changes c/w ischemia, no LVH by voltage criteria, unchanged from previous tracings    Revised Cardiac Risk Index (RCRI):  The patient has the following serious cardiovascular risks for perioperative complications:   - No serious cardiac risks = 0 points   RCRI Interpretation: 0 points: Class I (very low risk - 0.4% complication rate)    Signed Electronically by: Donya Chavarria PA-C  Copy of this evaluation report is provided to requesting physician.

## 2023-08-22 NOTE — PROGRESS NOTES
[facility] AND [facility]  [address]  [place] [address]  Phone: [phone]  Fax: [phone]  Primary Provider: Zakc Roa  Pre-op Performing Provider: ROSA ISELA PRITCHETT    PREOPERATIVE EVALUATION:  Today's date: 8/22/2023    Papo Gonzalez is a 73 year old male who presents for a preoperative evaluation.      8/22/2023    11:07 AM   Additional Questions   Roomed by Mei MITESH   Accompanied by Self     Surgical Information:  Surgery/Procedure: Right Total Knee Arthroplasty  Surgery Location: [facility]  Surgeon: Dr. Caleb Garcia  Surgery Date: 9/5/23  Time of Surgery: TBD  Where patient plans to recover: At home with family  Fax number for surgical facility: Note does not need to be faxed, will be available electronically in Epic.    Assessment & Plan     The proposed surgical procedure is considered INTERMEDIATE risk.    1. Preop general physical exam  - CBC and Differential  - Basic Metabolic Panel  - EKG 12-lead, tracing only (Same Day)  -Vital signs are stable, blood pressure 128/80, pulse of 89, afebrile temperature of 97.3  F.  Examination is available for review below, no acute findings.  EKG was updated today, normal sinus rhythm with a ventricular rate of 80. CBC - hemoglobin 15.4, hematocrit 45.2. BMP - GFR 72, creatinine 1.10. Patient is in agreement and understanding of the above treatment plan. All questions and concerns were addressed and answered to patient's satisfaction. AVS reviewed with patient.     2. Primary osteoarthritis of right knee  -Rationale for the upcoming procedure, he underwent a left TKA earlier this spring and did quite well.  He has no questions or concerns, he is aware of risk, benefits and potential complications of surgery.  He has surgical soap at home.    3. Urinary hesitancy  - tamsulosin (FLOMAX) 0.4 MG capsule; Take 1 capsule (0.4 mg) by mouth daily  Dispense: 90 capsule; Refill: 3  - Refill of Flomax, this has been helpful you for urinary urgency  and hesitancy.  This will hopefully also prevent need for catheter after joint replacement.    4. Encounter for medical examination to establish care  - Encounter to establish care completed today, patient's previous PCP is transitioning to another role within the practice. This was updated in patient's chart today.      - No identified additional risk factors other than previously addressed    Antiplatelet or Anticoagulation Medication Instructions:   - Patient is on no antiplatelet or anticoagulation medications.    Additional Medication Instructions:  Patient is to take all scheduled medications on the day of surgery  - Hold NSAIDs x 7 days prior to surgery  - Hold any vitamins/supplements for 10-14 days prior to surgery    RECOMMENDATION:  APPROVAL GIVEN to proceed with proposed procedure, without further diagnostic evaluation.    Subjective     HPI related to upcoming procedure: Papo presents today for preoperative evaluation prior to upcoming proposed right total knee arthroplasty. He underwent a left TKA 3/21/23 and did well with post operative course. No acute health changes. No current or recent illness.         8/17/2023     8:47 AM   Preop Questions   1. Have you ever had a heart attack or stroke? No   2. Have you ever had surgery on your heart or blood vessels, such as a stent placement, a coronary artery bypass, or surgery on an artery in your head, neck, heart, or legs? No   3. Do you have chest pain with activity? No   4. Do you have a history of  heart failure? No   5. Do you currently have a cold, bronchitis or symptoms of other infection? No   6. Do you have a cough, shortness of breath, or wheezing? No   7. Do you or anyone in your family have previous history of blood clots? No   8. Do you or does anyone in your family have a serious bleeding problem such as prolonged bleeding following surgeries or cuts? No   9. Have you ever had problems with anemia or been told to take iron pills? No   10.  Have you had any abnormal blood loss such as black, tarry or bloody stools? No   11. Have you ever had a blood transfusion? No   12. Are you willing to have a blood transfusion if it is medically needed before, during, or after your surgery? Yes   13. Have you or any of your relatives ever had problems with anesthesia? No   14. Do you have sleep apnea, excessive snoring or daytime drowsiness? No   15. Do you have any artifical heart valves or other implanted medical devices like a pacemaker, defibrillator, or continuous glucose monitor? No   16. Do you have artificial joints? YES - left knee   17. Are you allergic to latex? No       Health Care Directive:  Patient does not have a Health Care Directive or Living Will: Patient states has Advance Directive and will bring in a copy to clinic.    Preoperative Review of :   reviewed - no record of controlled substances prescribed.    Status of Chronic Conditions:  See problem list for active medical problems.  Problems all longstanding and stable, except as noted/documented.  See ROS for pertinent symptoms related to these conditions.    Review of Systems  CONSTITUTIONAL: NEGATIVE for fever, chills, change in weight  INTEGUMENTARY/SKIN: NEGATIVE for worrisome rashes, moles or lesions  EYES: NEGATIVE for vision changes or irritation  ENT/MOUTH: NEGATIVE for ear, mouth and throat problems  RESP: NEGATIVE for significant cough or SOB  CV: NEGATIVE for chest pain, palpitations or peripheral edema  GI: NEGATIVE for nausea, abdominal pain, heartburn, or change in bowel habits  : NEGATIVE for frequency, dysuria, or hematuria  MUSCULOSKELETAL: NEGATIVE for significant arthralgias or myalgia  NEURO: NEGATIVE for weakness, dizziness or paresthesias  ENDOCRINE: NEGATIVE for temperature intolerance, skin/hair changes  HEME: NEGATIVE for bleeding problems  PSYCHIATRIC: NEGATIVE for changes in mood or affect    Patient Active Problem List    Diagnosis Date Noted    Primary  "osteoarthritis of both knees 03/21/2023     Priority: Medium    Status post total left knee replacement 03/21/2023     Priority: Medium    Prediabetes 03/10/2023     Priority: Medium    H/O adenomatous polyp of colon 12/14/2022     Priority: Medium    H/O basal cell carcinoma excision 12/14/2022     Priority: Medium      History reviewed. No pertinent past medical history.  Past Surgical History:   Procedure Laterality Date    ARTHROPLASTY KNEE Left 3/21/2023    Procedure: ARTHROPLASTY, KNEE, TOTAL;  Surgeon: Caleb Garcia MD;  Location: [facility] OR    COLONOSCOPY  07/29/2016    tubular adenomas, repeat 2021     Current Outpatient Medications   Medication Sig Dispense Refill    tamsulosin (FLOMAX) 0.4 MG capsule Take 1 capsule (0.4 mg) by mouth daily 90 capsule 3     No Known Allergies     Social History     Tobacco Use    Smoking status: Never     Passive exposure: Never    Smokeless tobacco: Never   Substance Use Topics    Alcohol use: Yes     Comment: 1 beer per week     Family History   Problem Relation Age of Onset    Dementia Mother     Prostate Cancer Father     Diabetes Sister     Prostate Cancer Paternal Uncle     Heart Disease No family hx of      History   Drug Use Unknown         Objective     /80   Pulse 89   Temp 97.3  F (36.3  C) (Temporal)   Resp 16   Ht 1.702 m (5' 7\")   Wt 106.7 kg (235 lb 3.2 oz)   SpO2 97%   BMI 36.84 kg/m       Physical Exam    GENERAL APPEARANCE: healthy, alert and no distress     EYES: EOMI,  PERRL     HENT: ear canals and TM's normal and nose and mouth without ulcers or lesions     NECK: no adenopathy, no asymmetry, masses, or scars and thyroid normal to palpation     RESP: lungs clear to auscultation - no rales, rhonchi or wheezes     CV: regular rates and rhythm, normal S1 S2, no S3 or S4 and no murmur, click or rub     ABDOMEN:  soft, nontender, no HSM or masses and bowel sounds normal     MS: extremities normal- no gross deformities noted, no evidence of " inflammation in joints, FROM in all extremities.     SKIN: no suspicious lesions or rashes     NEURO: Normal strength and tone, sensory exam grossly normal, mentation intact and speech normal     PSYCH: mentation appears normal. and affect normal/bright     LYMPHATICS: No cervical adenopathy    Recent Labs   Lab Test 03/22/23  0554 03/07/23  1448 12/06/22  1129   HGB 13.8 16.9  --    PLT  --  239  --    NA  --   --  138   POTASSIUM  --   --  4.3   CR  --   --  1.13   A1C  --   --  5.9      Diagnostics:  Recent Results (from the past 24 hour(s))   EKG 12-lead, tracing only (Same Day)    Collection Time: 08/22/23 11:21 AM   Result Value Ref Range    Systolic Blood Pressure  mmHg    Diastolic Blood Pressure  mmHg    Ventricular Rate 80 BPM    Atrial Rate 80 BPM    VT Interval 140 ms    QRS Duration 88 ms     ms    QTc 401 ms    P Axis 34 degrees    R AXIS -3 degrees    T Axis 102 degrees    Interpretation ECG       Sinus rhythm  Nonspecific T wave abnormality  Abnormal ECG  When compared with ECG of 07-MAR-2023 14:23,  No significant change was found     Basic Metabolic Panel    Collection Time: 08/22/23 11:33 AM   Result Value Ref Range    Sodium 140 136 - 145 mmol/L    Potassium 4.1 3.4 - 5.3 mmol/L    Chloride 106 98 - 107 mmol/L    Carbon Dioxide (CO2) 25 22 - 29 mmol/L    Anion Gap 9 7 - 15 mmol/L    Urea Nitrogen 13.8 8.0 - 23.0 mg/dL    Creatinine 1.10 0.67 - 1.17 mg/dL    Calcium 9.2 8.8 - 10.2 mg/dL    Glucose 115 (H) 70 - 99 mg/dL    GFR Estimate 72 >60 mL/min/1.73m2   CBC with platelets and differential    Collection Time: 08/22/23 11:33 AM   Result Value Ref Range    WBC Count 7.4 4.0 - 11.0 10e3/uL    RBC Count 4.89 4.40 - 5.90 10e6/uL    Hemoglobin 15.4 13.3 - 17.7 g/dL    Hematocrit 45.2 40.0 - 53.0 %    MCV 92 78 - 100 fL    MCH 31.5 26.5 - 33.0 pg    MCHC 34.1 31.5 - 36.5 g/dL    RDW 13.0 10.0 - 15.0 %    Platelet Count 210 150 - 450 10e3/uL    % Neutrophils 57 %    % Lymphocytes 31 %    %  Monocytes 8 %    % Eosinophils 3 %    % Basophils 1 %    % Immature Granulocytes 0 %    NRBCs per 100 WBC 0 <1 /100    Absolute Neutrophils 4.2 1.6 - 8.3 10e3/uL    Absolute Lymphocytes 2.3 0.8 - 5.3 10e3/uL    Absolute Monocytes 0.6 0.0 - 1.3 10e3/uL    Absolute Eosinophils 0.2 0.0 - 0.7 10e3/uL    Absolute Basophils 0.0 0.0 - 0.2 10e3/uL    Absolute Immature Granulocytes 0.0 <=0.4 10e3/uL    Absolute NRBCs 0.0 10e3/uL      EKG: appears normal, NSR, normal axis, normal intervals, no acute ST/T changes c/w ischemia, no LVH by voltage criteria, unchanged from previous tracings    Revised Cardiac Risk Index (RCRI):  The patient has the following serious cardiovascular risks for perioperative complications:   - No serious cardiac risks = 0 points   RCRI Interpretation: 0 points: Class I (very low risk - 0.4% complication rate)    Signed Electronically by: oDnya Chavarria PA-C  Copy of this evaluation report is provided to requesting physician.

## 2023-08-23 LAB
ATRIAL RATE - MUSE: 80 BPM
DIASTOLIC BLOOD PRESSURE - MUSE: NORMAL MMHG
INTERPRETATION ECG - MUSE: NORMAL
P AXIS - MUSE: 34 DEGREES
PR INTERVAL - MUSE: 140 MS
QRS DURATION - MUSE: 88 MS
QT - MUSE: 348 MS
QTC - MUSE: 401 MS
R AXIS - MUSE: -3 DEGREES
SYSTOLIC BLOOD PRESSURE - MUSE: NORMAL MMHG
T AXIS - MUSE: 102 DEGREES
VENTRICULAR RATE- MUSE: 80 BPM

## 2023-09-01 ENCOUNTER — ANESTHESIA EVENT (OUTPATIENT)
Dept: SURGERY | Facility: OTHER | Age: 73
End: 2023-09-01
Payer: MEDICARE

## 2023-09-05 ENCOUNTER — ANESTHESIA (OUTPATIENT)
Dept: SURGERY | Facility: OTHER | Age: 73
End: 2023-09-05
Payer: MEDICARE

## 2023-09-05 ENCOUNTER — APPOINTMENT (OUTPATIENT)
Dept: OCCUPATIONAL THERAPY | Facility: OTHER | Age: 73
End: 2023-09-05
Attending: ORTHOPAEDIC SURGERY
Payer: MEDICARE

## 2023-09-05 ENCOUNTER — HOSPITAL ENCOUNTER (OUTPATIENT)
Facility: OTHER | Age: 73
Discharge: HOME | End: 2023-09-06
Attending: ORTHOPAEDIC SURGERY | Admitting: ORTHOPAEDIC SURGERY
Payer: MEDICARE

## 2023-09-05 ENCOUNTER — APPOINTMENT (OUTPATIENT)
Dept: GENERAL RADIOLOGY | Facility: OTHER | Age: 73
End: 2023-09-05
Attending: ORTHOPAEDIC SURGERY
Payer: MEDICARE

## 2023-09-05 ENCOUNTER — APPOINTMENT (OUTPATIENT)
Dept: PHYSICAL THERAPY | Facility: OTHER | Age: 73
End: 2023-09-05
Attending: ORTHOPAEDIC SURGERY
Payer: MEDICARE

## 2023-09-05 DIAGNOSIS — G89.29 CHRONIC PAIN OF RIGHT KNEE: Primary | ICD-10-CM

## 2023-09-05 DIAGNOSIS — M25.561 CHRONIC PAIN OF RIGHT KNEE: Primary | ICD-10-CM

## 2023-09-05 DIAGNOSIS — Z96.651 S/P TOTAL KNEE ARTHROPLASTY, RIGHT: ICD-10-CM

## 2023-09-05 LAB
GLUCOSE BLDC GLUCOMTR-MCNC: 118 MG/DL (ref 70–99)
GLUCOSE BLDC GLUCOMTR-MCNC: 167 MG/DL (ref 70–99)

## 2023-09-05 PROCEDURE — 710N000010 HC RECOVERY PHASE 1, LEVEL 2, PER MIN: Performed by: ORTHOPAEDIC SURGERY

## 2023-09-05 PROCEDURE — 272N000001 HC OR GENERAL SUPPLY STERILE: Performed by: ORTHOPAEDIC SURGERY

## 2023-09-05 PROCEDURE — 96375 TX/PRO/DX INJ NEW DRUG ADDON: CPT | Mod: XU

## 2023-09-05 PROCEDURE — 27447 TOTAL KNEE ARTHROPLASTY: CPT | Mod: RT | Performed by: ORTHOPAEDIC SURGERY

## 2023-09-05 PROCEDURE — 370N000017 HC ANESTHESIA TECHNICAL FEE, PER MIN: Performed by: ORTHOPAEDIC SURGERY

## 2023-09-05 PROCEDURE — 999N000141 HC STATISTIC PRE-PROCEDURE NURSING ASSESSMENT: Performed by: ORTHOPAEDIC SURGERY

## 2023-09-05 PROCEDURE — C1713 ANCHOR/SCREW BN/BN,TIS/BN: HCPCS | Performed by: ORTHOPAEDIC SURGERY

## 2023-09-05 PROCEDURE — 27447 TOTAL KNEE ARTHROPLASTY: CPT | Performed by: NURSE ANESTHETIST, CERTIFIED REGISTERED

## 2023-09-05 PROCEDURE — 250N000011 HC RX IP 250 OP 636: Performed by: ORTHOPAEDIC SURGERY

## 2023-09-05 PROCEDURE — 99213 OFFICE O/P EST LOW 20 MIN: CPT | Mod: 25 | Performed by: INTERNAL MEDICINE

## 2023-09-05 PROCEDURE — 250N000009 HC RX 250: Performed by: NURSE ANESTHETIST, CERTIFIED REGISTERED

## 2023-09-05 PROCEDURE — 258N000003 HC RX IP 258 OP 636: Performed by: NURSE ANESTHETIST, CERTIFIED REGISTERED

## 2023-09-05 PROCEDURE — 97530 THERAPEUTIC ACTIVITIES: CPT | Mod: GP

## 2023-09-05 PROCEDURE — C1776 JOINT DEVICE (IMPLANTABLE): HCPCS | Performed by: ORTHOPAEDIC SURGERY

## 2023-09-05 PROCEDURE — 97530 THERAPEUTIC ACTIVITIES: CPT | Mod: GO | Performed by: OCCUPATIONAL THERAPIST

## 2023-09-05 PROCEDURE — 82962 GLUCOSE BLOOD TEST: CPT

## 2023-09-05 PROCEDURE — 250N000013 HC RX MED GY IP 250 OP 250 PS 637: Performed by: ORTHOPAEDIC SURGERY

## 2023-09-05 PROCEDURE — 999N000065 XR KNEE PORT RIGHT 1/2 VIEWS: Mod: RT

## 2023-09-05 PROCEDURE — 250N000011 HC RX IP 250 OP 636: Performed by: NURSE ANESTHETIST, CERTIFIED REGISTERED

## 2023-09-05 PROCEDURE — 258N000001 HC RX 258: Performed by: ORTHOPAEDIC SURGERY

## 2023-09-05 PROCEDURE — 97161 PT EVAL LOW COMPLEX 20 MIN: CPT | Mod: GP

## 2023-09-05 PROCEDURE — 360N000077 HC SURGERY LEVEL 4, PER MIN: Performed by: ORTHOPAEDIC SURGERY

## 2023-09-05 PROCEDURE — 97165 OT EVAL LOW COMPLEX 30 MIN: CPT | Mod: GO | Performed by: OCCUPATIONAL THERAPIST

## 2023-09-05 PROCEDURE — 96374 THER/PROPH/DIAG INJ IV PUSH: CPT | Mod: XU

## 2023-09-05 PROCEDURE — 64450 NJX AA&/STRD OTHER PN/BRANCH: CPT | Mod: RT | Performed by: NURSE ANESTHETIST, CERTIFIED REGISTERED

## 2023-09-05 PROCEDURE — 64447 NJX AA&/STRD FEMORAL NRV IMG: CPT | Mod: RT | Performed by: NURSE ANESTHETIST, CERTIFIED REGISTERED

## 2023-09-05 PROCEDURE — 250N000011 HC RX IP 250 OP 636: Mod: JZ | Performed by: NURSE ANESTHETIST, CERTIFIED REGISTERED

## 2023-09-05 PROCEDURE — 99100 ANES PT EXTEME AGE<1 YR&>70: CPT | Performed by: NURSE ANESTHETIST, CERTIFIED REGISTERED

## 2023-09-05 DEVICE — FULL DOSE BONE CEMENT, 10 PACK CATALOG NUMBER IS 6191-1-010
Type: IMPLANTABLE DEVICE | Site: KNEE | Status: FUNCTIONAL
Brand: SIMPLEX

## 2023-09-05 DEVICE — IMPLANTABLE DEVICE
Type: IMPLANTABLE DEVICE | Site: KNEE | Status: FUNCTIONAL
Brand: PERSONA®

## 2023-09-05 DEVICE — SCREW TEMP PSN ZIM FEMALE 2.5MM 42-5099-025-25: Type: IMPLANTABLE DEVICE | Site: KNEE | Status: FUNCTIONAL

## 2023-09-05 DEVICE — IMPLANTABLE DEVICE
Type: IMPLANTABLE DEVICE | Site: KNEE | Status: FUNCTIONAL
Brand: PERSONA® VIVACIT-E®

## 2023-09-05 DEVICE — IMPLANTABLE DEVICE
Type: IMPLANTABLE DEVICE | Site: KNEE | Status: FUNCTIONAL
Brand: PERSONA® NATURAL TIBIA®

## 2023-09-05 RX ORDER — NALOXONE HYDROCHLORIDE 0.4 MG/ML
0.2 INJECTION, SOLUTION INTRAMUSCULAR; INTRAVENOUS; SUBCUTANEOUS
Status: DISCONTINUED | OUTPATIENT
Start: 2023-09-05 | End: 2023-09-06 | Stop reason: HOSPADM

## 2023-09-05 RX ORDER — PROPOFOL 10 MG/ML
INJECTION, EMULSION INTRAVENOUS CONTINUOUS PRN
Status: DISCONTINUED | OUTPATIENT
Start: 2023-09-05 | End: 2023-09-05

## 2023-09-05 RX ORDER — FENTANYL CITRATE 50 UG/ML
50 INJECTION, SOLUTION INTRAMUSCULAR; INTRAVENOUS EVERY 5 MIN PRN
Status: DISCONTINUED | OUTPATIENT
Start: 2023-09-05 | End: 2023-09-05 | Stop reason: HOSPADM

## 2023-09-05 RX ORDER — NALOXONE HYDROCHLORIDE 0.4 MG/ML
0.4 INJECTION, SOLUTION INTRAMUSCULAR; INTRAVENOUS; SUBCUTANEOUS
Status: DISCONTINUED | OUTPATIENT
Start: 2023-09-05 | End: 2023-09-06 | Stop reason: HOSPADM

## 2023-09-05 RX ORDER — PROPOFOL 10 MG/ML
INJECTION, EMULSION INTRAVENOUS PRN
Status: DISCONTINUED | OUTPATIENT
Start: 2023-09-05 | End: 2023-09-05

## 2023-09-05 RX ORDER — TAMSULOSIN HYDROCHLORIDE 0.4 MG/1
0.4 CAPSULE ORAL AT BEDTIME
Status: DISCONTINUED | OUTPATIENT
Start: 2023-09-05 | End: 2023-09-06 | Stop reason: HOSPADM

## 2023-09-05 RX ORDER — BUPIVACAINE HYDROCHLORIDE 5 MG/ML
INJECTION, SOLUTION EPIDURAL; INTRACAUDAL
Status: COMPLETED | OUTPATIENT
Start: 2023-09-05 | End: 2023-09-05

## 2023-09-05 RX ORDER — NALOXONE HYDROCHLORIDE 0.4 MG/ML
0.4 INJECTION, SOLUTION INTRAMUSCULAR; INTRAVENOUS; SUBCUTANEOUS
Status: DISCONTINUED | OUTPATIENT
Start: 2023-09-05 | End: 2023-09-05 | Stop reason: HOSPADM

## 2023-09-05 RX ORDER — NALOXONE HYDROCHLORIDE 0.4 MG/ML
0.2 INJECTION, SOLUTION INTRAMUSCULAR; INTRAVENOUS; SUBCUTANEOUS
Status: DISCONTINUED | OUTPATIENT
Start: 2023-09-05 | End: 2023-09-05 | Stop reason: HOSPADM

## 2023-09-05 RX ORDER — HYDROMORPHONE HCL IN WATER/PF 6 MG/30 ML
0.4 PATIENT CONTROLLED ANALGESIA SYRINGE INTRAVENOUS EVERY 5 MIN PRN
Status: DISCONTINUED | OUTPATIENT
Start: 2023-09-05 | End: 2023-09-05 | Stop reason: HOSPADM

## 2023-09-05 RX ORDER — ACETAMINOPHEN 325 MG/1
975 TABLET ORAL EVERY 8 HOURS
Status: DISCONTINUED | OUTPATIENT
Start: 2023-09-05 | End: 2023-09-06 | Stop reason: HOSPADM

## 2023-09-05 RX ORDER — CEFAZOLIN SODIUM/WATER 2 G/20 ML
2 SYRINGE (ML) INTRAVENOUS
Status: COMPLETED | OUTPATIENT
Start: 2023-09-05 | End: 2023-09-05

## 2023-09-05 RX ORDER — ONDANSETRON 2 MG/ML
4 INJECTION INTRAMUSCULAR; INTRAVENOUS EVERY 30 MIN PRN
Status: DISCONTINUED | OUTPATIENT
Start: 2023-09-05 | End: 2023-09-05 | Stop reason: HOSPADM

## 2023-09-05 RX ORDER — FENTANYL CITRATE 50 UG/ML
25 INJECTION, SOLUTION INTRAMUSCULAR; INTRAVENOUS EVERY 5 MIN PRN
Status: DISCONTINUED | OUTPATIENT
Start: 2023-09-05 | End: 2023-09-05 | Stop reason: HOSPADM

## 2023-09-05 RX ORDER — PROCHLORPERAZINE MALEATE 5 MG
5 TABLET ORAL EVERY 6 HOURS PRN
Status: DISCONTINUED | OUTPATIENT
Start: 2023-09-05 | End: 2023-09-06 | Stop reason: HOSPADM

## 2023-09-05 RX ORDER — FLUMAZENIL 0.1 MG/ML
0.2 INJECTION, SOLUTION INTRAVENOUS
Status: DISCONTINUED | OUTPATIENT
Start: 2023-09-05 | End: 2023-09-05 | Stop reason: HOSPADM

## 2023-09-05 RX ORDER — AMOXICILLIN 250 MG
1-2 CAPSULE ORAL 2 TIMES DAILY
Qty: 30 TABLET | Refills: 0 | Status: SHIPPED | OUTPATIENT
Start: 2023-09-05 | End: 2024-02-06

## 2023-09-05 RX ORDER — ACETAMINOPHEN 325 MG/1
650 TABLET ORAL EVERY 4 HOURS PRN
Qty: 100 TABLET | Refills: 0 | Status: SHIPPED | OUTPATIENT
Start: 2023-09-05 | End: 2024-02-06

## 2023-09-05 RX ORDER — ASPIRIN 81 MG/1
81 TABLET ORAL 2 TIMES DAILY
Status: DISCONTINUED | OUTPATIENT
Start: 2023-09-06 | End: 2023-09-06 | Stop reason: HOSPADM

## 2023-09-05 RX ORDER — KETOROLAC TROMETHAMINE 15 MG/ML
15 INJECTION, SOLUTION INTRAMUSCULAR; INTRAVENOUS EVERY 6 HOURS
Status: COMPLETED | OUTPATIENT
Start: 2023-09-05 | End: 2023-09-06

## 2023-09-05 RX ORDER — ACETAMINOPHEN 325 MG/1
650 TABLET ORAL EVERY 4 HOURS PRN
Status: DISCONTINUED | OUTPATIENT
Start: 2023-09-08 | End: 2023-09-06 | Stop reason: HOSPADM

## 2023-09-05 RX ORDER — ACETAMINOPHEN 325 MG/1
975 TABLET ORAL ONCE
Status: COMPLETED | OUTPATIENT
Start: 2023-09-05 | End: 2023-09-05

## 2023-09-05 RX ORDER — CEFAZOLIN SODIUM/WATER 2 G/20 ML
2 SYRINGE (ML) INTRAVENOUS EVERY 8 HOURS
Status: COMPLETED | OUTPATIENT
Start: 2023-09-05 | End: 2023-09-06

## 2023-09-05 RX ORDER — TRANEXAMIC ACID 650 MG/1
1950 TABLET ORAL ONCE
Status: COMPLETED | OUTPATIENT
Start: 2023-09-05 | End: 2023-09-05

## 2023-09-05 RX ORDER — SODIUM CHLORIDE, SODIUM LACTATE, POTASSIUM CHLORIDE, CALCIUM CHLORIDE 600; 310; 30; 20 MG/100ML; MG/100ML; MG/100ML; MG/100ML
INJECTION, SOLUTION INTRAVENOUS CONTINUOUS
Status: DISCONTINUED | OUTPATIENT
Start: 2023-09-05 | End: 2023-09-05 | Stop reason: HOSPADM

## 2023-09-05 RX ORDER — LIDOCAINE 40 MG/G
CREAM TOPICAL
Status: DISCONTINUED | OUTPATIENT
Start: 2023-09-05 | End: 2023-09-06 | Stop reason: HOSPADM

## 2023-09-05 RX ORDER — OXYCODONE HYDROCHLORIDE 5 MG/1
5 TABLET ORAL EVERY 4 HOURS PRN
Status: DISCONTINUED | OUTPATIENT
Start: 2023-09-05 | End: 2023-09-06 | Stop reason: HOSPADM

## 2023-09-05 RX ORDER — BISACODYL 10 MG
10 SUPPOSITORY, RECTAL RECTAL DAILY PRN
Status: DISCONTINUED | OUTPATIENT
Start: 2023-09-05 | End: 2023-09-06 | Stop reason: HOSPADM

## 2023-09-05 RX ORDER — HYDROMORPHONE HCL IN WATER/PF 6 MG/30 ML
0.4 PATIENT CONTROLLED ANALGESIA SYRINGE INTRAVENOUS
Status: DISCONTINUED | OUTPATIENT
Start: 2023-09-05 | End: 2023-09-06 | Stop reason: HOSPADM

## 2023-09-05 RX ORDER — LIDOCAINE HYDROCHLORIDE 20 MG/ML
INJECTION, SOLUTION INFILTRATION; PERINEURAL PRN
Status: DISCONTINUED | OUTPATIENT
Start: 2023-09-05 | End: 2023-09-05

## 2023-09-05 RX ORDER — LIDOCAINE 40 MG/G
CREAM TOPICAL
Status: DISCONTINUED | OUTPATIENT
Start: 2023-09-05 | End: 2023-09-05 | Stop reason: HOSPADM

## 2023-09-05 RX ORDER — IBUPROFEN 600 MG/1
600 TABLET, FILM COATED ORAL EVERY 6 HOURS PRN
Qty: 30 TABLET | Refills: 0 | Status: SHIPPED | OUTPATIENT
Start: 2023-09-05 | End: 2024-02-06

## 2023-09-05 RX ORDER — FENTANYL CITRATE 50 UG/ML
25-50 INJECTION, SOLUTION INTRAMUSCULAR; INTRAVENOUS
Status: DISCONTINUED | OUTPATIENT
Start: 2023-09-05 | End: 2023-09-05 | Stop reason: HOSPADM

## 2023-09-05 RX ORDER — HYDROMORPHONE HCL IN WATER/PF 6 MG/30 ML
0.2 PATIENT CONTROLLED ANALGESIA SYRINGE INTRAVENOUS EVERY 5 MIN PRN
Status: DISCONTINUED | OUTPATIENT
Start: 2023-09-05 | End: 2023-09-05 | Stop reason: HOSPADM

## 2023-09-05 RX ORDER — ONDANSETRON 4 MG/1
4 TABLET, ORALLY DISINTEGRATING ORAL EVERY 30 MIN PRN
Status: DISCONTINUED | OUTPATIENT
Start: 2023-09-05 | End: 2023-09-05 | Stop reason: HOSPADM

## 2023-09-05 RX ORDER — ONDANSETRON 2 MG/ML
INJECTION INTRAMUSCULAR; INTRAVENOUS PRN
Status: DISCONTINUED | OUTPATIENT
Start: 2023-09-05 | End: 2023-09-05

## 2023-09-05 RX ORDER — ASPIRIN 81 MG/1
81 TABLET ORAL 2 TIMES DAILY
Qty: 60 TABLET | Refills: 0 | Status: SHIPPED | OUTPATIENT
Start: 2023-09-05 | End: 2024-02-06

## 2023-09-05 RX ORDER — OXYCODONE HYDROCHLORIDE 5 MG/1
10 TABLET ORAL EVERY 4 HOURS PRN
Status: DISCONTINUED | OUTPATIENT
Start: 2023-09-05 | End: 2023-09-06 | Stop reason: HOSPADM

## 2023-09-05 RX ORDER — CEFAZOLIN SODIUM/WATER 2 G/20 ML
2 SYRINGE (ML) INTRAVENOUS SEE ADMIN INSTRUCTIONS
Status: DISCONTINUED | OUTPATIENT
Start: 2023-09-05 | End: 2023-09-05 | Stop reason: HOSPADM

## 2023-09-05 RX ORDER — DEXAMETHASONE SODIUM PHOSPHATE 10 MG/ML
INJECTION, SOLUTION INTRAMUSCULAR; INTRAVENOUS
Status: COMPLETED | OUTPATIENT
Start: 2023-09-05 | End: 2023-09-05

## 2023-09-05 RX ORDER — AMOXICILLIN 250 MG
1 CAPSULE ORAL 2 TIMES DAILY
Status: DISCONTINUED | OUTPATIENT
Start: 2023-09-05 | End: 2023-09-06 | Stop reason: HOSPADM

## 2023-09-05 RX ORDER — HYDROMORPHONE HCL IN WATER/PF 6 MG/30 ML
0.2 PATIENT CONTROLLED ANALGESIA SYRINGE INTRAVENOUS
Status: DISCONTINUED | OUTPATIENT
Start: 2023-09-05 | End: 2023-09-06 | Stop reason: HOSPADM

## 2023-09-05 RX ORDER — ONDANSETRON 2 MG/ML
4 INJECTION INTRAMUSCULAR; INTRAVENOUS EVERY 6 HOURS PRN
Status: DISCONTINUED | OUTPATIENT
Start: 2023-09-05 | End: 2023-09-06 | Stop reason: HOSPADM

## 2023-09-05 RX ORDER — POLYETHYLENE GLYCOL 3350 17 G/17G
17 POWDER, FOR SOLUTION ORAL DAILY
Status: DISCONTINUED | OUTPATIENT
Start: 2023-09-06 | End: 2023-09-06 | Stop reason: HOSPADM

## 2023-09-05 RX ORDER — SODIUM CHLORIDE 9 MG/ML
INJECTION, SOLUTION INTRAVENOUS CONTINUOUS
Status: DISCONTINUED | OUTPATIENT
Start: 2023-09-05 | End: 2023-09-06 | Stop reason: HOSPADM

## 2023-09-05 RX ORDER — ONDANSETRON 4 MG/1
4 TABLET, ORALLY DISINTEGRATING ORAL EVERY 6 HOURS PRN
Status: DISCONTINUED | OUTPATIENT
Start: 2023-09-05 | End: 2023-09-06 | Stop reason: HOSPADM

## 2023-09-05 RX ADMIN — ACETAMINOPHEN 975 MG: 325 TABLET, FILM COATED ORAL at 09:43

## 2023-09-05 RX ADMIN — SODIUM CHLORIDE, SODIUM LACTATE, POTASSIUM CHLORIDE, AND CALCIUM CHLORIDE: 600; 310; 30; 20 INJECTION, SOLUTION INTRAVENOUS at 11:44

## 2023-09-05 RX ADMIN — Medication 2 G: at 18:11

## 2023-09-05 RX ADMIN — ACETAMINOPHEN 975 MG: 325 TABLET, FILM COATED ORAL at 15:44

## 2023-09-05 RX ADMIN — MIDAZOLAM HYDROCHLORIDE 2 MG: 1 INJECTION, SOLUTION INTRAMUSCULAR; INTRAVENOUS at 10:00

## 2023-09-05 RX ADMIN — PROPOFOL 50 MG: 10 INJECTION, EMULSION INTRAVENOUS at 10:57

## 2023-09-05 RX ADMIN — DEXAMETHASONE SODIUM PHOSPHATE 5 MG: 10 INJECTION, SOLUTION INTRAMUSCULAR; INTRAVENOUS at 10:04

## 2023-09-05 RX ADMIN — KETOROLAC TROMETHAMINE 15 MG: 15 INJECTION, SOLUTION INTRAMUSCULAR; INTRAVENOUS at 19:32

## 2023-09-05 RX ADMIN — DEXAMETHASONE SODIUM PHOSPHATE 5 MG: 10 INJECTION, SOLUTION INTRAMUSCULAR; INTRAVENOUS at 10:09

## 2023-09-05 RX ADMIN — KETOROLAC TROMETHAMINE 15 MG: 15 INJECTION, SOLUTION INTRAMUSCULAR; INTRAVENOUS at 13:58

## 2023-09-05 RX ADMIN — SODIUM CHLORIDE, SODIUM LACTATE, POTASSIUM CHLORIDE, AND CALCIUM CHLORIDE: 600; 310; 30; 20 INJECTION, SOLUTION INTRAVENOUS at 09:43

## 2023-09-05 RX ADMIN — LIDOCAINE HYDROCHLORIDE 40 MG: 20 INJECTION, SOLUTION INFILTRATION; PERINEURAL at 10:57

## 2023-09-05 RX ADMIN — MEPIVACAINE HYDROCHLORIDE 2.5 ML: 20 INJECTION, SOLUTION INFILTRATION at 10:52

## 2023-09-05 RX ADMIN — ONDANSETRON HYDROCHLORIDE 4 MG: 2 SOLUTION INTRAMUSCULAR; INTRAVENOUS at 10:57

## 2023-09-05 RX ADMIN — SENNOSIDES AND DOCUSATE SODIUM 1 TABLET: 50; 8.6 TABLET ORAL at 15:08

## 2023-09-05 RX ADMIN — BUPIVACAINE HYDROCHLORIDE 15 ML: 5 INJECTION, SOLUTION EPIDURAL; INTRACAUDAL at 10:09

## 2023-09-05 RX ADMIN — PROPOFOL 75 MCG/KG/MIN: 10 INJECTION, EMULSION INTRAVENOUS at 10:57

## 2023-09-05 RX ADMIN — Medication 2 G: at 10:09

## 2023-09-05 RX ADMIN — MIDAZOLAM HYDROCHLORIDE 2 MG: 1 INJECTION, SOLUTION INTRAMUSCULAR; INTRAVENOUS at 10:40

## 2023-09-05 RX ADMIN — TAMSULOSIN HYDROCHLORIDE 0.4 MG: 0.4 CAPSULE ORAL at 21:38

## 2023-09-05 RX ADMIN — TRANEXAMIC ACID 1950 MG: 650 TABLET ORAL at 09:45

## 2023-09-05 RX ADMIN — BUPIVACAINE HYDROCHLORIDE 15 ML: 5 INJECTION, SOLUTION EPIDURAL; INTRACAUDAL; PERINEURAL at 10:04

## 2023-09-05 ASSESSMENT — ACTIVITIES OF DAILY LIVING (ADL)
DRESSING/BATHING_DIFFICULTY: NO
DIFFICULTY_EATING/SWALLOWING: NO
CHANGE_IN_FUNCTIONAL_STATUS_SINCE_ONSET_OF_CURRENT_ILLNESS/INJURY: NO
ADLS_ACUITY_SCORE: 22
TRANSFERRING: 0-->INDEPENDENT
DOING_ERRANDS_INDEPENDENTLY_DIFFICULTY: YES
ADLS_ACUITY_SCORE: 22
ADLS_ACUITY_SCORE: 25
TRANSFERRING: 0-->INDEPENDENT
WEAR_GLASSES_OR_BLIND: YES
CONCENTRATING,_REMEMBERING_OR_MAKING_DECISIONS_DIFFICULTY: NO
DIFFICULTY_COMMUNICATING: NO
ADLS_ACUITY_SCORE: 33
FALL_HISTORY_WITHIN_LAST_SIX_MONTHS: NO
ADLS_ACUITY_SCORE: 24
HEARING_DIFFICULTY_OR_DEAF: NO
WALKING_OR_CLIMBING_STAIRS_DIFFICULTY: NO
ADLS_ACUITY_SCORE: 33
ADLS_ACUITY_SCORE: 22

## 2023-09-05 NOTE — PROGRESS NOTES
Pt is sitting in chair in room visiting with his wife. He denies pain when asked.  He has voided without difficulty.

## 2023-09-05 NOTE — ANESTHESIA PROCEDURE NOTES
Adductor canal Procedure Note    Pre-Procedure   Staff -        CRNA: Terese Lozada APRN CRNA       Performed By: CRNA       Location: pre-op       Procedure Start/Stop Times: 9/5/2023 10:05 AM and 9/5/2023 10:09 AM       Pre-Anesthestic Checklist: patient identified, IV checked, site marked, risks and benefits discussed, informed consent, monitors and equipment checked, pre-op evaluation, at physician/surgeon's request and post-op pain management  Timeout:       Correct Patient: Yes        Correct Procedure: Yes        Correct Site: Yes        Correct Position: Yes        Correct Laterality: Yes        Site Marked: Yes  Procedure Documentation  Procedure: Adductor canal       Diagnosis: POST OPERATIVE PAIN CONTROL       Laterality: right       Patient Position: supine       Patient Prep/Sterile Barriers: sterile gloves, mask       Skin prep: Chloraprep       Local skin infiltrated with 0.5 mL of. Local skin infiltration: 0.5% Bupivacaine.       Needle Type: insulated and short bevel       Needle Gauge: 20.        Needle Length (Inches): 6        Ultrasound guided       1. Ultrasound was used to identify targeted nerve, plexus, vascular marker, or fascial plane and place a needle adjacent to it in real-time.       2. Ultrasound was used to visualize the spread of anesthetic in close proximity to the above referenced structure.       3. A permanent image is entered into the patient's record.       4. The visualized anatomic structures appeared normal.       5. There were no apparent abnormal pathologic findings.    Assessment/Narrative         The placement was negative for: blood aspirated, painful injection and site bleeding       Paresthesias: No.       Bolus given via needle..        Secured via.        Insertion/Infusion Method: Single Shot       Complications: none    Medication(s) Administered   Bupivacaine 0.5% PF (Infiltration) - Infiltration   15 mL - 9/5/2023 10:09:00 AM  Dexamethasone 10 mg/mL PF  "(Perineural) - Perineural   5 mg - 9/5/2023 10:09:00 AM  Medication Administration Time: 9/5/2023 10:05 AM      FOR [facility] ([facility]) ONLY:   Pain Team Contact information: please page the Pain Team Via [employer]. Search \"Pain\". During daytime hours, please page the attending first. At night please page the resident first.      "

## 2023-09-05 NOTE — PHARMACY-ADMISSION MEDICATION HISTORY
Pharmacist Admission Medication History    Admission medication history is complete. The information provided in this note is only as accurate as the sources available at the time of the update.    Medication reconciliation/reorder completed by provider prior to medication history? Yes    Information Source(s): Patient via in-person, [employer]    Pertinent Information: Patient denies to use of other Rx or any OTC meds    Changes made to PTA medication list:  Added: None  Deleted: None  Changed: None    Medication Affordability:  Not including over the counter (OTC) medications, was there a time in the past 3 months when you did not take your medications as prescribed because of cost?: No    Allergies reviewed with patient and updates made in EHR: yes- NKDA    Medication History Completed By: Olivia Smith RPH 9/5/2023 1:53 PM    Prior to Admission medications    Medication Sig Last Dose Taking? Auth Provider Long Term End Date   tamsulosin (FLOMAX) 0.4 MG capsule Take 1 capsule (0.4 mg) by mouth daily 9/4/2023 at PM Yes Donya Chavarria PA-C

## 2023-09-05 NOTE — BRIEF OP NOTE
[facility]    Brief Operative Note    Pre-operative diagnosis: Chronic pain of right knee [M25.561, G89.29]  Post-operative diagnosis Same as pre-operative diagnosis    Procedure: Procedure(s):  ARTHROPLASTY, KNEE, TOTAL  Surgeon: Surgeon(s) and Role:     * Caleb Garcia MD - Primary     * Jr Cano PA-C - Assisting  Anesthesia: Spinal with Block   Estimated Blood Loss: Less than 50 ml    Drains: None  Specimens: * No specimens in log *  Findings:   None.  Complications: None.  Implants:   Implant Name Type Inv. Item Serial No.  Lot No. LRB No. Used Action   IMP SCR ZIM PSN FEMALE 2.5MM -429-25 - [id number] Metallic Hardware/Glennie IMP SCR ZIM PSN FEMALE 2.5MM -490-25  [employer]  Right 1 Used as a Supply   BONE CEMENT SIMPLEX FULL DOSE 6191-1-001 - [id number] Cement, Bone BONE CEMENT SIMPLEX FULL DOSE 6191-1-001  [employer] [id number] Right 2 Implanted   KNEE FEMUR PS CEMENT CC STD SZ 10 R -979-02 - [id number] Total Joint Component/Insert KNEE FEMUR PS CEMENT CC STD SZ 10 R -417-02  [employer] [id number] Right 1 Implanted   IMP COMP PATELLA ZIM POST STAB VIVACIT-E 35MM [id number] - [id number] Total Joint Component/Insert IMP COMP PATELLA ZIM POST STAB VIVACIT-E 35MM [id number]  [employer] [id number] Right 1 Implanted   IMP TIBIAL ZIM PSN NP STM 5DEG  GR -739-02 - [id number] Total Joint Component/Insert IMP TIBIAL ZIM PSN NP STM 5DEG  GR -936-02  [employer] [id number] Right 1 Implanted   Persona Articular surface    [employer] [id number] Right 1 Implanted

## 2023-09-05 NOTE — ANESTHESIA PROCEDURE NOTES
IPACK Procedure Note    Pre-Procedure   Staff -        CRNA: Terese Lozada APRN CRNA       Performed By: CRNA       Location: pre-op       Procedure Start/Stop Times: 9/5/2023 10:00 AM and 9/5/2023 10:04 AM       Pre-Anesthestic Checklist: patient identified, IV checked, site marked, risks and benefits discussed, informed consent, monitors and equipment checked, pre-op evaluation, at physician/surgeon's request and post-op pain management  Timeout:       Correct Patient: Yes        Correct Procedure: Yes        Correct Site: Yes        Correct Position: Yes        Correct Laterality: Yes        Site Marked: Yes  Procedure Documentation  Procedure: IPACK       Diagnosis: POST OPERATIVE PAIN CONTROL       Laterality: right       Patient Position: supine       Patient Prep/Sterile Barriers: sterile gloves, mask       Skin prep: Chloraprep       Local skin infiltrated with 0.5 mL of. Local skin infiltration: 0.5% Bupivacaine.       Needle Type: insulated and short bevel       Needle Gauge: 20.        Needle Length (Inches): 6        Ultrasound guided       1. Ultrasound was used to identify targeted nerve, plexus, vascular marker, or fascial plane and place a needle adjacent to it in real-time.       2. Ultrasound was used to visualize the spread of anesthetic in close proximity to the above referenced structure.       3. A permanent image is entered into the patient's record.       4. The visualized anatomic structures appeared normal.       5. There were no apparent abnormal pathologic findings.    Assessment/Narrative         The placement was negative for: blood aspirated, painful injection and site bleeding       Paresthesias: No.       Bolus given via needle..        Secured via.        Insertion/Infusion Method: Single Shot       Complications: none    Medication(s) Administered   Bupivacaine 0.5% PF (Infiltration) - Infiltration   15 mL - 9/5/2023 10:04:00 AM  Dexamethasone 10 mg/mL PF (Perineural) -  "Perineural   5 mg - 9/5/2023 10:04:00 AM  Medication Administration Time: 9/5/2023 10:00 AM      FOR [facility] ([facility]) ONLY:   Pain Team Contact information: please page the Pain Team Via [employer]. Search \"Pain\". During daytime hours, please page the attending first. At night please page the resident first.      "

## 2023-09-05 NOTE — PROGRESS NOTES
"[facility] [facility] ADMISSION NOTE    Patient admitted to room 350 at approximately 1250 via bed from surgery. Patient was accompanied by spouse and sister in law.    Verbal SBAR report received from [place] prior to patient arrival.     Patient trasferred to bed via bed  Patient alert and oriented X 3. The patient is not having any pain.  . Admission vital signs: Blood pressure (!) 148/82, pulse 74, temperature (!) 96.5  F (35.8  C), temperature source Tympanic, resp. rate 18, height 1.702 m (5' 7\"), weight 106.6 kg (235 lb), SpO2 95 %. Patient was oriented to plan of care, call light, bed controls, tv, telephone, bathroom, and visiting hours.     Risk Assessment    The following safety risks were identified during admission: fall. Yellow risk band applied: YES.     Skin Initial Assessment    This writer admitted this patient and completed a full skin assessment and Elian score in the Adult PCS flowsheet. Appropriate interventions initiated as needed.     Secondary skin check completed by Zeynep Plummer Risk Assessment  Sensory Perception: 3-->slightly limited  Moisture: 3-->occasionally moist  Activity: 1-->bedfast  Mobility: 2-->very limited  Nutrition: 3-->adequate  Friction and Shear: 2-->potential problem  Elian Score: 14  Sensory/Activity/Mobility Interventions: Turn: left/right positioning, Pillows between bony prominence, Encourage activity/OOB, Heels suspended (pillows)  Moisture Interventions: Encourage regular toileting  Mattress: Standard gel/foam mattress (IsoFlex, Atmos Air, etc.)  Bed Frame: Standard width and length    Education    Patient has a New Site to Observation order: Yes  Observation education completed and documented: Yes      Sangeeta Buckner RN      "

## 2023-09-05 NOTE — PROGRESS NOTES
09/05/23 1500   Appointment Info   Signing Clinician's Name / Credentials (OT) Lety Pang OTR/L   Living Environment   People in Home spouse   Current Living Arrangements house   Transportation Anticipated family or friend will provide   Living Environment Comments Pt can live on one floor   Self-Care   Usual Activity Tolerance fair   Current Activity Tolerance fair   General Information   Onset of Illness/Injury or Date of Surgery 09/05/23   Referring Physician Dr. Garcia   Patient/Family Therapy Goal Statement (OT) To return home and regain funcitonal mobility with out pain   General Observations and Info Pt is S/P right knee replacement. He had his left knee replaced here in March. he starts outpatient rehab on Monday 11 September.   Cognitive Status Examination   Orientation Status orientation to person, place and time   Pain Assessment   Patient Currently in Pain Yes, see Vital Sign flowsheet   Range of Motion Comprehensive   General Range of Motion bilateral upper extremity ROM WFL   Strength Comprehensive (MMT)   General Manual Muscle Testing (MMT) Assessment no strength deficits identified   Coordination   Upper Extremity Coordination No deficits were identified   Bed Mobility   Comment (Bed Mobility) Assist and cues for Rt leg movement,   Transfers   Transfer Comments stood with FWW and transferred to recliner with CGA   Clinical Impression   Criteria for Skilled Therapeutic Interventions Met (OT) Yes, treatment indicated   OT Diagnosis impaired self cares and functional mobility   Influenced by the following impairments showering, LB dressing,   OT Problem List-Impairments impacting ADL activity tolerance impaired;strength;pain;post-surgical precautions   Assessment of Occupational Performance 1-3 Performance Deficits   Planned Therapy Interventions (OT) ADL retraining;ROM;strengthening;transfer training   Clinical Decision Making Complexity (OT) low complexity   Risk & Benefits of therapy have  been explained evaluation/treatment results reviewed;care plan/treatment goals reviewed;risks/benefits reviewed   Clinical Impression Comments Pt is moving well. He has all neccessary equipment and set up needed.   OT Total Evaluation Time   OT Eval, Low Complexity Minutes (73558) 15   OT Goals   Therapy Frequency (OT) Daily   OT Predicted Duration/Target Date for Goal Attainment 09/06/23   OT Goals Lower Body Bathing;Bed Mobility;Toilet Transfer/Toileting   OT: Lower Body Bathing Modified independent   OT: Bed Mobility Modified independent   OT: Toilet Transfer/Toileting Modified independent;toilet transfer   Therapeutic Activities   Therapeutic Activity Minutes (93248) 25   Symptoms noted during/after treatment dizziness   Treatment Detail/Skilled Intervention Pt performed bed mobility with min a for leg movement. TTransferred with FWW and CGA, sta in ercliner and reported mild dizziness   OT Discharge Planning   OT Plan Pt will be seen for self care retraining   OT Discharge Recommendation (DC Rec) home with assist   OT Rationale for DC Rec Pt will have no OT needs upon discharge   OT Brief overview of current status Pt erquired min A with supine to sit EOB. transferred torecliner with FWW and CGA,.   Total Session Time   Timed Code Treatment Minutes 25   Total Session Time (sum of timed and untimed services) 40

## 2023-09-05 NOTE — ANESTHESIA POSTPROCEDURE EVALUATION
Patient: Papo Gonzalez    Procedure: Procedure(s):  ARTHROPLASTY, KNEE, TOTAL       Anesthesia Type:  Spinal    Note:  Disposition: Admission   Postop Pain Control: Uneventful            Sign Out: Well controlled pain   PONV: No   Neuro/Psych: Uneventful            Sign Out: Acceptable/Baseline neuro status   Airway/Respiratory: Uneventful            Sign Out: Acceptable/Baseline resp. status   CV/Hemodynamics: Uneventful            Sign Out: Acceptable CV status; No obvious hypovolemia; No obvious fluid overload   Other NRE: NONE   DID A NON-ROUTINE EVENT OCCUR? No           Last vitals:  Vitals Value Taken Time   /69 09/05/23 1230   Temp 97.1  F (36.2  C) 09/05/23 1230   Pulse 73 09/05/23 1230   Resp 11 09/05/23 1230   SpO2 89 % 09/05/23 1230       Electronically Signed By: LUIZ YANG CRNA  September 5, 2023  4:47 PM

## 2023-09-05 NOTE — ANESTHESIA PROCEDURE NOTES
"Intrathecal injection Procedure Note    Pre-Procedure   Staff -        CRNA: Terese Lozada APRN CRNA       Performed By: CRNA       Location: OR       Procedure Start/Stop Times: 9/5/2023 10:42 AM and 9/5/2023 10:52 AM       Pre-Anesthestic Checklist: patient identified, IV checked, risks and benefits discussed, informed consent, monitors and equipment checked, pre-op evaluation, at physician/surgeon's request and post-op pain management  Timeout:       Correct Patient: Yes        Correct Procedure: Yes        Correct Site: Yes        Correct Position: Yes   Procedure Documentation  Procedure: intrathecal injection       Diagnosis: Primary Anesthesia       Patient Position: sitting       Patient Prep/Sterile Barriers: sterile gloves, mask, patient draped       Skin prep: Chloraprep       Insertion Site: L2-3. (midline approach).       Needle Gauge: 25.        Needle Length (Inches): 5        Spinal Needle Type: Hansa tip       Introducer used       Introducer: 20 G       # of attempts: 2 and  # of redirects:  3    Assessment/Narrative         Paresthesias: No.       CSF fluid: clear.    Medication(s) Administered   Medication Administration Time: 9/5/2023 10:42 AM      FOR [facility] ([facility]) ONLY:   Pain Team Contact information: please page the Pain Team Via [employer]. Search \"Pain\". During daytime hours, please page the attending first. At night please page the resident first.      "

## 2023-09-05 NOTE — ANESTHESIA PREPROCEDURE EVALUATION
Anesthesia Pre-Procedure Evaluation    Patient: Papo Gonzalez   MRN: [id number] : 1950        Procedure : Procedure(s):  ARTHROPLASTY, KNEE, TOTAL          History reviewed. No pertinent past medical history.   Past Surgical History:   Procedure Laterality Date    ARTHROPLASTY KNEE Left 3/21/2023    Procedure: ARTHROPLASTY, KNEE, TOTAL;  Surgeon: Caleb Garcia MD;  Location: [facility] OR    COLONOSCOPY  2016    tubular adenomas, repeat       No Known Allergies   Social History     Tobacco Use    Smoking status: Never     Passive exposure: Never    Smokeless tobacco: Never   Substance Use Topics    Alcohol use: Yes     Comment: 1 beer per week      Wt Readings from Last 1 Encounters:   23 106.6 kg (235 lb)        Anesthesia Evaluation   Pt has had prior anesthetic.     No history of anesthetic complications       ROS/MED HX  ENT/Pulmonary:  - neg pulmonary ROS     Neurologic:  - neg neurologic ROS     Cardiovascular:  - neg cardiovascular ROS     METS/Exercise Tolerance: >4 METS    Hematologic:  - neg hematologic  ROS     Musculoskeletal:   (+)  arthritis,             GI/Hepatic:  - neg GI/hepatic ROS     Renal/Genitourinary:  - neg Renal ROS     Endo:  - neg endo ROS     Psychiatric/Substance Use:  - neg psychiatric ROS     Infectious Disease:  - neg infectious disease ROS     Malignancy:  - neg malignancy ROS     Other:  - neg other ROS          Physical Exam    Airway  airway exam normal      Mallampati: II   TM distance: > 3 FB   Neck ROM: full   Mouth opening: > 3 cm    Respiratory Devices and Support         Dental       (+) Minor Abnormalities - some fillings, tiny chips      Cardiovascular   cardiovascular exam normal       Rhythm and rate: regular and normal     Pulmonary   pulmonary exam normal        breath sounds clear to auscultation           OUTSIDE LABS:  CBC:   Lab Results   Component Value Date    WBC 7.4 2023    WBC 7.6 2023    HGB 15.4 2023    HGB 13.8  03/22/2023    HCT 45.2 08/22/2023    HCT 49.7 03/07/2023     08/22/2023     03/07/2023     BMP:   Lab Results   Component Value Date     08/22/2023     12/06/2022    POTASSIUM 4.1 08/22/2023    POTASSIUM 4.3 12/06/2022    CHLORIDE 106 08/22/2023    CHLORIDE 102 12/06/2022    CO2 25 08/22/2023    CO2 26 12/06/2022    BUN 13.8 08/22/2023    BUN 13.5 12/06/2022    CR 1.10 08/22/2023    CR 1.13 12/06/2022     (H) 08/22/2023     (H) 03/22/2023     COAGS: No results found for: PTT, INR, FIBR  POC: No results found for: BGM, HCG, HCGS  HEPATIC:   Lab Results   Component Value Date    ALBUMIN 4.4 12/06/2022    PROTTOTAL 7.7 12/06/2022    ALT 36 12/06/2022    AST 26 12/06/2022    ALKPHOS 74 12/06/2022    BILITOTAL 0.5 12/06/2022     OTHER:   Lab Results   Component Value Date    LACT 1.3 03/21/2023    A1C 5.9 12/06/2022    LUIGI 9.2 08/22/2023       Anesthesia Plan    ASA Status:  2    NPO Status:  NPO Appropriate    Anesthesia Type: Spinal.              Consents    Anesthesia Plan(s) and associated risks, benefits, and realistic alternatives discussed. Questions answered and patient/representative(s) expressed understanding.     - Discussed: Risks, Benefits and Alternatives for BOTH SEDATION and the PROCEDURE were discussed     - Discussed with:  Patient      - Extended Intubation/Ventilatory Support Discussed: No.      - Patient is DNR/DNI Status: No     Use of blood products discussed: Yes.     - Discussed with: Patient.     - Consented: consented to blood products            Reason for refusal: other.     Postoperative Care    Pain management: IV analgesics, Peripheral nerve block (Single Shot), Multi-modal analgesia.   PONV prophylaxis: Ondansetron (or other 5HT-3), Dexamethasone or Solumedrol, Background Propofol Infusion     Comments:                LUIZ MELVIN CRNA

## 2023-09-05 NOTE — INTERVAL H&P NOTE
Brief History of Illness:   This patient was a 73 year old male who presented with right knee pain    H&P reviewed and patient examined with no new updates from prior exam

## 2023-09-05 NOTE — OP NOTE
Preoperative Diagnosis: Right Knee Osteoarthritis    Postoperative Diagnosis: Right Knee Osteoarthritis    Procedure: Right Total Knee Arthroplasty    Surgeon: Caleb Garcia MD    Assistants: Jr GOVEA    A skilled first assistant was required for patient positioning, retracting, and carrying out the procedure in a safe and effective manner    Anesthesia:   1) Spinal with Sedation  2) Adductor Canal Femoral Nerve Block  3) Local Injection around surgical site    Findings:    1) Tricompartmental OA   2) Stable TKA with ROM 0-125   3) Central Patella Tracking   4) Adequate hemostasis     Implants:    1) Qiana persona Size 10 Femoral Component   2) Size G Tibial Base Plate   3) Size 35 Patella   4) 11 mm Poly Insert    Tourniquet Time: Not Used    Estimated Blood Loss: 100 ml    Specimens: None    Drains: None    Complications: None    Indications:   This is a 73 year old patient with long standing right knee pain.  They have failed nonoperative treatment including use of NSAIDs; Tylenol; injections and exercise.  Given the continued symptoms they wished to proceed with a knee replacement.  The risks including pain, bleeding, infection, deep vein thrombosis, pulmonary embolism, myocardial infarction, component failure, need for revision operation was discussed with the patient.  The surgical consent was signed and surgical site was marked.  All questions regarding postoperative disposition were answered.      Description of Procedure:   The patient was administered a adductor canal femoral nerve block in the preoperative area.  They were then taken to the operating room and placed under spinal anesthesia.  A non-sterile tourniquet was applied and the right leg was prepped with a Chloraprep wipe and Chloraprep device and was draped in standard fashion.  A timeout was called to identify the correct patient and surgical site.  A midline incision and medial parapatellar arthrotomy was completed.  Adequate medial  and lateral releases were completed.  The patella was everted and 9.0 mm of bone resection was completed using the patella clamp.  The patella was sized to 35 mm and the lug holes were drilled.  The patella protector plate was placed.  The knee was flexed and the intramedullary canal was drilled.  The intramedullary 5 degree Right distal femoral guide was placed.  Standard bone resection was completed.  The posterior referencing femoral sizing guide was placed and the femur was sized to a 10.  This cutting guide was placed and the 4 chamfer cuts were made.  The ACL, PCL, Medial and Lateral Meniscus were resected.  The tibia was subluxed anteriorly and the posterior retractor was placed.  The extramedullary proximal tibia cutting guide and made 10 mm of bone resection off the less involved lateral compartment.  The flexion and extension gaps were checked and pins were removed.  The Femoral component was placed and the box cut for the posterior stabilizing component was made.  All trial implants were placed and the knee was brought out to extension.  The rotation of the tibial trial was marked.  The knee came out to full extension and flexed to 125 degrees with central patellar tracking.  The tibia was sized to a G and prepared in standard fashion.  The periarticular injection was completed and the bone ends were washed and dried.  Final implants were cemented in place.  Betadine and pulse lavage irrigation was used.   A 11 mm poly insert was chosen and confirmed knee ROM and patella tracking.  The arthrotomy was closed with a #1 Vicryl suture in interrupted fashion.  A 0 Vicryl running suture was used in the deep fascia.  The skin was closed with a 2-0 Vicryl and staples.  An Aquacel dressing was applied.  The patient was awakened and transferred to PACU in stable condition.      Postoperative Plan:   Routine TKA protocol (Weightbearing as tolerated, PT, Pain control, DVT prophylaxis with Aspirin).  Anticipate  discharge in 1-2 days.  Follow-up in  2 weeks in [facility].  No X-rays needed.

## 2023-09-05 NOTE — ANESTHESIA CARE TRANSFER NOTE
Patient: Papo Gonzalez    Procedure: Procedure(s):  ARTHROPLASTY, KNEE, TOTAL       Diagnosis: Chronic pain of right knee [M25.561, G89.29]  Diagnosis Additional Information: No value filed.    Anesthesia Type:   Spinal     Note:    Oropharynx: oropharynx clear of all foreign objects  Level of Consciousness: awake  Oxygen Supplementation: room air    Independent Airway: airway patency satisfactory and stable  Dentition: dentition unchanged  Vital Signs Stable: post-procedure vital signs reviewed and stable  Report to RN Given: handoff report given  Patient transferred to: PACU    Handoff Report: Identifed the Patient, Identified the Reponsible Provider, Reviewed the pertinent medical history, Discussed the surgical course, Reviewed Intra-OP anesthesia mangement and issues during anesthesia, Set expectations for post-procedure period and Allowed opportunity for questions and acknowledgement of understanding      Vitals:  Vitals Value Taken Time   BP     Temp     Pulse     Resp     SpO2         Electronically Signed By: LUIZ MELVIN CRNA  September 5, 2023  12:06 PM

## 2023-09-05 NOTE — OR NURSING
PACU Transfer Note    Papo Gonzalez was transferred to [facility] via bed.  Equipment used for transport:  O2.  Accompanied by:  Ratna CARROLL  Prescriptions were: n/a    PACU Respiratory Event Documentation     1) Episodes of Apnea greater than or equal to 10 seconds: 0    2) Bradypnea - less than 8 breaths per minute: 0    3) Pain score on 0 to 10 scale: 0    4) Pain-sedation mismatch (yes or no): no    5) Repeated 02 desaturation less than 90% (yes or no): no    Anesthesia notified? (yes or no): n/a    Any of the above events occuring repeatedly in separate 30 minute intervals may be considered recurrent PACU respiratory events.    Patient stable and meets phase 1 discharge criteria for transport from PACU.

## 2023-09-05 NOTE — PROVIDER NOTIFICATION
09/05/23 1315   Valuables   Patient Belongings remains with patient   Patient Belongings Remaining with Patient cell phone/electronics;clothing;glasses   Did you bring any home meds/supplements to the hospital?  No     A               Admission:  I am responsible for any personal items that are not sent to the safe or pharmacy.  [facility] is not responsible for loss, theft or damage of any property in my possession.    Signature:  _________________________________ Date: _______  Time: _____                                              Staff Signature:  ____________________________ Date: ________  Time: _____      2nd Staff person, if patient is unable/unwilling to sign:    Signature: ________________________________ Date: ________  Time: _____     Discharge:  [facility] has returned all of my personal belongings:    Signature: _________________________________ Date: ________  Time: _____                                          Staff Signature:  ____________________________ Date: ________  Time: _____

## 2023-09-05 NOTE — PROGRESS NOTES
"[facility]    Medicine Progress Note - Hospitalist Service    Date of Admission:  9/5/2023    Assessment & Plan   Principal Problem:    S/P total knee arthroplasty, right    Assessment: postoperative day # 0, mild pain now. No dyspnea, no nausea, vomiting.     Plan: physical and occupational therapy, pain control         Diet: Advance Diet as Tolerated: Regular Diet Adult  Discharge Instruction - Regular Diet Adult    DVT Prophylaxis: Defer to primary service  Castillo Catheter: Not present  Lines: None     Cardiac Monitoring: None  Code Status: Full Code      Clinically Significant Risk Factors Present on Admission                    # Acute Respiratory Failure: Documented O2 saturation < 91%.  Continue supplemental oxygen as needed     # Obesity: Estimated body mass index is 36.81 kg/m  as calculated from the following:    Height as of this encounter: 1.702 m (5' 7\").    Weight as of this encounter: 106.6 kg (235 lb).              Disposition Plan      Expected Discharge Date: 09/06/2023                  Maximino Suero MD  Hospitalist Service  [facility]  Securely message with [facility] (more info)  Text page via [employer] Paging/Directory   ______________________________________________________________________    Interval History   No complaints.    Physical Exam   Vital Signs: Temp: (!) 96  F (35.6  C) Temp src: Tympanic BP: (!) 143/86 Pulse: 71   Resp: 18 SpO2: 96 % O2 Device: None (Room air) Oxygen Delivery: 2 LPM  Weight: 235 lbs 0 oz    GENERAL: Comfortable, no apparent distress.  CARDIOVASCULAR: regular rate and rhythm, no murmur. No lower extremity edema   RESPIRATORY: Clear to auscultation bilaterally, no wheezes or crackles.  GI: non-tender, non-distended, normal bowel sounds.   SKIN: warm periphery, no rashes      Medical Decision Making       35 MINUTES SPENT BY ME on the date of service doing chart review, history, exam, documentation & further activities per the " note.

## 2023-09-06 ENCOUNTER — APPOINTMENT (OUTPATIENT)
Dept: OCCUPATIONAL THERAPY | Facility: OTHER | Age: 73
End: 2023-09-06
Attending: ORTHOPAEDIC SURGERY
Payer: MEDICARE

## 2023-09-06 ENCOUNTER — APPOINTMENT (OUTPATIENT)
Dept: PHYSICAL THERAPY | Facility: OTHER | Age: 73
End: 2023-09-06
Attending: ORTHOPAEDIC SURGERY
Payer: MEDICARE

## 2023-09-06 VITALS
RESPIRATION RATE: 18 BRPM | HEIGHT: 67 IN | OXYGEN SATURATION: 99 % | SYSTOLIC BLOOD PRESSURE: 139 MMHG | BODY MASS INDEX: 36.88 KG/M2 | TEMPERATURE: 97.5 F | HEART RATE: 87 BPM | WEIGHT: 235 LBS | DIASTOLIC BLOOD PRESSURE: 76 MMHG

## 2023-09-06 LAB
GLUCOSE BLDC GLUCOMTR-MCNC: 146 MG/DL (ref 70–99)
HGB BLD-MCNC: 13.4 G/DL (ref 13.3–17.7)
HOLD SPECIMEN: NORMAL
HOLD SPECIMEN: NORMAL

## 2023-09-06 PROCEDURE — 250N000013 HC RX MED GY IP 250 OP 250 PS 637: Performed by: ORTHOPAEDIC SURGERY

## 2023-09-06 PROCEDURE — 97535 SELF CARE MNGMENT TRAINING: CPT | Mod: GO

## 2023-09-06 PROCEDURE — 96376 TX/PRO/DX INJ SAME DRUG ADON: CPT

## 2023-09-06 PROCEDURE — 97110 THERAPEUTIC EXERCISES: CPT | Mod: GP

## 2023-09-06 PROCEDURE — 97116 GAIT TRAINING THERAPY: CPT | Mod: GP

## 2023-09-06 PROCEDURE — 36415 COLL VENOUS BLD VENIPUNCTURE: CPT | Performed by: ORTHOPAEDIC SURGERY

## 2023-09-06 PROCEDURE — 97530 THERAPEUTIC ACTIVITIES: CPT | Mod: GO

## 2023-09-06 PROCEDURE — 99212 OFFICE O/P EST SF 10 MIN: CPT | Mod: 24 | Performed by: INTERNAL MEDICINE

## 2023-09-06 PROCEDURE — 82962 GLUCOSE BLOOD TEST: CPT

## 2023-09-06 PROCEDURE — 85018 HEMOGLOBIN: CPT | Performed by: ORTHOPAEDIC SURGERY

## 2023-09-06 PROCEDURE — 250N000011 HC RX IP 250 OP 636: Performed by: ORTHOPAEDIC SURGERY

## 2023-09-06 RX ORDER — OXYCODONE HYDROCHLORIDE 5 MG/1
5 TABLET ORAL EVERY 4 HOURS PRN
Qty: 20 TABLET | Refills: 0 | Status: SHIPPED | OUTPATIENT
Start: 2023-09-06 | End: 2024-02-06

## 2023-09-06 RX ADMIN — POLYETHYLENE GLYCOL 3350 17 G: 17 POWDER, FOR SOLUTION ORAL at 10:01

## 2023-09-06 RX ADMIN — ASPIRIN 81 MG: 81 TABLET, COATED ORAL at 10:01

## 2023-09-06 RX ADMIN — KETOROLAC TROMETHAMINE 15 MG: 15 INJECTION, SOLUTION INTRAMUSCULAR; INTRAVENOUS at 08:16

## 2023-09-06 RX ADMIN — SENNOSIDES AND DOCUSATE SODIUM 1 TABLET: 50; 8.6 TABLET ORAL at 10:01

## 2023-09-06 RX ADMIN — Medication 2 G: at 03:15

## 2023-09-06 RX ADMIN — ACETAMINOPHEN 975 MG: 325 TABLET, FILM COATED ORAL at 00:22

## 2023-09-06 RX ADMIN — ACETAMINOPHEN 975 MG: 325 TABLET, FILM COATED ORAL at 07:31

## 2023-09-06 RX ADMIN — KETOROLAC TROMETHAMINE 15 MG: 15 INJECTION, SOLUTION INTRAMUSCULAR; INTRAVENOUS at 03:15

## 2023-09-06 ASSESSMENT — ACTIVITIES OF DAILY LIVING (ADL)
ADLS_ACUITY_SCORE: 25
ADLS_ACUITY_SCORE: 24
ADLS_ACUITY_SCORE: 26
ADLS_ACUITY_SCORE: 26
ADLS_ACUITY_SCORE: 25
ADLS_ACUITY_SCORE: 24

## 2023-09-06 NOTE — PROGRESS NOTES
Subjective: The patient is POD #1 s/p R Total Knee Arthroplasty.  The patients pain is controlled fairly well.  They deny shortness of breath, chest pain, nausea or vomiting.  There have been no acute perioperative complications    Objective:   B/P: 135/75, Temp: 97.4, Pulse: 92, Resp: 18    Alert and Orientated x3; No acute distress; Appears comfortable    Knee Exam: dressing/wound is clean, dry, intact without significant drainage.  No erythema or signs of infection.     No evidence of DVT    Distal motor/sensory exam is intact in the superficial peroneal, deep peroneal and tibial nerve distributions.      Normal capillary refill with a palpable dorsalis pedis pulse.    Hemoglobin   Date Value Ref Range Status   09/06/2023 13.4 13.3 - 17.7 g/dL Final       Assessment/Plan:     1) POD # 1 S/P R Total Knee Arthroplasty  -Pain Controlled  -PT/OT--ROM and Gait training  -DVT prophylaxis with Aspirin  -Dispo--To home when cleared Medically and by PT  -Follow-up in 2 weeks [facility]  -Hospitalist co-management

## 2023-09-06 NOTE — PROGRESS NOTES
09/05/23 1616   Appointment Info   Signing Clinician's Name / Credentials (PT) Stevenson Durán MPT   Living Environment   People in Home spouse   Current Living Arrangements house   Home Accessibility no concerns   Transportation Anticipated family or friend will provide   Self-Care   Usual Activity Tolerance good   Current Activity Tolerance fair   Equipment Currently Used at Home none   Fall history within last six months no   General Information   Referring Physician Radha   Patient/Family Therapy Goals Statement (PT) return home   Weight-Bearing Status - LLE full weight-bearing   Weight-Bearing Status - RLE weight-bearing as tolerated   Cognition   Affect/Mental Status (Cognition) WFL   Orientation Status (Cognition) oriented x 4   Follows Commands (Cognition) WFL   Pain Assessment   Patient Currently in Pain Yes, see Vital Sign flowsheet   Integumentary/Edema   Integumentary/Edema Comments surgical wound right knee   Posture    Posture Not impaired   Range of Motion (ROM)   Range of Motion ROM is WFL   ROM Comment right knee (10-70) degrees with ACE wrap   Strength (Manual Muscle Testing)   Strength (Manual Muscle Testing) strength is WFL   Strength Comments right knee not tested post-op   Bed Mobility   Comment, (Bed Mobility) minimal assist   Transfers   Impairments Contributing to Impaired Transfers pain;decreased strength   Comment, (Transfers) minimal assist with Fww   Gait/Stairs (Locomotion)   [place] Level (Gait) contact guard   Assistive Device (Gait) walker, front-wheeled   Distance in Feet 20   Pattern (Gait) step-to   Balance   Balance Comments good with Fww   Sensory Examination   Sensory Perception WFL   Coordination   Coordination no deficits were identified   Muscle Tone   Muscle Tone no deficits were identified   Clinical Impression   Criteria for Skilled Therapeutic Intervention Yes, treatment indicated   PT Diagnosis (PT) impaired mobility   Influenced by the following impairments pain  and fatigue   Functional limitations due to impairments activity/gait tolerance and stability   Clinical Presentation (PT Evaluation Complexity) Stable/Uncomplicated   Clinical Decision Making (Complexity) low complexity   Planned Therapy Interventions (PT) bed mobility training;gait training;home exercise program;transfer training   Risk & Benefits of therapy have been explained evaluation/treatment results reviewed;patient   PT Total Evaluation Time   PT Eval, Low Complexity Minutes (47169) 15   Physical Therapy Goals   PT Frequency Daily   PT Predicted Duration/Target Date for Goal Attainment 09/06/23   PT Goals Bed Mobility;Transfers;Gait   PT: Bed Mobility Supervision/stand-by assist   PT: Transfers Supervision/stand-by assist;Assistive device   PT: Gait Supervision/stand-by assist;Rolling walker;150 feet   PT Discharge Planning   PT Plan continue PT   PT Discharge Recommendation (DC Rec) home with assist;home with outpatient physical therapy   PT Rationale for DC Rec to promote strength stability and safe mobility   PT Brief overview of current status minimal assist for all mobilities using a Fww; fair pain control; supportive spouse; patient scheduled for outpatient PT on 9/11/23   Total Session Time   Total Session Time (sum of timed and untimed services) 15

## 2023-09-06 NOTE — PROGRESS NOTES
SAFETY CHECKLIST  ID Bands and Risk clasps correct and in place (DNR, Fall risk, Allergy, Latex, Limb):  Yes  All Lines Reconciled and labeled correctly: Yes  Whiteboard updated:Yes  Environmental interventions: Yes    Sangeeta Buckner RN on 9/6/2023 at 8:06 AM

## 2023-09-06 NOTE — PHARMACY - DISCHARGE MEDICATION RECONCILIATION AND EDUCATION
Pharmacy:  Discharge Counseling and Medication Reconciliation    Papo Gonzalez  [address] [facility] [address]  [phone] (home)   73 year old male  PCP: Donya Chavarria    Allergies: Patient has no known allergies.    Discharge Counseling:    Pharmacist met with patient (and/or family) today to review the medication portion of the After Visit Summary (with an emphasis on NEW medications) and to address patient's questions/concerns.    Summary of Education: Counseled patient on new medications of Oxycodone, Aspirin, Ibuprofen, Acetaminophen, and Senna-docusate including indication, administration, and possible common side effects.    Oxycodone: counseled patient that this is an oral narcotic pain medication that can cause drowsiness and slow reaction time- patient should not drive after taking, and should not combine with alcohol. Also counseled to take medication with food and that it has a tendency to cause constipation- pt prescribed Senna-docusate to help prevent this.    Aspirin: counseled pt on importance of taking Aspirin 81 mg twice daily for 30 days to help prevent blood clots following surgery.    Ibuprofen/Acetaminophen: alerted patient these were also prescribed for pain control    Materials Provided:  MedCounselor sheets printed from Clinical Pharmacology on: Oxycodone, Ibuprofen, Senna-docusate, Aspirin    Discharge Medication Reconciliation:    It has been determined that the patient has an adequate supply of medications available or which can be obtained from the patient's preferred pharmacy, which he has confirmed as: [facility].    Thank you for the consult.    Jose Angel Alejandre [facility]........September 6, 2023 1:37 PM

## 2023-09-06 NOTE — PROGRESS NOTES
[place] [facility] DISCHARGE NOTE    Patient discharged to home at 12:45 PM via wheel chair. Accompanied by spouse and staff. Discharge instructions reviewed with patient and spouse, opportunity offered to ask questions. Prescriptions sent to patients preferred pharmacy. All belongings sent with patient.      Sangeeta Buckner RN on 9/6/2023 at 12:45 PM

## 2023-09-06 NOTE — PROGRESS NOTES
Occupational Therapy Discharge Summary    Reason for therapy discharge:    All goals and outcomes met, no further needs identified.    Progress towards therapy goal(s). See goals on Care Plan in [facility] electronic health record for goal details.  Goals met    Therapy recommendation(s):    Continue home exercise program.         09/06/23 0900   Appointment Info   Signing Clinician's Name / Credentials (OT) Kenya Dubois OTR/L   Interventions   Interventions Quick Adds Self-Care/Home Management   Self-Care/Home Management   Self-Care/Home Mgmt/ADL, Compensatory, Meal Prep Minutes (11625) 45   Symptoms Noted During/After Treatment (Meal Preparation/Planning Training) fatigue   [place] Level (Grooming Training) stand-by assist   Assistance (Grooming Training) supervision   Assistance (Bathing Training) 1 person assist;set-up required;supervision   Assistive Device (Bathing Training) grab bars;detachable shower head  (shower chair)   Lower Body Dressing Training Assistance moderate assist (50% patient effort)   Lower Body Dressing Training Assistance 1 person assist;set-up required   [place] Level (Toilet Training) contact guard   Assistance (Toilet Training) 1 person assist   Toilet Training Assistance - Assistive Device wall grab bar   Therapeutic Activities   Therapeutic Activity Minutes (74742) 15   Symptoms noted during/after treatment fatigue   Treatment Detail/Skilled Intervention Min A x 1 with FWW sit to stand from recliner.  CGA with FWW for functional ambulation within room during self cares.   OT Discharge Planning   OT Plan Pt will be seen for self care retraining   OT Discharge Recommendation (DC Rec) home with assist   OT Rationale for DC Rec Pt will have no OT needs upon discharge   OT Brief overview of current status Pain is under control, patient was educated in safe mobility during self-cares.   Total Session Time   Timed Code Treatment Minutes 60   Total Session Time (sum of timed and untimed  services) 60

## 2023-09-06 NOTE — PLAN OF CARE
Pt is A&Ox4 and vitally stable on RA. Pt has rated his pain anywhere from 1-3 out of ten. Pt has only gotten tylenol and toradol from me. He continues to ice his knee on and off during the night. Pt did get up to use the bathroom a couple of times. Dressing from surgery is CDI and will continue to monitor.      Problem: Plan of Care - These are the overarching goals to be used throughout the patient stay.    Goal: Optimal Comfort and Wellbeing  Outcome: Progressing  Intervention: Monitor Pain and Promote Comfort  Recent Flowsheet Documentation  Taken 9/5/2023 1931 by Heron Rodriguez, RN  Pain Management Interventions: medication (see MAR)

## 2023-09-06 NOTE — PLAN OF CARE
"Pt met all required discharge requirements.     Problem: Plan of Care - These are the overarching goals to be used throughout the patient stay.    Goal: Plan of Care Review  Description: The Plan of Care Review/Shift note should be completed every shift.  The Outcome Evaluation is a brief statement about your assessment that the patient is improving, declining, or no change.  This information will be displayed automatically on your shift note.  Outcome: Met  Goal: Patient-Specific Goal (Individualized)  Description: You can add care plan individualizations to a care plan. Examples of Individualization might be:  \"Parent requests to be called daily at 9am for status\", \"I have a hard time hearing out of my right ear\", or \"Do not touch me to wake me up as it startles me\".  Outcome: Met  Goal: Absence of Hospital-Acquired Illness or Injury  Outcome: Met  Intervention: Identify and Manage Fall Risk  Recent Flowsheet Documentation  Taken 9/6/2023 0731 by Sangeeta Buckner RN  Safety Promotion/Fall Prevention:   activity supervised   clutter free environment maintained  Intervention: Prevent Skin Injury  Recent Flowsheet Documentation  Taken 9/6/2023 0731 by Sangetea Buckner RN  Body Position: position changed independently  Goal: Optimal Comfort and Wellbeing  Outcome: Met  Intervention: Monitor Pain and Promote Comfort  Recent Flowsheet Documentation  Taken 9/6/2023 0731 by Sangeeta Buckner RN  Pain Management Interventions: medication (see MAR)  Goal: Readiness for Transition of Care  Outcome: Met   Goal Outcome Evaluation:       Sangeeta Buckner RN on 9/6/2023 at 12:58 PM                  " Subjective   89-year-old white female presents with shortness of breath.  History per family: Patient complained of 3 to 4-day history of worsening weakness and shortness of breath.  She has a history of leukemia and has finished chemotherapy.  The patient did not want to come to the hospital before today, and the family knew she was sick when she has to come to the hospital.  She was admitted approximately 1 month ago for pneumonia and pancytopenia.  No other history of present illness available.          Review of Systems   Unable to perform ROS: Intubated       Past Medical History:   Diagnosis Date   • Arthritis    • Diabetes mellitus (CMS/HCC)    • History of transfusion    • MDS (myelodysplastic syndrome) (CMS/HCC)        No Known Allergies    Past Surgical History:   Procedure Laterality Date   • BREAST SURGERY      cysts   • HYSTERECTOMY     • KNEE SURGERY         No family history on file.    Social History     Socioeconomic History   • Marital status:      Spouse name: Not on file   • Number of children: Not on file   • Years of education: Not on file   • Highest education level: Not on file   Tobacco Use   • Smoking status: Former Smoker   • Smokeless tobacco: Never Used   Substance and Sexual Activity   • Alcohol use: No     Frequency: Never   • Drug use: No   • Sexual activity: Defer           Objective   Physical Exam   Constitutional: She appears well-developed and well-nourished.   HENT:   Head: Normocephalic and atraumatic.   Mouth/Throat: Mucous membranes are dry.   Eyes: No scleral icterus.   Cardiovascular:   Asystolic   Pulmonary/Chest: Apnea noted.   Abdominal: Soft. Bowel sounds are absent.   Musculoskeletal: She exhibits no edema.   Neurological: She is unresponsive. GCS eye subscore is 1. GCS verbal subscore is 1. GCS motor subscore is 1.   Skin: There is pallor.   Cool   Nursing note and vitals reviewed.      Intubation  Date/Time: 1/23/2020 6:47 PM  Performed by: Ashish Guzman,  MD  Authorized by: Ashish Guzman MD     Consent:     Consent obtained:  Emergent situation  Pre-procedure details:     Patient status:  Unresponsive    Mallampati score:  III    Pretreatment medications:  None    Paralytics:  None  Procedure details:     Preoxygenation:  Bag valve mask    CPR in progress: yes      Intubation method:  Oral    Laryngoscope blade:  Mac 4    Tube size (mm):  7.5    Tube type:  Cuffed    Number of attempts:  1    Cricoid pressure: no      Tube visualized through cords: yes    Placement assessment:     ETT to lip:  19    Tube secured with:  ETT anand    Breath sounds:  Equal and absent over the epigastrium    Placement verification: chest rise, condensation, direct visualization, equal breath sounds and ETCO2 detector      CXR findings:  ETT in proper place  Post-procedure details:     Patient tolerance of procedure:  Tolerated well, no immediate complications               ED Course  ED Course as of Jan 23 1902   Thu Jan 23, 2020   1851 Immediately after the patient was triaged in the room, while I was on the way to the room, the patient became bradycardic.  This progressed to PEA.  Family wished to continue with full resuscitation efforts.  CPR was begun, patient was intubated.  Resuscitation continued per ACLS protocol.  After epinephrine, bicarb, CPR and oxygenation, the patient regained spontaneous circulation.  She was tachycardic in the 130s.    Over the next several minutes, the patient was having some spontaneous respirations, her mental status improved and she was making some purposeful movements, and seemed to respond to some verbal stimuli.  Further discussions with the family revealed that after the patient's recent hospitalization for pneumonia, she told her daughter that she did not wish to be on any life support.  The patient's daughters together decided to continue current efforts, but if she should have repeat cardiac arrest, no further resuscitation would be  undertaken.  Within a few minutes of this discussion, the patient's heart rate decreased from the 130s to the 40s.  The family was allowed to be present and reaffirmed that they did not want any further resuscitation.  Bradycardia progressed to asystole and the patient was pronounced dead at 1830.  Given her recent surgery, and presentation of hypotension and tachycardia, she was most likely septic.  This was possibly related to her pancytopenia.    [BC]      ED Course User Index  [BC] Ashish Guzman MD                                               MDM  Number of Diagnoses or Management Options  Cardiac arrest (CMS/HCC):      Amount and/or Complexity of Data Reviewed  Clinical lab tests: reviewed  Tests in the medicine section of CPT®: reviewed        Final diagnoses:   Cardiac arrest (CMS/HCC)   Sepsis with acute respiratory failure and septic shock, due to unspecified organism, unspecified whether hypoxia or hypercapnia present (CMS/HCC)   History of pancytopenia   Leukemia not having achieved remission, unspecified leukemia type (CMS/HCC)            Ashish Guzman MD  01/23/20 [id number]

## 2023-09-06 NOTE — DISCHARGE SUMMARY
"[facility]  Hospitalist Discharge Summary      Date of Admission:  9/5/2023  Date of Discharge:  9/6/2023   Discharging Provider: Maximino Suero MD  Discharge Service: Hospitalist Service    Discharge Diagnoses   Principal Problem:    S/P total knee arthroplasty, right  Active Problems:    Right knee pain       Clinically Significant Risk Factors     # Obesity: Estimated body mass index is 36.81 kg/m  as calculated from the following:    Height as of this encounter: 1.702 m (5' 7\").    Weight as of this encounter: 106.6 kg (235 lb).       Follow-ups Needed After Discharge   Follow-up Appointments     Follow Up Care      Follow-up with Dr. Garcia on 9/20 at 11 AM            Discharge Disposition   Discharged to home  Condition at discharge: Stable    Hospital Course   Principal Problem:    S/P total knee arthroplasty, right    Assessment: postoperative day # 1, mild pain now. No dyspnea, no nausea, vomiting.     Discharge with physical and occupational therapy, pain control. Aspirin ordered by Dr. Hobbs for DVT prophylaxis.       Consultations This Hospital Stay   HOSPITALIST IP CONSULT  PHYSICAL THERAPY ADULT IP CONSULT  OCCUPATIONAL THERAPY ADULT IP CONSULT    Code Status   Full Code    Time Spent on this Encounter   I, Maximino Suero MD, personally saw the patient today and spent greater than 30 minutes discharging this patient.       Maximino Suero MD  [facility]  [address]  [place] [address]  Phone: [phone]  Fax: [phone]  ______________________________________________________________________    Physical Exam   Vital Signs: Temp: 97.5  F (36.4  C) Temp src: Tympanic BP: 139/76 Pulse: 87   Resp: 18 SpO2: 99 % O2 Device: None (Room air) Oxygen Delivery: 2 LPM  Weight: 235 lbs 0 oz  GENERAL: Comfortable, no apparent distress.           Primary Care Physician   Donya Chavarria    Discharge Orders      Reason for your hospital stay    " "Status Post Right Total Knee Arthroplasty     When to call - Contact Surgeon Team    You may experience symptoms that require follow-up before your scheduled appointment. Refer to the \"Stoplight Tool\" for instructions on when to contact your Surgeon Team if you are concerned about pain control, blood clots, constipation, or if you are unable to urinate.     When to call - Reach out to Urgent Care    If you are not able to reach your Surgeon Team and you need immediate care, go to the Orthopedic Walk-in Clinic or Urgent Care at your Surgeon's office.  Do NOT go to the Emergency Room unless you have shortness of breath, chest pain, or other signs of a medical emergency.     When to call - Reasons to Call 911    Call 911 immediately if you experience sudden-onset chest pain, arm weakness/numbness, slurred speech, or shortness of breath     Discharge Instruction - Breathing exercises    Perform breathing exercises using your Incentive Spirometer 10 times per hour while awake for 2 weeks.     Symptoms - Fever Management    A low grade fever can be expected after surgery.  Use acetaminophen (TYLENOL) as needed for fever management.  Contact your Surgeon Team if you have a fever greater than 101.5 F, chills, and/or night sweats.     Symptoms - Constipation management    Constipation (hard, dry bowel movements) is expected after surgery due to the combination of being less active, the anesthetic, and the opioid pain medication.  You can do the following to help reduce constipation:  ~  FLUIDS:  Drink clear liquids (water or Gatorade), or juice (apple/prune).  ~  DIET:  Eat a fiber rich diet.    ~  ACTIVITY:  Get up and move around several times a day.  Increase your activity as you are able.  MEDICATIONS:  Reduce the risk of constipation by starting medications before you are constipated.  You can take Miralax   (1 packet as directed) and/or a stool softener (Senokot 1-2 tablets 1-2 times a day).  If you already have " constipation and these medications are not working, you can get magnesium citrate and use as directed.  If you continue to have constipation you can try an over the counter suppository or enema.  Call your Surgeon Team if it has been greater than 3 days since your last bowel movement.     Symptoms - Reduced Urine Output    Changes in the amount of fluids you drank before and after surgery may result in problems urinating.  It is important to stay well-hydrated after surgery and drink plenty of water. If it has been greater than 8 hours since you have urinated despite drinking plenty of water, call your Surgeon Team.     Activity - Exercises to prevent blood clots    Unless otherwise directed by your Surgeon team, perform the following exercises at least three times per day for the first four weeks after surgery to prevent blood clots in your legs: 1) Point and flex your feet (Ankle Pumps), 2) Move your ankle around in big circles, 3) Wiggle your toes, 4) Walk, even for short distances, several times a day, will help decrease the risk of blood clots.     Comfort and Pain Management - Pain after Surgery    Pain after surgery is normal and expected.  You will have some amount of pain for several weeks after surgery.  Your pain will improve with time.  There are several things you can do to help reduce your pain including: rest, ice, elevation, and using pain medications as needed. Contact your Surgeon Team if you have pain that persists or worsens after surgery despite rest, ice, elevation, and taking your medication(s) as prescribed. Contact your Surgeon Team if you have new numbness, tingling, or weakness in your operative extremity.     Comfort and Pain Management - Swelling after Surgery    Swelling and/or bruising of the surgical extremity is common and may persist for several months after surgery. In addition to frequent icing and elevation, gentle compressive support with an ACE wrap or tubigrip may help with  "swelling. Apply compression regularly, removing at least twice daily to perform skin checks. Contact your Surgeon Team if your swelling increases and is NOT associated with an increase in your activity level, or if your swelling increases and is associated with redness and pain.     Comfort and Pain Management - LOWER Extremity Elevation    Swelling is expected for several months after surgery. This type of swelling is usually associated with gravity and activity, and can be improved with elevation.   The best way to do this is to get your \"toes above your nose\" by laying down and placing several pillows lengthwise under your calf and heel. When elevating your leg keep your knee completely straight. Perform this elevation as often as possible especially for the first two weeks after surgery.     Comfort and Pain Management - Cold therapy    Ice can be used to control swelling and discomfort after surgery. Place a thin towel over your operative site and apply the ice pack overtop. Leave ice pack in place for 20 minutes, then remove for 20 minutes. Repeat this 20 minutes on/20 minutes off routine as often as tolerated.     Medication Instructions - Acetaminophen (TYLENOL) Instructions    You were discharged with acetaminophen (TYLENOL) for pain management after surgery. Acetaminophen most effectively manages pain symptoms when it is taken on a schedule without missing doses (every four, six, or eight hours). Your Provider will prescribe a safe daily dose between 3000 - 4000 mg.  Do NOT exceed this daily dose. Most patients use acetaminophen for pain control for the first four weeks after surgery.  You can wean from this medication as your pain decreases.     Medication Instructions - NSAID Instructions    You were discharged with an anti-inflammatory medication for pain management to use in combination with acetaminophen (TYLENOL) and the narcotic pain medication.  Take this medication exactly as directed.  You should " only take one anti-inflammatory at a time.  Some common anti-inflammatories include: ibuprofen (ADVIL, MOTRIN), naproxen (ALEVE, NAPROSYN), celecoxib (CELEBREX), meloxicam (MOBIC), ketorolac (TORADOL).  Take this medication with food and water.     Medication Instructions - Opioids - Tapering Instructions    In the first three days following surgery, your symptoms may warrant use of the narcotic pain medication every four to six hours as prescribed. This is normal. As your pain symptoms improve, focus your efforts on decreasing (tapering) use of narcotic medications. The most successful tapering strategy is to first, decrease the number of tablets you take every 4-6 hours to the minimum prescribed. Then, increase the amount of time between doses.  For example:  First, taper to   or 1 tablet every 4-6 hours.  Then, taper to   or 1 tablet every 6-8 hours.  Then, taper to   or 1 tablet every 8-10 hours.  Then, taper to   or 1 tablet every 10-12 hours.  Then, taper to   or 1 tablet at bedtime.  The bedtime dose can help with comfort during sleep and is typically the last dose to be discontinued after surgery.     Activity - Total Knee Arthroplasty     Return to Driving    Return to driving - Driving is NOT permitted until directed by your provider. Under no circumstance are you permitted to drive while using narcotic pain medications.     Dressing / Wound Care - Wound    You have a clean dressing on your surgical wound. Dressing change instructions as follows: dressing will be removed at your follow-up appointment. Contact your Surgeon Team if you have increased redness, warmth around the surgical wound, and/or drainage from the surgical wound.     Dressing / Wound Care - NO Tub Bathing    Tub bathing, swimming, or any other activities that will cause your incision to be submerged in water should be avoided until allowed by your Surgeon.     Medication instructions -  Anticoagulation - aspirin    Take the aspirin as  prescribed for a total of four weeks after surgery.  This is given to help minimize your risk of blood clot.     Medication Instructions - Opioid Instructions (Greater than or equal to 65 years)    You were discharged with an opioid medication (hydromorphone, oxycodone, hydrocodone, or tramadol). This medication should only be taken for breakthrough pain that is not controlled with acetaminophen (TYLENOL). If you rate your pain less than 3 you do not need this medication.  Pain rating 0-3:  You do not need this medication  Pain rating 4-6:  Take 1/2 tablet every 4-6 hours as needed  Pain rating 7-10:  Take 1 tablet every 4-6 hours as needed  Do not exceed 4 tablets per day     NO Precautions    No precautions directed by your Provider.  You may perform range of motion activities as tolerated.     Weight bearing as tolerated    Weight bearing as tolerated on your operative extremity.     Dressing / Wound care - Shower with wound/dressing covered    You must COVER your dressing or incision with saran wrap (or any other non-permeable covering) to allow the incision to remain dry while showering.  You may shower 3 days after surgery as long as the surgical wound stays dry. Continue to cover your dressing or incision for showering until your first office visit.  You are strictly prohibited from soaking   or submerging the surgical wound underwater.     Follow Up Care    Follow-up with Dr. Garcia on 9/20 at 11 AM     Discharge Instruction - Regular Diet Adult    Return to your pre-surgery diet unless instructed otherwise       Significant Results and Procedures   Most Recent 3 CBC's:  Recent Labs   Lab Test 09/06/23  0541 08/22/23  1133 03/22/23  0554 03/07/23  1448   WBC  --  7.4  --  7.6   HGB 13.4 15.4 13.8 16.9   MCV  --  92  --  93   PLT  --  210  --  239     Most Recent 3 BMP's:  Recent Labs   Lab Test 09/06/23  0523 09/05/23  1357 09/05/23  0937 08/22/23  1133 03/22/23  0611 12/06/22  1129   NA  --   --   --  140   --  138   POTASSIUM  --   --   --  4.1  --  4.3   CHLORIDE  --   --   --  106  --  102   CO2  --   --   --  25  --  26   BUN  --   --   --  13.8  --  13.5   CR  --   --   --  1.10  --  1.13   ANIONGAP  --   --   --  9  --  10   LUIGI  --   --   --  9.2  --  9.1   * 167* 118* 115*   < > 117*    < > = values in this interval not displayed.   ,   Results for orders placed or performed during the hospital encounter of 09/05/23   XR Knee Port Right 1/2 Views    Narrative    PROCEDURE:  XR KNEE PORT RIGHT 1/2 VIEWS    HISTORY: Post-Op Total Knee    COMPARISON:  None.    TECHNIQUE:  2 views of the right knee were obtained.    FINDINGS:  There is a right knee prosthesis in place. The prosthetic  elements appear well seated. Gas is seen in the soft tissues of the  knee.       Impression    IMPRESSION: Knee prosthesis in place.      SHANTHI ELLIS MD         SYSTEM ID:  [id number]       Discharge Medications   Current Discharge Medication List        START taking these medications    Details   acetaminophen (TYLENOL) 325 MG tablet Take 2 tablets (650 mg) by mouth every 4 hours as needed for other (mild pain)  Qty: 100 tablet, Refills: 0    Associated Diagnoses: Chronic pain of right knee      aspirin 81 MG EC tablet Take 1 tablet (81 mg) by mouth 2 times daily  Qty: 60 tablet, Refills: 0    Associated Diagnoses: Chronic pain of right knee      ibuprofen (ADVIL/MOTRIN) 600 MG tablet Take 1 tablet (600 mg) by mouth every 6 hours as needed for mild pain  Qty: 30 tablet, Refills: 0    Associated Diagnoses: Chronic pain of right knee      oxyCODONE (ROXICODONE) 5 MG tablet Take 1 tablet (5 mg) by mouth every 4 hours as needed for moderate pain  Qty: 20 tablet, Refills: 0    Associated Diagnoses: S/P total knee arthroplasty, right      senna-docusate (SENOKOT-S/PERICOLACE) 8.6-50 MG tablet Take 1-2 tablets by mouth 2 times daily Take while on oral narcotics to prevent or treat constipation.  Qty: 30 tablet, Refills: 0     Comments: While taking narcotics  Associated Diagnoses: Chronic pain of right knee           CONTINUE these medications which have NOT CHANGED    Details   tamsulosin (FLOMAX) 0.4 MG capsule Take 1 capsule (0.4 mg) by mouth daily  Qty: 90 capsule, Refills: 3    Associated Diagnoses: Urinary hesitancy           Allergies   No Known Allergies

## 2023-09-07 ENCOUNTER — CARE COORDINATION (OUTPATIENT)
Dept: FAMILY MEDICINE | Facility: OTHER | Age: 73
End: 2023-09-07
Payer: MEDICARE

## 2023-09-07 NOTE — PROGRESS NOTES
09/06/23 1654   Appointment Info   Signing Clinician's Name / Credentials (PT) Stevenson Luis Armando MPT   Gait/Stairs (Locomotion)   Distance in Feet (Gait) 200   Interventions   Interventions Quick Adds Gait Training;Therapeutic Activity;Therapeutic Procedure   Therapeutic Procedure/Exercise   Treatment Detail/Skilled Intervention review of home exercise program   Therapeutic Activity   Symptoms Noted During/After Treatment Fatigue;Increased pain   Treatment Detail/Skilled Intervention review of supine<>sit transition and safe transfer techniques   Gait Training   Symptoms Noted During/After Treatment (Gait Training) fatigue;increased pain   Treatment Detail/Skilled Intervention ambulation in hallway   [place] Level (Gait Training) stand-by assist   Physical Assistance Level (Gait Training) supervision   Weight Bearing (Gait Training) weight-bearing as tolerated   Assistive Device (Gait Training) rolling walker   Pattern Analysis (Gait Training) swing-to gait   Gait Analysis Deviations decreased kamila;decreased velocity of limb motion;decreased step length   Impairments (Gait Analysis/Training) balance impaired;pain;ROM decreased;strength decreased   PT Discharge Planning   PT Plan patient discharging   PT Discharge Recommendation (DC Rec) home with assist;home with outpatient physical therapy   PT Rationale for DC Rec to promote strength stability and safe mobility   PT Brief overview of current status SBA for all mobilities; fair pain control; fair AAROM of right knee; motivated patient with PMHX of left TKA this year

## 2023-09-07 NOTE — PROGRESS NOTES
Surgical. Patient discharged with surgical follow-up only. No TCM call required per policy. Patient has a follow up appointment with Dr. Garcia on 9/20 at 11 AM. Sangeeta Venegas RN on 9/7/2023 at 10:10 AM

## 2023-09-11 ENCOUNTER — THERAPY VISIT (OUTPATIENT)
Dept: PHYSICAL THERAPY | Facility: OTHER | Age: 73
End: 2023-09-11
Attending: ORTHOPAEDIC SURGERY
Payer: MEDICARE

## 2023-09-11 DIAGNOSIS — G89.29 CHRONIC PAIN OF RIGHT KNEE: ICD-10-CM

## 2023-09-11 DIAGNOSIS — Z96.651 S/P TOTAL KNEE ARTHROPLASTY, RIGHT: Primary | ICD-10-CM

## 2023-09-11 DIAGNOSIS — M25.561 CHRONIC PAIN OF RIGHT KNEE: ICD-10-CM

## 2023-09-11 PROCEDURE — 97110 THERAPEUTIC EXERCISES: CPT | Mod: GP | Performed by: PHYSICAL THERAPIST

## 2023-09-11 PROCEDURE — 97161 PT EVAL LOW COMPLEX 20 MIN: CPT | Mod: GP | Performed by: PHYSICAL THERAPIST

## 2023-09-11 NOTE — PROGRESS NOTES
PHYSICAL THERAPY EVALUATION  Type of Visit: Evaluation    See electronic medical record for Abuse and Falls Screening details.    Subjective       Presenting condition or subjective complaint: Hx of R TKA on 9/5.  Date of onset: 09/05/23    Dates & types of surgery: 03/2023 left knee replacement, 1993 achielies tendon    Prior diagnostic imaging/testing results: X-ray     Prior therapy history for the same diagnosis, illness or injury: Yes 04 and 05 2023 for left knee replacement    Prior Level of Function  Transfers: Independent  Ambulation: Independent  ADL: Independent    Living Environment  Social support: With a significant other or spouse   Type of home: House   Stairs to enter the home: No       Ramp: No   Stairs inside the home: -- (he has stairs to an upstairs that he does not neet to use)       Help at home: None  Equipment owned: Four-point cane; Walker; Crutches; Standard wheelchair; Raised toilet seat   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 Light residual swelling  GAIT:  Weightbearing Status: WBAT  Assistive Device(s): Walker (front wheeled)  ROM:  10-80 ext-flex PROM R knee; L knee ext-flex WFL  STRENGTH:  n/a at this time  SPECIAL TESTS:  n/a  FUNCTIONAL TESTS:  n/a    Assessment & Plan   CLINICAL IMPRESSIONS  Medical Diagnosis: Chronic pain of right knee (M25.561, G89.29)    Treatment Diagnosis: post-op R TKA   Impression/Assessment: Patient is a 73 year old male with R knee complaints following R TKA on 9/5.  The following significant findings have been identified: Pain, Decreased ROM/flexibility, Decreased joint mobility, Decreased strength, Impaired balance, Decreased proprioception, Inflammation, Edema, Impaired gait,  Impaired muscle performance, and Decreased activity tolerance. These impairments interfere with their ability to perform self care tasks, recreational activities, and household chores as compared to previous level of function.     Clinical Decision Making (Complexity):  Clinical Presentation: Stable/Uncomplicated  Clinical Presentation Rationale: based on medical and personal factors listed in PT evaluation  Clinical Decision Making (Complexity): Low complexity    PLAN OF CARE  Treatment Interventions:  Modalities: Cryotherapy, Vasoneumatic Device  Interventions: Manual Therapy, Neuromuscular Re-education, Therapeutic Activity, Therapeutic Exercise, Aquatic Therapy    Long Term Goals     PT Goal 1  Goal Identifier: ROM  Goal Description: Pt to demo R knee AROM 0-110 for improved ADLs & mobility  Rationale: to maximize safety and independence with performance of ADLs and functional tasks  Target Date: 10/23/23  PT Goal 2  Goal Identifier: Strength  Goal Description: Pt to demo R knee MMT 5/5 flex & ext for improved strength and endurance thus leading to improved ambulating community distances  Rationale: to maximize safety and independence with performance of ADLs and functional tasks  Target Date: 11/20/23  PT Goal 3  Goal Identifier: ADLs  Goal Description: Pt to return to ADLs including yard work and house work without restrictions or R knee pain  Rationale: to maximize safety and independence with performance of ADLs and functional tasks  Target Date: 12/04/23      Frequency of Treatment: 2x/wk  Duration of Treatment: 12 weeks    Education Assessment:   Learner/Method: Patient;Listening;Reading;Demonstration;Pictures/Video;No Barriers to Learning    Risks and benefits of evaluation/treatment have been explained.   Patient/Family/caregiver agrees with Plan of Care.     Evaluation Time:     PT Eval, Low Complexity Minutes (27027): 10     Signing Clinician: Tung Borden, PT, ATC      [facility]  Rehabilitation Services                                                                                   OUTPATIENT PHYSICAL THERAPY      PLAN OF TREATMENT FOR OUTPATIENT REHABILITATION   Patient's Last Name, First Name, Papo Solis YOB: 1950   Provider's Name   [facility]   Medical Record No.  [id number]     Onset Date: 09/05/23  Start of Care Date: 09/11/23     Medical Diagnosis:  Chronic pain of right knee (M25.561, G89.29)      PT Treatment Diagnosis:  post-op R TKA Plan of Treatment  Frequency/Duration: 2x/wk/ 12 weeks    Certification date from 09/11/23 to 12/04/23         See note for plan of treatment details and functional goals     Tung Borden, PT                         I CERTIFY THE NEED FOR THESE SERVICES FURNISHED UNDER        THIS PLAN OF TREATMENT AND WHILE UNDER MY CARE     (Physician attestation of this document indicates review and certification of the therapy plan).                Referring Provider:  Caleb Garcia      Initial Assessment  See Epic Evaluation- Start of Care Date: 09/11/23

## 2023-09-12 ENCOUNTER — DOCUMENTATION ONLY (OUTPATIENT)
Dept: OTHER | Facility: CLINIC | Age: 73
End: 2023-09-12
Payer: MEDICARE

## 2023-09-13 ENCOUNTER — THERAPY VISIT (OUTPATIENT)
Dept: PHYSICAL THERAPY | Facility: OTHER | Age: 73
End: 2023-09-13
Attending: ORTHOPAEDIC SURGERY
Payer: MEDICARE

## 2023-09-13 DIAGNOSIS — M25.561 RIGHT KNEE PAIN, UNSPECIFIED CHRONICITY: Primary | ICD-10-CM

## 2023-09-13 DIAGNOSIS — Z96.651 S/P TOTAL KNEE ARTHROPLASTY, RIGHT: ICD-10-CM

## 2023-09-13 PROCEDURE — 97016 VASOPNEUMATIC DEVICE THERAPY: CPT | Mod: GP | Performed by: PHYSICAL THERAPIST

## 2023-09-13 PROCEDURE — 97110 THERAPEUTIC EXERCISES: CPT | Mod: GP | Performed by: PHYSICAL THERAPIST

## 2023-09-18 ENCOUNTER — THERAPY VISIT (OUTPATIENT)
Dept: PHYSICAL THERAPY | Facility: OTHER | Age: 73
End: 2023-09-18
Attending: ORTHOPAEDIC SURGERY
Payer: MEDICARE

## 2023-09-18 DIAGNOSIS — G89.29 CHRONIC PAIN OF RIGHT KNEE: Primary | ICD-10-CM

## 2023-09-18 DIAGNOSIS — M25.561 CHRONIC PAIN OF RIGHT KNEE: Primary | ICD-10-CM

## 2023-09-18 DIAGNOSIS — Z96.651 S/P TOTAL KNEE ARTHROPLASTY, RIGHT: ICD-10-CM

## 2023-09-18 PROCEDURE — 97016 VASOPNEUMATIC DEVICE THERAPY: CPT | Mod: GP | Performed by: PHYSICAL THERAPIST

## 2023-09-18 PROCEDURE — 97110 THERAPEUTIC EXERCISES: CPT | Mod: GP | Performed by: PHYSICAL THERAPIST

## 2023-09-20 ENCOUNTER — OFFICE VISIT (OUTPATIENT)
Dept: ORTHOPEDICS | Facility: OTHER | Age: 73
End: 2023-09-20
Attending: ORTHOPAEDIC SURGERY
Payer: MEDICARE

## 2023-09-20 ENCOUNTER — THERAPY VISIT (OUTPATIENT)
Dept: PHYSICAL THERAPY | Facility: OTHER | Age: 73
End: 2023-09-20
Attending: ORTHOPAEDIC SURGERY
Payer: MEDICARE

## 2023-09-20 DIAGNOSIS — Z96.651 STATUS POST TOTAL RIGHT KNEE REPLACEMENT: Primary | ICD-10-CM

## 2023-09-20 DIAGNOSIS — M25.561 RIGHT KNEE PAIN, UNSPECIFIED CHRONICITY: Primary | ICD-10-CM

## 2023-09-20 DIAGNOSIS — Z96.651 S/P TOTAL KNEE ARTHROPLASTY, RIGHT: ICD-10-CM

## 2023-09-20 PROCEDURE — 97016 VASOPNEUMATIC DEVICE THERAPY: CPT | Mod: GP | Performed by: PHYSICAL THERAPIST

## 2023-09-20 PROCEDURE — 99024 POSTOP FOLLOW-UP VISIT: CPT | Performed by: ORTHOPAEDIC SURGERY

## 2023-09-20 PROCEDURE — 97110 THERAPEUTIC EXERCISES: CPT | Mod: GP | Performed by: PHYSICAL THERAPIST

## 2023-09-20 PROCEDURE — G0463 HOSPITAL OUTPT CLINIC VISIT: HCPCS

## 2023-09-20 NOTE — PROGRESS NOTES
SUBJECTIVE:  Patient returns status post right total knee replacement.  Patient reports discomfort but manageable.  Patient has been going to therapy twice per week.  Patient does not require any pain medications at this time.    ROS: Musculoskeletal and general review of systems are negative, per review of previous clinic questionnaire.  Denies SOB and calf pain.    EXAM: Right knee examination shows incisional site to be healing well without signs or symptoms of infection.  Range of motion tolerable -5 to 90 degrees.  Neuro examination is intact.    IMAGING: None    ASSESSMENT: Right total knee replacement    PLAN: Continue PT process.  Continue ice elevation as necessary.  Follow-up examination 1 month 3 views right knee    Caleb Garcia MD

## 2023-09-25 ENCOUNTER — THERAPY VISIT (OUTPATIENT)
Dept: PHYSICAL THERAPY | Facility: OTHER | Age: 73
End: 2023-09-25
Attending: ORTHOPAEDIC SURGERY
Payer: MEDICARE

## 2023-09-25 DIAGNOSIS — M25.561 RIGHT KNEE PAIN, UNSPECIFIED CHRONICITY: Primary | ICD-10-CM

## 2023-09-25 DIAGNOSIS — Z96.651 S/P TOTAL KNEE ARTHROPLASTY, RIGHT: ICD-10-CM

## 2023-09-25 PROCEDURE — 97016 VASOPNEUMATIC DEVICE THERAPY: CPT | Mod: GP | Performed by: PHYSICAL THERAPIST

## 2023-09-25 PROCEDURE — 97110 THERAPEUTIC EXERCISES: CPT | Mod: GP | Performed by: PHYSICAL THERAPIST

## 2023-09-27 ENCOUNTER — THERAPY VISIT (OUTPATIENT)
Dept: PHYSICAL THERAPY | Facility: OTHER | Age: 73
End: 2023-09-27
Attending: ORTHOPAEDIC SURGERY
Payer: MEDICARE

## 2023-09-27 DIAGNOSIS — M25.561 RIGHT KNEE PAIN, UNSPECIFIED CHRONICITY: Primary | ICD-10-CM

## 2023-09-27 DIAGNOSIS — Z96.651 S/P TOTAL KNEE ARTHROPLASTY, RIGHT: ICD-10-CM

## 2023-09-27 PROCEDURE — 97110 THERAPEUTIC EXERCISES: CPT | Mod: GP | Performed by: PHYSICAL THERAPIST

## 2023-09-27 PROCEDURE — 97016 VASOPNEUMATIC DEVICE THERAPY: CPT | Mod: GP | Performed by: PHYSICAL THERAPIST

## 2023-10-02 ENCOUNTER — THERAPY VISIT (OUTPATIENT)
Dept: PHYSICAL THERAPY | Facility: OTHER | Age: 73
End: 2023-10-02
Attending: ORTHOPAEDIC SURGERY
Payer: MEDICARE

## 2023-10-02 DIAGNOSIS — M25.561 RIGHT KNEE PAIN, UNSPECIFIED CHRONICITY: Primary | ICD-10-CM

## 2023-10-02 DIAGNOSIS — Z96.651 S/P TOTAL KNEE ARTHROPLASTY, RIGHT: ICD-10-CM

## 2023-10-02 PROCEDURE — 97110 THERAPEUTIC EXERCISES: CPT | Mod: GP | Performed by: PHYSICAL THERAPIST

## 2023-10-04 ENCOUNTER — THERAPY VISIT (OUTPATIENT)
Dept: PHYSICAL THERAPY | Facility: OTHER | Age: 73
End: 2023-10-04
Attending: ORTHOPAEDIC SURGERY
Payer: MEDICARE

## 2023-10-04 DIAGNOSIS — Z96.651 S/P TOTAL KNEE ARTHROPLASTY, RIGHT: ICD-10-CM

## 2023-10-04 DIAGNOSIS — M25.561 RIGHT KNEE PAIN: Primary | ICD-10-CM

## 2023-10-04 PROCEDURE — 97110 THERAPEUTIC EXERCISES: CPT | Mod: GP,CQ,KX

## 2023-10-10 ENCOUNTER — THERAPY VISIT (OUTPATIENT)
Dept: PHYSICAL THERAPY | Facility: OTHER | Age: 73
End: 2023-10-10
Attending: PHYSICIAN ASSISTANT
Payer: MEDICARE

## 2023-10-10 DIAGNOSIS — M25.561 RIGHT KNEE PAIN, UNSPECIFIED CHRONICITY: Primary | ICD-10-CM

## 2023-10-10 DIAGNOSIS — Z96.651 S/P TOTAL KNEE ARTHROPLASTY, RIGHT: ICD-10-CM

## 2023-10-10 PROCEDURE — 97140 MANUAL THERAPY 1/> REGIONS: CPT | Mod: GP,KX | Performed by: PHYSICAL THERAPIST

## 2023-10-10 PROCEDURE — 97110 THERAPEUTIC EXERCISES: CPT | Mod: GP | Performed by: PHYSICAL THERAPIST

## 2023-10-12 ENCOUNTER — THERAPY VISIT (OUTPATIENT)
Dept: PHYSICAL THERAPY | Facility: OTHER | Age: 73
End: 2023-10-12
Attending: PHYSICIAN ASSISTANT
Payer: MEDICARE

## 2023-10-12 DIAGNOSIS — Z96.651 S/P TOTAL KNEE ARTHROPLASTY, RIGHT: ICD-10-CM

## 2023-10-12 DIAGNOSIS — M25.561 RIGHT KNEE PAIN, UNSPECIFIED CHRONICITY: Primary | ICD-10-CM

## 2023-10-12 PROCEDURE — 97110 THERAPEUTIC EXERCISES: CPT | Mod: GP | Performed by: PHYSICAL THERAPIST

## 2023-10-12 PROCEDURE — 97140 MANUAL THERAPY 1/> REGIONS: CPT | Mod: GP | Performed by: PHYSICAL THERAPIST

## 2023-10-12 NOTE — PROGRESS NOTES
PLAN  Continue therapy per current plan of care.    Beginning/End Dates of Progress Note Reporting Period:  9/11/2023 to 10/12/2023    Referring Provider:  Caleb Garcia       10/12/23 0500   Appointment Info   Signing clinician's name / credentials Tung Borden, DPT, ATC   Total/Authorized Visits 10   Visits Used 10/10   Medical Diagnosis Chronic pain of right knee (M25.561, G89.29)   PT Tx Diagnosis post-op R TKA   Other pertinent information PN done on 10/12, visit #10   Progress Note/Certification   Start of Care Date 09/11/23   Onset of illness/injury or Date of Surgery 09/05/23   Therapy Frequency 2x/wk   Predicted Duration 12 weeks   Certification date from 09/11/23   Certification date to 12/04/23   Progress Note Completed Date 10/12/23   Supervision   PT Assistant Visit Number 1   GOALS   PT Goals 2;3   PT Goal 1   Goal Identifier ROM   Goal Description Pt to demo R knee AROM 0-110 for improved ADLs & mobility   Rationale to maximize safety and independence with performance of ADLs and functional tasks   Goal Progress currently at 5-100   Target Date 10/23/23   PT Goal 2   Goal Identifier Strength   Goal Description Pt to demo R knee MMT 5/5 flex & ext for improved strength and endurance thus leading to improved ambulating community distances   Rationale to maximize safety and independence with performance of ADLs and functional tasks   Goal Progress currently at 4/5   Target Date 11/20/23   PT Goal 3   Goal Identifier ADLs   Goal Description Pt to return to ADLs including yard work and house work without restrictions or R knee pain   Rationale to maximize safety and independence with performance of ADLs and functional tasks   Goal Progress progressing, not met   Target Date 12/04/23   Subjective Report   Subjective Report most of tightness is more in the backside of his knee, he feels it while walking. Often times R knee feels better than his L during the day   Objective Measure 1   Objective  Measure 5-100   Details R knee ROM ext-flex   Objective Measure 2   Objective Measure 3/10   Details subjective pain level R knee   Vasopneumatic Device   Skilled Intervention NICE machine   Treatment Detail low intermittent compression to R knee, 5/5 cold setting, pt supine with pillow under R LE   Patient Response/Progress positive   Treatment Interventions (PT)   Interventions Manual Therapy   Therapeutic Procedure/Exercise   Therapeutic Procedures: strength, endurance, ROM, flexibillity minutes (63728) 30   Ther Proc 1 - Details Bike, seat 4, 1/2 revs, 10 minutes. (then GT) Prone knee ext stretch with 2# wt on ankle, hold per tolerance x2. Standing gastroc stretch on slant board, hold per pt tolerance x3. MedX leg press 140# 2x20   Skilled Intervention Educ. ROM. Strength   Patient Response/Progress positive   Manual Therapy   Manual Therapy: Mobilization, MFR, MLD, friction massage minutes (09915) 10   Manual Therapy 1 - Details GT3 to anterior R knee, distal quad; pt seated. GT3 to posterior knee, distal hamstring; pt prone.   Skilled Intervention Graston Technique   Education   Learner/Method Patient;Listening;Reading;Demonstration;Pictures/Video;No Barriers to Learning   Plan   Home program knee flexion. knee extension. quad improvement   Comments   Comments R TKA on 9/5 by Dr Garcia   Total Session Time   Timed Code Treatment Minutes 40   Total Treatment Time (sum of timed and untimed services) 40

## 2023-10-17 ENCOUNTER — THERAPY VISIT (OUTPATIENT)
Dept: PHYSICAL THERAPY | Facility: OTHER | Age: 73
End: 2023-10-17
Attending: ORTHOPAEDIC SURGERY
Payer: MEDICARE

## 2023-10-17 DIAGNOSIS — Z96.651 S/P TOTAL KNEE ARTHROPLASTY, RIGHT: ICD-10-CM

## 2023-10-17 DIAGNOSIS — M25.561 RIGHT KNEE PAIN, UNSPECIFIED CHRONICITY: Primary | ICD-10-CM

## 2023-10-17 PROCEDURE — 97140 MANUAL THERAPY 1/> REGIONS: CPT | Mod: GP,KX | Performed by: PHYSICAL THERAPIST

## 2023-10-17 PROCEDURE — 97110 THERAPEUTIC EXERCISES: CPT | Mod: GP,KX | Performed by: PHYSICAL THERAPIST

## 2023-10-23 ENCOUNTER — THERAPY VISIT (OUTPATIENT)
Dept: PHYSICAL THERAPY | Facility: OTHER | Age: 73
End: 2023-10-23
Attending: ORTHOPAEDIC SURGERY
Payer: MEDICARE

## 2023-10-23 DIAGNOSIS — Z96.651 S/P TOTAL KNEE ARTHROPLASTY, RIGHT: ICD-10-CM

## 2023-10-23 DIAGNOSIS — M25.561 RIGHT KNEE PAIN: Primary | ICD-10-CM

## 2023-10-23 PROCEDURE — 97110 THERAPEUTIC EXERCISES: CPT | Mod: GP,CQ,KX

## 2023-10-23 PROCEDURE — 97140 MANUAL THERAPY 1/> REGIONS: CPT | Mod: GP,CQ,KX

## 2023-10-25 ENCOUNTER — THERAPY VISIT (OUTPATIENT)
Dept: PHYSICAL THERAPY | Facility: OTHER | Age: 73
End: 2023-10-25
Attending: ORTHOPAEDIC SURGERY
Payer: MEDICARE

## 2023-10-25 DIAGNOSIS — Z96.651 S/P TOTAL KNEE ARTHROPLASTY, RIGHT: ICD-10-CM

## 2023-10-25 DIAGNOSIS — M25.561 RIGHT KNEE PAIN, UNSPECIFIED CHRONICITY: Primary | ICD-10-CM

## 2023-10-25 PROCEDURE — 97140 MANUAL THERAPY 1/> REGIONS: CPT | Mod: GP,KX | Performed by: PHYSICAL THERAPIST

## 2023-10-25 PROCEDURE — 97110 THERAPEUTIC EXERCISES: CPT | Mod: GP | Performed by: PHYSICAL THERAPIST

## 2023-10-30 ENCOUNTER — THERAPY VISIT (OUTPATIENT)
Dept: PHYSICAL THERAPY | Facility: OTHER | Age: 73
End: 2023-10-30
Attending: ORTHOPAEDIC SURGERY
Payer: MEDICARE

## 2023-10-30 DIAGNOSIS — M25.561 RIGHT KNEE PAIN: Primary | ICD-10-CM

## 2023-10-30 DIAGNOSIS — Z96.651 S/P TOTAL KNEE ARTHROPLASTY, RIGHT: ICD-10-CM

## 2023-10-30 PROCEDURE — 97110 THERAPEUTIC EXERCISES: CPT | Mod: GP,CQ,KX

## 2023-10-30 PROCEDURE — 97140 MANUAL THERAPY 1/> REGIONS: CPT | Mod: GP,CQ,KX

## 2023-10-31 DIAGNOSIS — Z96.651 STATUS POST TOTAL RIGHT KNEE REPLACEMENT: Primary | ICD-10-CM

## 2023-11-01 ENCOUNTER — THERAPY VISIT (OUTPATIENT)
Dept: PHYSICAL THERAPY | Facility: OTHER | Age: 73
End: 2023-11-01
Attending: ORTHOPAEDIC SURGERY
Payer: MEDICARE

## 2023-11-01 ENCOUNTER — OFFICE VISIT (OUTPATIENT)
Dept: ORTHOPEDICS | Facility: OTHER | Age: 73
End: 2023-11-01
Attending: ORTHOPAEDIC SURGERY
Payer: MEDICARE

## 2023-11-01 ENCOUNTER — HOSPITAL ENCOUNTER (OUTPATIENT)
Dept: GENERAL RADIOLOGY | Facility: OTHER | Age: 73
Discharge: HOME | End: 2023-11-01
Attending: ORTHOPAEDIC SURGERY
Payer: MEDICARE

## 2023-11-01 DIAGNOSIS — Z96.651 STATUS POST TOTAL RIGHT KNEE REPLACEMENT: ICD-10-CM

## 2023-11-01 DIAGNOSIS — M25.561 RIGHT KNEE PAIN, UNSPECIFIED CHRONICITY: Primary | ICD-10-CM

## 2023-11-01 DIAGNOSIS — Z96.651 S/P TOTAL KNEE ARTHROPLASTY, RIGHT: ICD-10-CM

## 2023-11-01 DIAGNOSIS — Z96.651 STATUS POST TOTAL RIGHT KNEE REPLACEMENT: Primary | ICD-10-CM

## 2023-11-01 PROCEDURE — G0463 HOSPITAL OUTPT CLINIC VISIT: HCPCS

## 2023-11-01 PROCEDURE — 99024 POSTOP FOLLOW-UP VISIT: CPT | Performed by: ORTHOPAEDIC SURGERY

## 2023-11-01 PROCEDURE — 73562 X-RAY EXAM OF KNEE 3: CPT | Mod: RT

## 2023-11-01 PROCEDURE — 97110 THERAPEUTIC EXERCISES: CPT | Mod: GP,KX | Performed by: PHYSICAL THERAPIST

## 2023-11-01 NOTE — PROGRESS NOTES
SUBJECTIVE:  Patient returns status post right total knee replacement.  Patient is 8 weeks out postsurgery.  Patient reports continued improvements at this time.    ROS: Musculoskeletal and general review of systems are negative, per review of previous clinic questionnaire.  Denies SOB and calf pain.    EXAM: Right knee examination shows motion 0-1 15.  Ligament examination is stable.  Neuro exam intact.    IMAGING: 3 views right knee show patient components to be healing appropriate alignment and position at this current time.    ASSESSMENT: Right total knee replacement doing well    PLAN: Continue physical therapy exercises.  Gradual increase in activities as tolerated.  Follow-up examination at 1 year 3 views right knee.  Patient's had both knees done in the past well, will stop late summer for x-rays.    Caleb Garcia MD

## 2023-11-21 PROBLEM — M25.561 RIGHT KNEE PAIN: Status: RESOLVED | Noted: 2023-09-05 | Resolved: 2023-11-21

## 2023-11-21 PROBLEM — Z96.651 S/P TOTAL KNEE ARTHROPLASTY, RIGHT: Status: RESOLVED | Noted: 2023-09-05 | Resolved: 2023-11-21

## 2023-11-21 NOTE — PROGRESS NOTES
DISCHARGE  Reason for Discharge: Patient has met all goals.  Pt has not returned to physical therapy after a trial of self mgmt techniques indicating that he is doing well with HEP. Please refer to pt's chart for any additional information. This is an unplanned DC    Equipment Issued: none    Discharge Plan: Patient to continue home program.    Referring Provider:  Caleb Garcia

## 2024-01-26 ENCOUNTER — PATIENT OUTREACH (OUTPATIENT)
Dept: GASTROENTEROLOGY | Facility: CLINIC | Age: 74
End: 2024-01-26
Payer: MEDICARE

## 2024-01-26 DIAGNOSIS — Z12.11 SPECIAL SCREENING FOR MALIGNANT NEOPLASMS, COLON: Primary | ICD-10-CM

## 2024-01-26 NOTE — LETTER
1/26/2024         RE: Papo Gonzalez  [address] [facility] [address]      Curt Barros,    We hope this letter finds you doing well.     Your health is important to us at [facility]. Our records indicate that you could be due, or possibly overdue, for a screening or surveillance colonoscopy if you have not had a colonoscopy since 2019.     An order has been placed for you to have this completed. Our scheduling team will be reaching out to you to assist in getting this scheduled. If you do not hear from them within 7 days or you would like to schedule sooner, please call [phone], option 2 for endoscopy scheduling.     If you believe this is in error and have already had a colonoscopy done, please have your results and recommendations faxed to [phone].    If you have questions about this order or have further concerns, please reach out to your primary care provider.     Mehul     Colorectal Cancer RN Screening Team  [facility]

## 2024-01-26 NOTE — PROGRESS NOTES
"Hx adenomatous polyps from 2016 colonoscopy   CRC Screening Colonoscopy Referral Review    Patient meets the inclusion criteria for screening colonoscopy standing order.    Ordering/Referring Provider:  Donya Chavarria    BMI: Estimated body mass index is 36.81 kg/m  as calculated from the following:    Height as of 9/5/23: 1.702 m (5' 7\").    Weight as of 9/5/23: 106.6 kg (235 lb).     Sedation:  Does patient have any of the following conditions affecting sedation?  No medical conditions affecting sedation.    Previous Scopes:  Any previous recommendations or follow up needs based on previous scope?  na / No recommendations.    Medical Concerns to Postpone Order:  Does patient have any of the following medical concerns that should postpone/delay colonoscopy referral?  No medical conditions affecting colonoscopy referral.    Final Referral Details:  Based on patient's medical history patient is appropriate for referral order with moderate sedation. If patient's BMI > 50 do not schedule in ASC.  "

## 2024-02-06 ENCOUNTER — OFFICE VISIT (OUTPATIENT)
Dept: FAMILY MEDICINE | Facility: OTHER | Age: 74
End: 2024-02-06
Attending: PHYSICIAN ASSISTANT
Payer: MEDICARE

## 2024-02-06 VITALS
WEIGHT: 224.8 LBS | DIASTOLIC BLOOD PRESSURE: 80 MMHG | SYSTOLIC BLOOD PRESSURE: 124 MMHG | HEART RATE: 136 BPM | RESPIRATION RATE: 20 BRPM | OXYGEN SATURATION: 96 % | TEMPERATURE: 97.6 F | BODY MASS INDEX: 35.21 KG/M2

## 2024-02-06 DIAGNOSIS — E66.01 CLASS 2 SEVERE OBESITY DUE TO EXCESS CALORIES WITH SERIOUS COMORBIDITY AND BODY MASS INDEX (BMI) OF 35.0 TO 35.9 IN ADULT (H): ICD-10-CM

## 2024-02-06 DIAGNOSIS — J01.90 ACUTE SINUSITIS WITH SYMPTOMS > 10 DAYS: Primary | ICD-10-CM

## 2024-02-06 DIAGNOSIS — E66.812 CLASS 2 SEVERE OBESITY DUE TO EXCESS CALORIES WITH SERIOUS COMORBIDITY AND BODY MASS INDEX (BMI) OF 35.0 TO 35.9 IN ADULT (H): ICD-10-CM

## 2024-02-06 DIAGNOSIS — H61.22 IMPACTED CERUMEN OF LEFT EAR: ICD-10-CM

## 2024-02-06 PROCEDURE — G0463 HOSPITAL OUTPT CLINIC VISIT: HCPCS

## 2024-02-06 PROCEDURE — 99213 OFFICE O/P EST LOW 20 MIN: CPT | Performed by: PHYSICIAN ASSISTANT

## 2024-02-06 RX ORDER — AMOXICILLIN 875 MG
875 TABLET ORAL 2 TIMES DAILY
Qty: 14 TABLET | Refills: 0 | Status: SHIPPED | OUTPATIENT
Start: 2024-02-06 | End: 2024-02-13

## 2024-02-06 ASSESSMENT — PAIN SCALES - GENERAL: PAINLEVEL: NO PAIN (0)

## 2024-02-06 NOTE — NURSING NOTE
Patient here for follow up after sinus infection that he feels is better. Medication Reconciliation: complete.    Veronica Davila LPN  2/6/2024 2:43 PM

## 2024-02-06 NOTE — PATIENT INSTRUCTIONS
You were prescribed an antibiotic, please take into consideration the following information:  - Take entire course of antibiotic even if you start to feel better.  - Antibiotics can cause stomach upset including nausea and diarrhea. Read your bottle or ask the pharmacist if antibiotic can be taken with food to help prevent nausea. If you have symptoms of diarrhea you can take an over-the-counter probiotic and/or increase foods with probiotics such as yogurt, [place], sauerkraut.  -Use caution in sunlight as can lead to increased risk of sunburn while on ABX (antibiotics).     Please refer to your AVS for follow up and pain/symptoms management recommendations (I.e.: medications, helpful conservative treatment modalities, appropriate follow up if need to a specialist or family practice, etc.). Please return to urgent care if your symptoms change or worsen.     Discharge instructions:  -If you were prescribed a medication(s), please take this as prescribed/directed  -Monitor your symptoms, if changing/worsening, return to UC/ER or PCP for follow up    Sinus Infection:   1. Dry out congestion with flonase (1spray in both nostrils 2x daily for 3-5 days) and pseudoephedrine (1-2 tabs every 4-6 hrs for 3-5 days) unless contraindicated     2. Antihistamine such as Claritin or Zyrtec, etc. Generic brands are OK as well.     3. Use a saline spray/Neti Pot/sinus flush (Yaya Med Sinus Rinse) 2-3 times daily to irrigate sinuses/mucosal tissue. This dilutes and moves secretions.     4. Tylenol or ibuprofen for pain and fevers - alternate every 4 hours as needed. I.e.: Ibuprofen at 8am, Tylenol 12pm, Ibuprofen 4pm    -Daily maximum of Tylenol is 4000mg (recommend staying under 3000mg)   -Daily maximum of Ibuprofen is 3200 mg    5. Plenty of fluids and rest as needed.     6. Chew, yawn and speak to help eustachian tubes drain.     * If you are a smoker, try to quit *     - Consider the following over-the-counter products if you are  older than 1 year and not pregnant: honey/chestal for cough relief and sambucus/elderberry for viral upper-respiratory symptoms.

## 2024-02-06 NOTE — PROGRESS NOTES
"  Assessment & Plan       ICD-10-CM    1. Acute sinusitis with symptoms > 10 days  J01.90 amoxicillin (AMOXIL) 875 MG tablet      2. Impacted cerumen of left ear  H61.22       3. Class 2 severe obesity due to excess calories with serious comorbidity and body mass index (BMI) of 35.0 to 35.9 in adult (H)  E66.01     Z68.35         Ongoing rebound sinusitis.  Recommend that patient stop Sudafed as this is likely worsening some of his symptoms.  He does have notable right maxillary sinus tenderness as well as edema.  Will treat with amoxicillin 875 mg by mouth twice daily for 7 days.  Reviewed risk, benefits mental side effects medication.  May trial Flonase, sinus rinses, Netti pot, over-the-counter antihistamine such as Claritin or Zyrtec.  Recommend to abstain from Sudafed as outlined above.  Return precautions reviewed.  Discussed risk, benefits potential side effects of amoxicillin.  Return precautions reviewed.  Impacted cerumen of left ear, nursing team performed ear flush today, 2 large dogs of wax were flushed out.  Patient noted substantial improvement.  May benefit from Debrox drops at home.  Return precautions reviewed.  Class II obesity, BMI 35.21.  Recommend 150 minutes of moderate exercise per week, heart healthy diet including increased lean proteins, fruits and vegetables.  Weight loss would aid cardiopulmonary health.    BMI  Estimated body mass index is 35.21 kg/m  as calculated from the following:    Height as of 9/5/23: 1.702 m (5' 7\").    Weight as of this encounter: 102 kg (224 lb 12.8 oz).   Weight management plan: Discussed healthy diet and exercise guidelines    See Patient Instructions    Return if symptoms worsen or fail to improve.    Subjective   Papo is a 73 year old, presenting for the following health issues:  Follow Up (Long term issues with infection, sinus)        8/22/2023    11:07 AM   Additional Questions   Roomed by Emi SAGASTUME   Accompanied by Self     HPI     Papo presents today with " ongoing sinus infection symptoms, these have been present for approximately 3 weeks.  He states he started the beginning of January, he did a week of Sudafed and symptoms resolved.  After that his symptoms came back with a vengeance, he developed a fever x 3 days, unsure of highest temperature.  He also had diarrhea, decreased appetite, weakness, ongoing sinus pressure and left-sided ear fullness without pain.  He has had purulent nasal drainage as well as postnasal drip for over this timeframe.  No cough or chest congestion.  No sore throat.  He is unsure the highest that his temperature got over that timeframe.  He is also on an additional course of Sudafed x 1 week (total of 2 weeks of use).  He does have some pain in his right upper teeth as well as maxillary sinus    Review of Systems  Constitutional, HEENT, cardiovascular, pulmonary, GI, , musculoskeletal, neuro, skin, endocrine and psych systems are negative, except as otherwise noted.      Objective    /80   Pulse (!) 136   Temp 97.6  F (36.4  C)   Resp 20   Wt 102 kg (224 lb 12.8 oz)   SpO2 96%   BMI 35.21 kg/m    Body mass index is 35.21 kg/m .  Physical Exam   GENERAL: alert and no distress  EYES: Eyes grossly normal to inspection, PERRL and conjunctivae and sclerae normal  HENT: normal cephalic/atraumatic, right ear: normal: no effusions, no erythema, normal landmarks, left ear: occluded with wax, nose and mouth without ulcers or lesions, oropharynx clear, oral mucous membranes moist, sinuses: maxillary tenderness on right, maxillary swelling on right, and + post nasal drip  NECK: no adenopathy, no asymmetry, masses, or scars  RESP: lungs clear to auscultation - no rales, rhonchi or wheezes  CV: regular rate and rhythm, normal S1 S2, no S3 or S4, no murmur, click or rub, no peripheral edema  SKIN: no suspicious lesions or rashes  PSYCH: mentation appears normal, affect normal/bright  LYMPH: normal ant/post cervical, supraclavicular  nodes    Signed Electronically by: Donya Chavarria PA-C    Answers submitted by the patient for this visit:  General Questionnaire (Submitted on 2/1/2024)  Chief Complaint: Chronic problems general questions HPI Form  How many servings of fruits and vegetables do you eat daily?: 2-3  On average, how many sweetened beverages do you drink each day (Examples: soda, juice, sweet tea, etc.  Do NOT count diet or artificially sweetened beverages)?: 0  How many minutes a day do you exercise enough to make your heart beat faster?: 20 to 29  How many days a week do you exercise enough to make your heart beat faster?: 3 or less  How many days per week do you miss taking your medication?: 0  General Concern (Submitted on 2/1/2024)  Chief Complaint: Chronic problems general questions HPI Form  What is the reason for your visit today?: Sinus infection started other issues  When did your symptoms begin?: 1-2 weeks ago  What are your symptoms?: Fever, chills, coughing,sour throat,swollen tongue,lose of bowel control,no appetite,no energy  How would you describe these symptoms?: Severe  Are your symptoms:: Improving  Have you had these symptoms before?: No  Is there anything that makes you feel worse?: Reading gives me a headache  Is there anything that makes you feel better?: Drinking and moving around

## 2024-02-13 ENCOUNTER — MYC MEDICAL ADVICE (OUTPATIENT)
Dept: FAMILY MEDICINE | Facility: OTHER | Age: 74
End: 2024-02-13
Payer: MEDICARE

## 2024-02-13 ENCOUNTER — TELEPHONE (OUTPATIENT)
Dept: FAMILY MEDICINE | Facility: OTHER | Age: 74
End: 2024-02-13
Payer: MEDICARE

## 2024-02-13 NOTE — TELEPHONE ENCOUNTER
Patient reports he also had a sore throat at the end of January.  Patient would prefer a reply back on [facility] versus telephone call.      Clara Montes De Oca LPN 2/13/2024 2:49 PM

## 2024-02-13 NOTE — TELEPHONE ENCOUNTER
CLL-patient and his wife have questions about recent sickness and weather it was Legionnaire's disease     Please call and advise    Thank You    Christiane Witt on 2/13/2024 at 9:35 AM

## 2024-02-21 ENCOUNTER — HOSPITAL ENCOUNTER (OUTPATIENT)
Dept: GENERAL RADIOLOGY | Facility: OTHER | Age: 74
Discharge: HOME | End: 2024-02-21
Attending: ORTHOPAEDIC SURGERY
Payer: MEDICARE

## 2024-02-21 ENCOUNTER — OFFICE VISIT (OUTPATIENT)
Dept: ORTHOPEDICS | Facility: OTHER | Age: 74
End: 2024-02-21
Attending: ORTHOPAEDIC SURGERY
Payer: MEDICARE

## 2024-02-21 DIAGNOSIS — Z96.652 STATUS POST TOTAL LEFT KNEE REPLACEMENT: ICD-10-CM

## 2024-02-21 DIAGNOSIS — Z96.651 STATUS POST TOTAL RIGHT KNEE REPLACEMENT: Primary | ICD-10-CM

## 2024-02-21 PROCEDURE — 73560 X-RAY EXAM OF KNEE 1 OR 2: CPT | Mod: RT

## 2024-02-21 PROCEDURE — 99213 OFFICE O/P EST LOW 20 MIN: CPT | Performed by: ORTHOPAEDIC SURGERY

## 2024-02-21 PROCEDURE — G0463 HOSPITAL OUTPT CLINIC VISIT: HCPCS

## 2024-02-21 NOTE — PROGRESS NOTES
SUBJECTIVE:  Patient returns status post bilateral knee replacement.  Right side being done in September 2023 and left side being done in March 2023.  Patient ports he did have an upper respiratory flexion questions whether or not that was legionnaires [place] [facility]  He did report a bit of a setback in regards to where his right knee is at secondary to that.  Patient did want to have things checked out and evaluated at this time.    ROS: Musculoskeletal and general review of systems are negative, per review of previous clinic questionnaire.  Denies SOB and calf pain.    EXAM: Left knee examination shows 0 to 125 degrees.  Ligament examination stable.  Minimal swelling noted there as well.  Right knee examination shows motion -2 or 3 to about 115.  There is more swelling diffusely about that knee comparison the contralateral side.  Ligament examination is otherwise stable.  Quad strength appears to be equal bilaterally.    IMAGING: Left knee x-rays 3 views show patient's components to be healing in appropriate alignment and position at this current time.    ASSESSMENT: Bilateral total knee replacement right side being done more recently than left.    PLAN: Continue physical therapy regimen on own basis.  Anticipate continued improvements in regards to flexibility strength and swelling on the right knee over time.  We will plan for a recheck of the right knee 3 views in September to ensure continued healing of his components as well as soft tissues.    Caleb Garcia MD

## 2024-02-24 NOTE — PROGRESS NOTES
06/02/23 0500   Appointment Info   Signing clinician's name / credentials Roxy Treviño, PT   Visits Used 18   Medical Diagnosis Z96.652 (ICD-10-CM) - Status post total left knee replacement   PT Tx Diagnosis L knee pain   Precautions/Limitations none   Quick Adds Certification   Progress Note/Certification   Start of Care Date 03/31/23   Onset of illness/injury or Date of Surgery 03/21/23   Therapy Frequency 2x week   Predicted Duration 5 weeks   Certification date from 03/31/23   Certification date to 06/23/23   Progress Note Completed Date 05/02/23   Supervision   PT Assistant Visit Number 1   GOALS   PT Goals 2;3   PT Goal 1   Goal Identifier ROM   Goal Description Patient will increase L knee ROM to 5-110 degrees for functional ROM within 8 weeks   Goal Progress L knee ROM 5-112   Target Date 05/12/23   Date Met 06/02/23   PT Goal 2   Goal Identifier strength   Goal Description Patient will increase knee flexion/extension strength to within 5/5 MMT to increased knee stability with gait and ADLs within 6 weeks   Goal Progress 5/5 MMT knee flexion/extension   Target Date 05/12/23   Date Met 05/02/23   PT Goal 3   Goal Identifier HEP   Goal Description Patient will be independent with HEP and and appropriate progressions within 6 weeks   Goal Progress completes HEP 2x daily   Target Date 05/12/23   Date Met 05/02/23   Subjective Report   Subjective Report Patient reports L knee continues to feel stiff after a full day of activity. Reports he still does stretches but not as many exercises -- reports stairs are getting easier. Reports he has had to take pain medication (for R knee pain) more in the past two months than he ever has.   Objective Measures   Objective Measures Objective Measure 1;Objective Measure 2   Objective Measure 1   Objective Measure Pain   Details no pain reported, just stiffness   Objective Measure 2   Objective Measure ROM   Details ROM 5-112 deg   PT Modalities   PT Modalities Vasopneumatic  [place] ED to IP Report (EDIP)    Mental Status     Alert and oriented    Safety     None    Tele  Yes    Oxygen Needs  2 LPM supplemental oxygen    Mobility    Independent    Protocols  None    ISAR / Screening   ISAR     Age Greater than 65 No    Isolation  None    Additional Information:    "device   Vasopneumatic Device   Skilled Intervention ice/compression for edema, pain and inflammation   Treatment Detail Patient placed on mat table with B LEs elevated; device around L knee/thigh and lower leg with moderate pressure at 34 degrees for 10 minutes.   Patient Response/Progress good   Treatment Interventions (PT)   Interventions Therapeutic Procedure/Exercise   Therapeutic Procedure/Exercise   Therapeutic Procedures: strength, endurance, ROM, flexibillity minutes (68216) 45   Ther Proc 1 Nustep seat at 7 for ROM, L4 x5 min;  standing calf stretch on inverted board x30 seconds x2 reps; ; step down 6\"step x10 reps trialed 4\" step with improved alignement and less assist;  Stair training improved control going down 1 flight. MedX: leg press x20 160#; leg press single leg x20 reps B 80#, # x25, hip abd 70# x25; quad B LEs 70# x20 reps;  DEFERRED remaining today due to time:  seated at elevated mat table and use R LE to push into flexion x20 seconds x2 rep standing hip exetnsion x10 reps B; standing hip abduction x10 reps B;   LAQ seated x10 reps x2 set; supine SAQ x10 w/slow lowering;   Skilled Intervention education, stretching, strengthening   Patient Response/Progress stair climbing - patient able to perform reciprocally ascending/descending -- today has difficulty with weight shift to the R and descending on the the R   Education   Learner/Method Patient   Education Comments as above - continued focus on increasing knee ROM through stretches/frequent ROM tasks   Plan   Homework HEP   Home program heel slides, ankle pumps, SLR, standing hip extension, standing hip abduction, LAQ, supine hamstring stretch   Plan for next session patient discharged from formal PT this date. Encouraged to continue stretches/strengthening exercises and pre-hab for eventual R TKA.  (post-op 3/21/2023)   Total Session Time   Timed Code Treatment Minutes 45   Total Treatment Time (sum of timed and untimed services) 45 "   Medicare Claim Information   Medical Diagnosis Z96.652 (ICD-10-CM) - Status post total left knee replacement   PT Diagnosis L knee pain   Start of Care Date 03/31/23   Onset date of current episode/exacerbation 03/21/23   Certification date from 03/31/23         DISCHARGE  Reason for Discharge: Patient has met all goals.  No further expectation of progress.    Equipment Issued: GTB    Discharge Plan: Patient to continue home program. Perform exercises on R knee for expected R TKA in the future as well.    Referring Provider:  Caleb Garcia MD

## 2024-03-09 ENCOUNTER — HEALTH MAINTENANCE LETTER (OUTPATIENT)
Age: 74
End: 2024-03-09

## 2024-05-21 ENCOUNTER — OFFICE VISIT (OUTPATIENT)
Dept: FAMILY MEDICINE | Facility: OTHER | Age: 74
End: 2024-05-21
Attending: PHYSICIAN ASSISTANT
Payer: MEDICARE

## 2024-05-21 VITALS
TEMPERATURE: 97.5 F | HEART RATE: 87 BPM | OXYGEN SATURATION: 99 % | RESPIRATION RATE: 20 BRPM | SYSTOLIC BLOOD PRESSURE: 158 MMHG | DIASTOLIC BLOOD PRESSURE: 88 MMHG | BODY MASS INDEX: 36.27 KG/M2 | WEIGHT: 231.6 LBS

## 2024-05-21 DIAGNOSIS — Z01.89 PATIENT REQUEST FOR DIAGNOSTIC TESTING: ICD-10-CM

## 2024-05-21 DIAGNOSIS — R68.89 SUSPECTED LEGIONELLA PNEUMONIA: ICD-10-CM

## 2024-05-21 DIAGNOSIS — E66.812 CLASS 2 SEVERE OBESITY DUE TO EXCESS CALORIES WITH SERIOUS COMORBIDITY AND BODY MASS INDEX (BMI) OF 36.0 TO 36.9 IN ADULT (H): ICD-10-CM

## 2024-05-21 DIAGNOSIS — R82.90 MALODOROUS URINE: Primary | ICD-10-CM

## 2024-05-21 DIAGNOSIS — E66.01 CLASS 2 SEVERE OBESITY DUE TO EXCESS CALORIES WITH SERIOUS COMORBIDITY AND BODY MASS INDEX (BMI) OF 36.0 TO 36.9 IN ADULT (H): ICD-10-CM

## 2024-05-21 DIAGNOSIS — R03.0 ELEVATED BLOOD PRESSURE READING WITHOUT DIAGNOSIS OF HYPERTENSION: ICD-10-CM

## 2024-05-21 LAB
ALBUMIN UR-MCNC: NEGATIVE MG/DL
APPEARANCE UR: CLEAR
BILIRUB UR QL STRIP: NEGATIVE
COLOR UR AUTO: ABNORMAL
GLUCOSE UR STRIP-MCNC: NEGATIVE MG/DL
HGB UR QL STRIP: ABNORMAL
KETONES UR STRIP-MCNC: NEGATIVE MG/DL
L PNEUMO1 AG UR QL IA: NEGATIVE
LEUKOCYTE ESTERASE UR QL STRIP: ABNORMAL
MUCOUS THREADS #/AREA URNS LPF: PRESENT /LPF
NITRATE UR QL: NEGATIVE
PH UR STRIP: 5.5 [PH] (ref 5–9)
RBC URINE: 5 /HPF
SP GR UR STRIP: 1.01 (ref 1–1.03)
UROBILINOGEN UR STRIP-MCNC: NORMAL MG/DL
WBC URINE: 8 /HPF

## 2024-05-21 PROCEDURE — 87899 AGENT NOS ASSAY W/OPTIC: CPT | Mod: ZL | Performed by: PHYSICIAN ASSISTANT

## 2024-05-21 PROCEDURE — 87086 URINE CULTURE/COLONY COUNT: CPT | Mod: ZL | Performed by: PHYSICIAN ASSISTANT

## 2024-05-21 PROCEDURE — 99213 OFFICE O/P EST LOW 20 MIN: CPT | Performed by: PHYSICIAN ASSISTANT

## 2024-05-21 PROCEDURE — G0463 HOSPITAL OUTPT CLINIC VISIT: HCPCS

## 2024-05-21 PROCEDURE — G2211 COMPLEX E/M VISIT ADD ON: HCPCS | Performed by: PHYSICIAN ASSISTANT

## 2024-05-21 PROCEDURE — G0463 HOSPITAL OUTPT CLINIC VISIT: HCPCS | Mod: 25

## 2024-05-21 PROCEDURE — 81001 URINALYSIS AUTO W/SCOPE: CPT | Mod: ZL | Performed by: PHYSICIAN ASSISTANT

## 2024-05-21 ASSESSMENT — PAIN SCALES - GENERAL: PAINLEVEL: MILD PAIN (3)

## 2024-05-21 NOTE — NURSING NOTE
Patient here for concerns with urine. He says there is a smell to the urine. Medication Reconciliation: complete.    Veronica Davila LPN  5/21/2024 12:30 PM

## 2024-05-21 NOTE — PATIENT INSTRUCTIONS
Legionella urine testing to return in 3-7 days  Urine sample to check for urinary infection to return this afternoon

## 2024-05-21 NOTE — PROGRESS NOTES
Assessment & Plan       ICD-10-CM    1. Malodorous urine  R82.90 UA Macroscopic with reflex to Microscopic and Culture     Urine Culture      2. Suspected Legionella pneumonia  R68.89 Legionella pneumophila antigen urine      3. Patient request for diagnostic testing  Z01.89 Legionella pneumophila antigen urine      4. Elevated blood pressure reading without diagnosis of hypertension  R03.0       5. Class 2 severe obesity due to excess calories with serious comorbidity and body mass index (BMI) of 36.0 to 36.9 in adult (H)  E66.01     Z68.36         Malodorous urine, I do have low suspicion for Legionella.  We did perform a urinalysis today, this showed trace blood, moderate leukocyte esterase, mucus, 8 white blood cells and 5 red blood cells.  I added on a urine culture as urine was suspicious for possible infection, urine culture will return in the next 1 to 3 days.  Will await urine culture results prior to initiation of antibiotics. If urinalysis is negative for bacterial growth, antibiotics are not indicated.  Recommend increase fluids, limit bladder irritants (acidic, spicy, greasy foods, caffeine, etc.).  Patient request for Legionella testing, low medical suspicion but will rule this out per request.  Urinalysis obtained today for Legionella, Papo is aware that this will likely take anywhere from 1 to 7 days to return.  I will update him on results and recommendations as available.  As I have low suspicion for this, oral antibiotics not indicated unless there is a positive test result.  Request for Legionella testing, see #2.  Elevated blood pressure today, attributed to caffeine use and nervousness in regards to office visit.  Recommend low-salt diet, closely monitoring blood pressures at home.  If persistently high, may benefit from starting hypertensive regimen.  Class II obesity, weight loss would aid cardiopulmonary health.  Recommend increase lean proteins, fruits and vegetables.  Goal exercise 150  minutes of moderate exercise per week.    See Patient Instructions    No follow-ups on file.    Subjective   Papo is a 74 year old, presenting for the following health issues:  UTI        8/22/2023    11:07 AM   Additional Questions   Roomed by Emi SAGASTUME   Accompanied by Self     History of Present Illness       Reason for visit:  Possible urniary track infection  Symptom onset:  More than a month  Symptoms include:  Urine smells like sewage and a brown sensitive smelly spot appeared on left arm pit (spot is gone now).  Symptom intensity:  Mild  Symptom progression:  Staying the same  Had these symptoms before:  No  What makes it worse:  No  What makes it better:  No    He eats 2-3 servings of fruits and vegetables daily.He consumes 0 sweetened beverage(s) daily.He exercises with enough effort to increase his heart rate 30 to 60 minutes per day.  He exercises with enough effort to increase his heart rate 4 days per week.   He is taking medications regularly.     Papo presents to clinic today with concerns of infection.  I saw him last in February 2024, at that time he had 3 weeks of sinusitis symptoms, he had trialed Sudafed and his symptoms came back with a vengeance, he also had a fever, diarrhea, weakness, decreased appetite and overall feeling to the weather.  We started him on a 7-day course of oral amoxicillin (875 mg).  He states that his symptoms did improve, today, he expresses concerns of possible Legionella due to the outbreak in [place] within the last year.  He states that he is concerned that his symptoms started in January 2024 or possibly due to this.  He states he has been doing some additional research on the Internet, he did have a spot on his left arm that was a 2 inch [place] that was darker brown/black in coloration, smelled and was sore, this resolved after 4 to 5 days of aloe vera use.  He states that on the Internet this was consistent with Legionella.  In the last few weeks he has also  developed malodorous urine (states his urine smells like  water).  He declines any cloudiness/murkiness the urine and or hematuria.  He declines any flank pain or low back pain.  He declines any urinary urgency or frequency.  No fevers, chills, chest pain, shortness of breath, centralized or peripheral edema.  No cough or congestion.  No headaches.  No additional symptoms to report.  As noted above, other than urinary concerns, he is completely asymptomatic at this time.    Review of Systems  Constitutional, HEENT, cardiovascular, pulmonary, GI, , musculoskeletal, neuro, skin, endocrine and psych systems are negative, except as otherwise noted.      Objective    BP (!) 158/88   Pulse 87   Temp 97.5  F (36.4  C)   Resp 20   Wt 105.1 kg (231 lb 9.6 oz)   SpO2 99%   BMI 36.27 kg/m    Body mass index is 36.27 kg/m .  Physical Exam   GENERAL: alert and no distress  EYES: Eyes grossly normal to inspection, PERRL and conjunctivae and sclerae normal  HENT: ear canals and TM's normal, nose and mouth without ulcers or lesions  NECK: no adenopathy, no asymmetry, masses, or scars  RESP: lungs clear to auscultation - no rales, rhonchi or wheezes  CV: regular rate and rhythm, normal S1 S2, no S3 or S4, no murmur, click or rub, no peripheral edema  ABDOMEN: soft, nontender, no hepatosplenomegaly, no masses and bowel sounds normal  MS: no gross musculoskeletal defects noted, no edema  SKIN: no suspicious lesions or rashes  PSYCH: mentation appears normal, affect normal/bright  LYMPH: normal ant/post cervical, supraclavicular nodes    Results for orders placed or performed in visit on 05/21/24   UA Macroscopic with reflex to Microscopic and Culture     Status: Abnormal    Specimen: Urine, Midstream   Result Value Ref Range    Color Urine Light Yellow Colorless, Straw, Light Yellow, Yellow    Appearance Urine Clear Clear    Glucose Urine Negative Negative mg/dL    Bilirubin Urine Negative Negative    Ketones Urine  Negative Negative mg/dL    Specific Gravity Urine 1.010 1.000 - 1.030    Blood Urine Trace (A) Negative    pH Urine 5.5 5.0 - 9.0    Protein Albumin Urine Negative Negative mg/dL    Urobilinogen Urine Normal Normal, 2.0 mg/dL    Nitrite Urine Negative Negative    Leukocyte Esterase Urine Moderate (A) Negative    Mucus Urine Present (A) None Seen /LPF    RBC Urine 5 (H) <=2 /HPF    WBC Urine 8 (H) <=5 /HPF    Narrative    Urine Culture ordered based on laboratory criteria     Signed Electronically by: Donya Chavarria PA-C    The longitudinal plan of care for the diagnosis(es)/condition(s) as documented were addressed during this visit. Due to the added complexity in care, I will continue to support Papo in the subsequent management and with ongoing continuity of care.

## 2024-05-23 LAB — BACTERIA UR CULT: NO GROWTH

## 2024-06-13 SDOH — HEALTH STABILITY: PHYSICAL HEALTH: ON AVERAGE, HOW MANY DAYS PER WEEK DO YOU ENGAGE IN MODERATE TO STRENUOUS EXERCISE (LIKE A BRISK WALK)?: 6 DAYS

## 2024-06-13 SDOH — HEALTH STABILITY: PHYSICAL HEALTH: ON AVERAGE, HOW MANY MINUTES DO YOU ENGAGE IN EXERCISE AT THIS LEVEL?: 30 MIN

## 2024-06-13 ASSESSMENT — SOCIAL DETERMINANTS OF HEALTH (SDOH): HOW OFTEN DO YOU GET TOGETHER WITH FRIENDS OR RELATIVES?: ONCE A WEEK

## 2024-06-18 ENCOUNTER — OFFICE VISIT (OUTPATIENT)
Dept: FAMILY MEDICINE | Facility: OTHER | Age: 74
End: 2024-06-18
Attending: PHYSICIAN ASSISTANT
Payer: MEDICARE

## 2024-06-18 VITALS
WEIGHT: 227 LBS | SYSTOLIC BLOOD PRESSURE: 138 MMHG | HEIGHT: 67 IN | BODY MASS INDEX: 35.63 KG/M2 | RESPIRATION RATE: 16 BRPM | HEART RATE: 84 BPM | DIASTOLIC BLOOD PRESSURE: 86 MMHG | TEMPERATURE: 97.2 F

## 2024-06-18 DIAGNOSIS — N18.31 STAGE 3A CHRONIC KIDNEY DISEASE (H): ICD-10-CM

## 2024-06-18 DIAGNOSIS — E66.812 CLASS 2 SEVERE OBESITY DUE TO EXCESS CALORIES WITH SERIOUS COMORBIDITY AND BODY MASS INDEX (BMI) OF 35.0 TO 35.9 IN ADULT (H): ICD-10-CM

## 2024-06-18 DIAGNOSIS — E66.01 CLASS 2 SEVERE OBESITY DUE TO EXCESS CALORIES WITH SERIOUS COMORBIDITY AND BODY MASS INDEX (BMI) OF 35.0 TO 35.9 IN ADULT (H): ICD-10-CM

## 2024-06-18 DIAGNOSIS — Z71.85 VACCINE COUNSELING: ICD-10-CM

## 2024-06-18 DIAGNOSIS — N40.1 BENIGN PROSTATIC HYPERPLASIA WITH URINARY RETENTION: ICD-10-CM

## 2024-06-18 DIAGNOSIS — Z83.3 FAMILY HISTORY OF DIABETES MELLITUS: ICD-10-CM

## 2024-06-18 DIAGNOSIS — Z13.220 LIPID SCREENING: ICD-10-CM

## 2024-06-18 DIAGNOSIS — Z00.00 ENCOUNTER FOR MEDICARE ANNUAL WELLNESS EXAM: Primary | ICD-10-CM

## 2024-06-18 DIAGNOSIS — N52.9 ERECTILE DYSFUNCTION, UNSPECIFIED ERECTILE DYSFUNCTION TYPE: ICD-10-CM

## 2024-06-18 DIAGNOSIS — Z12.11 COLON CANCER SCREENING: ICD-10-CM

## 2024-06-18 DIAGNOSIS — Z12.5 SCREENING FOR PROSTATE CANCER: ICD-10-CM

## 2024-06-18 DIAGNOSIS — R33.8 BENIGN PROSTATIC HYPERPLASIA WITH URINARY RETENTION: ICD-10-CM

## 2024-06-18 DIAGNOSIS — R73.03 PREDIABETES: ICD-10-CM

## 2024-06-18 DIAGNOSIS — H61.23 BILATERAL IMPACTED CERUMEN: ICD-10-CM

## 2024-06-18 DIAGNOSIS — E78.2 MIXED HYPERLIPIDEMIA: ICD-10-CM

## 2024-06-18 LAB
ANION GAP SERPL CALCULATED.3IONS-SCNC: 9 MMOL/L (ref 7–15)
BASOPHILS # BLD AUTO: 0 10E3/UL (ref 0–0.2)
BASOPHILS NFR BLD AUTO: 1 %
BUN SERPL-MCNC: 15.2 MG/DL (ref 8–23)
CALCIUM SERPL-MCNC: 9.5 MG/DL (ref 8.8–10.2)
CHLORIDE SERPL-SCNC: 105 MMOL/L (ref 98–107)
CHOLEST SERPL-MCNC: 187 MG/DL
CREAT SERPL-MCNC: 1.3 MG/DL (ref 0.67–1.17)
CREAT UR-MCNC: 170.9 MG/DL
DEPRECATED HCO3 PLAS-SCNC: 27 MMOL/L (ref 22–29)
EGFRCR SERPLBLD CKD-EPI 2021: 58 ML/MIN/1.73M2
EOSINOPHIL # BLD AUTO: 0.2 10E3/UL (ref 0–0.7)
EOSINOPHIL NFR BLD AUTO: 3 %
ERYTHROCYTE [DISTWIDTH] IN BLOOD BY AUTOMATED COUNT: 12.3 % (ref 10–15)
FASTING STATUS PATIENT QL REPORTED: YES
FASTING STATUS PATIENT QL REPORTED: YES
GLUCOSE SERPL-MCNC: 109 MG/DL (ref 70–99)
HBA1C MFR BLD: 5.7 % (ref 4–6.2)
HCT VFR BLD AUTO: 48.8 % (ref 40–53)
HDLC SERPL-MCNC: 44 MG/DL
HGB BLD-MCNC: 16.2 G/DL (ref 13.3–17.7)
IMM GRANULOCYTES # BLD: 0 10E3/UL
IMM GRANULOCYTES NFR BLD: 0 %
LDLC SERPL CALC-MCNC: 117 MG/DL
LYMPHOCYTES # BLD AUTO: 2.8 10E3/UL (ref 0.8–5.3)
LYMPHOCYTES NFR BLD AUTO: 40 %
MCH RBC QN AUTO: 31.3 PG (ref 26.5–33)
MCHC RBC AUTO-ENTMCNC: 33.2 G/DL (ref 31.5–36.5)
MCV RBC AUTO: 94 FL (ref 78–100)
MICROALBUMIN UR-MCNC: <12 MG/L
MICROALBUMIN/CREAT UR: NORMAL MG/G{CREAT}
MONOCYTES # BLD AUTO: 0.6 10E3/UL (ref 0–1.3)
MONOCYTES NFR BLD AUTO: 9 %
NEUTROPHILS # BLD AUTO: 3.3 10E3/UL (ref 1.6–8.3)
NEUTROPHILS NFR BLD AUTO: 48 %
NONHDLC SERPL-MCNC: 143 MG/DL
NRBC # BLD AUTO: 0 10E3/UL
NRBC BLD AUTO-RTO: 0 /100
PLATELET # BLD AUTO: 207 10E3/UL (ref 150–450)
POTASSIUM SERPL-SCNC: 5 MMOL/L (ref 3.4–5.3)
PSA SERPL DL<=0.01 NG/ML-MCNC: 3.16 NG/ML (ref 0–6.5)
RBC # BLD AUTO: 5.18 10E6/UL (ref 4.4–5.9)
SODIUM SERPL-SCNC: 141 MMOL/L (ref 135–145)
TRIGL SERPL-MCNC: 129 MG/DL
WBC # BLD AUTO: 7 10E3/UL (ref 4–11)

## 2024-06-18 PROCEDURE — G0463 HOSPITAL OUTPT CLINIC VISIT: HCPCS

## 2024-06-18 PROCEDURE — G0439 PPPS, SUBSEQ VISIT: HCPCS | Performed by: PHYSICIAN ASSISTANT

## 2024-06-18 PROCEDURE — 83036 HEMOGLOBIN GLYCOSYLATED A1C: CPT | Mod: ZL | Performed by: PHYSICIAN ASSISTANT

## 2024-06-18 PROCEDURE — 99214 OFFICE O/P EST MOD 30 MIN: CPT | Mod: 25 | Performed by: PHYSICIAN ASSISTANT

## 2024-06-18 PROCEDURE — 80061 LIPID PANEL: CPT | Mod: ZL | Performed by: PHYSICIAN ASSISTANT

## 2024-06-18 PROCEDURE — 85048 AUTOMATED LEUKOCYTE COUNT: CPT | Mod: ZL | Performed by: PHYSICIAN ASSISTANT

## 2024-06-18 PROCEDURE — G0103 PSA SCREENING: HCPCS | Mod: ZL | Performed by: PHYSICIAN ASSISTANT

## 2024-06-18 PROCEDURE — 36415 COLL VENOUS BLD VENIPUNCTURE: CPT | Mod: ZL | Performed by: PHYSICIAN ASSISTANT

## 2024-06-18 PROCEDURE — 82570 ASSAY OF URINE CREATININE: CPT | Mod: ZL | Performed by: PHYSICIAN ASSISTANT

## 2024-06-18 PROCEDURE — 80048 BASIC METABOLIC PNL TOTAL CA: CPT | Mod: ZL | Performed by: PHYSICIAN ASSISTANT

## 2024-06-18 RX ORDER — SILDENAFIL 50 MG/1
TABLET, FILM COATED ORAL
Qty: 12 TABLET | Refills: 3 | Status: SHIPPED | OUTPATIENT
Start: 2024-06-18

## 2024-06-18 RX ORDER — TAMSULOSIN HYDROCHLORIDE 0.4 MG/1
0.4 CAPSULE ORAL DAILY
Qty: 90 CAPSULE | Refills: 4 | Status: SHIPPED | OUTPATIENT
Start: 2024-06-18

## 2024-06-18 ASSESSMENT — ANXIETY QUESTIONNAIRES
GAD7 TOTAL SCORE: 0
7. FEELING AFRAID AS IF SOMETHING AWFUL MIGHT HAPPEN: NOT AT ALL
7. FEELING AFRAID AS IF SOMETHING AWFUL MIGHT HAPPEN: NOT AT ALL
GAD7 TOTAL SCORE: 0
4. TROUBLE RELAXING: NOT AT ALL
2. NOT BEING ABLE TO STOP OR CONTROL WORRYING: NOT AT ALL
1. FEELING NERVOUS, ANXIOUS, OR ON EDGE: NOT AT ALL
5. BEING SO RESTLESS THAT IT IS HARD TO SIT STILL: NOT AT ALL
3. WORRYING TOO MUCH ABOUT DIFFERENT THINGS: NOT AT ALL
6. BECOMING EASILY ANNOYED OR IRRITABLE: NOT AT ALL
GAD7 TOTAL SCORE: 0

## 2024-06-18 ASSESSMENT — PAIN SCALES - GENERAL: PAINLEVEL: NO PAIN (1)

## 2024-06-18 NOTE — LETTER
June 18, 2024      Papo Gonzalez  [address]  [place] [address]        Dear ,    We are writing to inform you of your test results.    CBC - Blood counts look great.   Microalbumin urine test (kidney function) is normal  PSA (prostate cancer screening lab) is normal   Kidney function - GFR and creatinine have decreased slightly since last year. Continue to try to minimally use NSAIDS (Ibuprofen, Naproxen, Aleve). Low salt diet recommended as well.     Cholesterol - HDL (good heart cholesterol) and triglycerides have improved since last year. LDL cholesterol and overall cholesterol are nearly identical. Options would be to start a cholesterol medication or continue to try to work hard on lifestyle modifications. Recommend increased lean proteins, fruits and vegetable intake. Goal exercise 150 minutes of moderate exercise per week (walking is great exercise).    A1c has improved, last year, this was 5.9%, today, this is 5.7% (mild prediabetes - I would recommend lifestyle modifications as outlined below for treatment).     Hemoglobin A1c is a test for prediabetes and diabetes.  The normal range is up to 5.6%.    Between 5.7 and 6.4% indicates prediabetes.  6.5% and higher indicates Diabetes.     I would recommend a focus on weight loss and increased exercise with a goal of 30 minutes of exercise at least 5 times per week in order to help prevent worsening of your blood sugar control.     To help with weight loss and improve blood sugar control....     -- Try to avoid Carbohydrates as much as possible -- breads, pasta, baked goods, cakes, oatmeal, cold cereal, potatoes.  These are turned to sugar in one metabolic conversion, cause insulin secretion and increased fat deposition / weight gain.       -- Eat more lean meats, proteins, eggs, nuts, vegetables.      Resulted Orders   Basic Metabolic Panel   Result Value Ref Range    Sodium 141 135 - 145 mmol/L      Comment:      Reference intervals for  this test were updated on 09/26/2023 to more accurately reflect our healthy population. There may be differences in the flagging of prior results with similar values performed with this method. Interpretation of those prior results can be made in the context of the updated reference intervals.     Potassium 5.0 3.4 - 5.3 mmol/L    Chloride 105 98 - 107 mmol/L    Carbon Dioxide (CO2) 27 22 - 29 mmol/L    Anion Gap 9 7 - 15 mmol/L    Urea Nitrogen 15.2 8.0 - 23.0 mg/dL    Creatinine 1.30 (H) 0.67 - 1.17 mg/dL    GFR Estimate 58 (L) >60 mL/min/1.73m2      Comment:      eGFR calculated using 2021 CKD-EPI equation.    Calcium 9.5 8.8 - 10.2 mg/dL    Glucose 109 (H) 70 - 99 mg/dL    Patient Fasting > 8hrs? Yes    Lipid Panel   Result Value Ref Range    Cholesterol 187 <200 mg/dL    Triglycerides 129 <150 mg/dL    Direct Measure HDL 44 >=40 mg/dL    LDL Cholesterol Calculated 117 (H) <=100 mg/dL    Non HDL Cholesterol 143 (H) <130 mg/dL    Patient Fasting > 8hrs? Yes     Narrative    Cholesterol  Desirable:  <200 mg/dL    Triglycerides  Normal:  Less than 150 mg/dL  Borderline High:  150-199 mg/dL  High:  200-499 mg/dL  Very High:  Greater than or equal to 500 mg/dL    Direct Measure HDL  Female:  Greater than or equal to 50 mg/dL   Male:  Greater than or equal to 40 mg/dL    LDL Cholesterol  Desirable:  <100mg/dL  Above Desirable:  100-129 mg/dL   Borderline High:  130-159 mg/dL   High:  160-189 mg/dL   Very High:  >= 190 mg/dL    Non HDL Cholesterol  Desirable:  130 mg/dL  Above Desirable:  130-159 mg/dL  Borderline High:  160-189 mg/dL  High:  190-219 mg/dL  Very High:  Greater than or equal to 220 mg/dL   PSA Screen GH   Result Value Ref Range    Prostate Specific Antigen Screen 3.16 0.00 - 6.50 ng/mL    Narrative    This result is obtained using the Roche Elecsys total PSA method on the maria ines e601 immunoassay analyzer. Results obtained with different assay methods or kits cannot be used interchangeably.   Hemoglobin  A1c   Result Value Ref Range    Hemoglobin A1C 5.7 4.0 - 6.2 %   Albumin Random Urine Quantitative with Creat Ratio   Result Value Ref Range    Creatinine Urine mg/dL 170.9 mg/dL      Comment:      The reference ranges have not been established in urine creatinine. The results should be integrated into the clinical context for interpretation.    Albumin Urine mg/L <12.0 mg/L      Comment:      The reference ranges have not been established in urine albumin. The results should be integrated into the clinical context for interpretation.    Albumin Urine mg/g Cr        Comment:      Unable to calculate, urine albumin and/or urine creatinine is outside detectable limits.  Microalbuminuria is defined as an albumin:creatinine ratio of 17 to 299 for males and 25 to 299 for females. A ratio of albumin:creatinine of 300 or higher is indicative of overt proteinuria.  Due to biologic variability, positive results should be confirmed by a second, first-morning random or 24-hour timed urine specimen. If there is discrepancy, a third specimen is recommended. When 2 out of 3 results are in the microalbuminuria range, this is evidence for incipient nephropathy and warrants increased efforts at glucose control, blood pressure control, and institution of therapy with an angiotensin-converting-enzyme (ACE) inhibitor (if the patient can tolerate it).     CBC with platelets and differential   Result Value Ref Range    WBC Count 7.0 4.0 - 11.0 10e3/uL    RBC Count 5.18 4.40 - 5.90 10e6/uL    Hemoglobin 16.2 13.3 - 17.7 g/dL    Hematocrit 48.8 40.0 - 53.0 %    MCV 94 78 - 100 fL    MCH 31.3 26.5 - 33.0 pg    MCHC 33.2 31.5 - 36.5 g/dL    RDW 12.3 10.0 - 15.0 %    Platelet Count 207 150 - 450 10e3/uL    % Neutrophils 48 %    % Lymphocytes 40 %    % Monocytes 9 %    % Eosinophils 3 %    % Basophils 1 %    % Immature Granulocytes 0 %    NRBCs per 100 WBC 0 <1 /100    Absolute Neutrophils 3.3 1.6 - 8.3 10e3/uL    Absolute Lymphocytes 2.8 0.8  - 5.3 10e3/uL    Absolute Monocytes 0.6 0.0 - 1.3 10e3/uL    Absolute Eosinophils 0.2 0.0 - 0.7 10e3/uL    Absolute Basophils 0.0 0.0 - 0.2 10e3/uL    Absolute Immature Granulocytes 0.0 <=0.4 10e3/uL    Absolute NRBCs 0.0 10e3/uL       If you have any questions or concerns, please call the clinic at the number listed above.       Sincerely,      Donya Chavarria PA-C

## 2024-06-18 NOTE — PATIENT INSTRUCTIONS
"Lab work on average will return in 1-2 hours, unless listed otherwise below (your provider will typically review & provide you with results and recommendations within 1 day):  - CBC - blood counts  - CMP/BMP - kidney and electrolyte lab. CMP adds in liver function  - A1c - 3 month average of blood sugars  - Lipid - cholesterol/triglyceride lab  - Microalbumin - checking kidney function via urine sample, in addition to above blood work  - PSA - prostate cancer screening lab    Patient Education   Preventive Care Advice   This is general advice we often give to help people stay healthy. Your care team may have specific advice just for you. Please talk to your care team about your own preventive care needs.  Lifestyle  Exercise at least 150 minutes each week (30 minutes a day, 5 days a week).  Do muscle strengthening activities 2 days a week. These help control your weight and prevent disease.  No smoking.  Wear sunscreen to prevent skin cancer.  Have your home tested for radon every 2 to 5 years. Radon is a colorless, odorless gas that can harm your lungs. To learn more, go to www.health.[facility].[place]. and search for \"Radon in Homes.\"  Keep guns unloaded and locked up in a safe place like a safe or gun vault, or, use a gun lock and hide the keys. Always lock away bullets separately. To learn more, visit [place].gov and search for \"safe gun storage.\"  Nutrition  Eat 5 or more servings of fruits and vegetables each day.  Try wheat bread, brown rice and whole grain pasta (instead of white bread, rice, and pasta).  Get enough calcium and vitamin D. Check the label on foods and aim for 100% of the RDA (recommended daily allowance).  Regular exams  Have a dental exam and cleaning every 6 months.  See your health care team every year to talk about:  Any changes in your health.  Any medicines your care team has prescribed.  Preventive care, family planning, and ways to prevent chronic diseases.  Shots (vaccines)   HPV shots (up " to age 26), if you've never had them before.  Hepatitis B shots (up to age 59), if you've never had them before.  COVID-19 shot: Get this shot when it's due.  Flu shot: Get a flu shot every year.  Tetanus shot: Get a tetanus shot every 10 years.  Pneumococcal, hepatitis A, and RSV shots: Ask your care team if you need these based on your risk.  Shingles shot (for age 50 and up).  General health tests  Diabetes screening:  Starting at age 35, Get screened for diabetes at least every 3 years.  If you are younger than age 35, ask your care team if you should be screened for diabetes.  Cholesterol test: At age 39, start having a cholesterol test every 5 years, or more often if advised.  Bone density scan (DEXA): At age 50, ask your care team if you should have this scan for osteoporosis (brittle bones).  Hepatitis C: Get tested at least once in your life.  Abdominal aortic aneurysm screening: Talk to your doctor about having this screening if you:  Have ever smoked; and  Are biologically male; and  Are between the ages of 65 and 75.  STIs (sexually transmitted infections)  Before age 24: Ask your care team if you should be screened for STIs.  After age 24: Get screened for STIs if you're at risk. You are at risk for STIs (including HIV) if:  You are sexually active with more than one person.  You don't use condoms every time.  You or a partner was diagnosed with a sexually transmitted infection.  If you are at risk for HIV, ask about PrEP medicine to prevent HIV.  Get tested for HIV at least once in your life, whether you are at risk for HIV or not.  Cancer screening tests  Cervical cancer screening: If you have a cervix, begin getting regular cervical cancer screening tests at age 21. Most people who have regular screenings with normal results can stop after age 65. Talk about this with your provider.  Breast cancer scan (mammogram): If you've ever had breasts, begin having regular mammograms starting at age 40. This  is a scan to check for breast cancer.  Colon cancer screening: It is important to start screening for colon cancer at age 45.  Have a colonoscopy test every 10 years (or more often if you're at risk) Or, ask your provider about stool tests like a FIT test every year or Cologuard test every 3 years.  To learn more about your testing options, visit: [email or web address].  For help making a decision, visit: tez/ka07524.  Prostate cancer screening test: If you have a prostate and are age 55 to 69, ask your provider if you would benefit from a yearly prostate cancer screening test.  Lung cancer screening: If you are a current or former smoker age 50 to 80, ask your care team if ongoing lung cancer screenings are right for you.  For informational purposes only. Not to replace the advice of your health care provider. Copyright   2023 [employer]. All rights reserved. Clinically reviewed by the [facility] Transitions Program. [employer] 534781 - REV 04/24.  Learning About Activities of Daily Living  What are activities of daily living?     Activities of daily living (ADLs) are the basic self-care tasks you do every day. These include eating, bathing, dressing, and moving around.  As you age, and if you have health problems, you may find that it's harder to do some of these tasks. If so, your doctor can suggest ideas that may help.  To measure what kind of help you may need, your doctor will ask how well you are able to do ADLs. Let your doctor know if there are any tasks that you are having trouble doing. This is an important first step to getting help. And when you have the help you need, you can stay as independent as possible.  How will a doctor assess your ADLs?  Asking about ADLs is part of a routine health checkup your doctor will likely do as you age. Your health check might be done in a doctor's office, in your home, or at a hospital. The goal is to find out if you are having any  problems that could make it hard to care for yourself or that make it unsafe for you to be on your own.  To measure your ADLs, your doctor will ask how hard it is for you to do routine tasks. Your doctor may also want to know if you have changed the way you do a task because of a health problem. Your doctor may watch how you:  Walk back and forth.  Keep your balance while you stand or walk.  Move from sitting to standing or from a bed to a chair.  Button or unbutton a shirt or sweater.  Remove and put on your shoes.  It's common to feel a little worried or anxious if you find you can't do all the things you used to be able to do. Talking with your doctor about ADLs is a way to make sure you're as safe as possible and able to care for yourself as well as you can. You may want to bring a caregiver, friend, or family member to your checkup. They can help you talk to your doctor.  Follow-up care is a key part of your treatment and safety. Be sure to make and go to all appointments, and call your doctor if you are having problems. It's also a good idea to know your test results and keep a list of the medicines you take.  Current as of: October 24, 2023               Content Version: 14.0    7059-3098 [employer].   Care instructions adapted under license by your healthcare professional. If you have questions about a medical condition or this instruction, always ask your healthcare professional. [employer] disclaims any warranty or liability for your use of this information.      Hearing Loss: Care Instructions  Overview     Hearing loss is a sudden or slow decrease in how well you hear. It can range from slight to profound. Permanent hearing loss can occur with aging. It also can happen when you are exposed long-term to loud noise. Examples include listening to loud music, riding motorcycles, or being around other loud machines.  Hearing loss can affect your work and home life. It can make you  feel lonely or depressed. You may feel that you have lost your independence. But hearing aids and other devices can help you hear better and feel connected to others.  Follow-up care is a key part of your treatment and safety. Be sure to make and go to all appointments, and call your doctor if you are having problems. It's also a good idea to know your test results and keep a list of the medicines you take.  How can you care for yourself at home?  Avoid loud noises whenever possible. This helps keep your hearing from getting worse.  Always wear hearing protection around loud noises.  Wear a hearing aid as directed.  A professional can help you pick a hearing aid that will work best for you.  You can also get hearing aids over the counter for mild to moderate hearing loss.  Have hearing tests as your doctor suggests. They can show whether your hearing has changed. Your hearing aid may need to be adjusted.  Use other devices as needed. These may include:  Telephone amplifiers and hearing aids that can connect to a television, stereo, radio, or microphone.  Devices that use lights or vibrations. These alert you to the doorbell, a ringing telephone, or a baby monitor.  Television closed-captioning. This shows the words at the bottom of the screen. Most new TVs can do this.  TTY (text telephone). This lets you type messages back and forth on the telephone instead of talking or listening. These devices are also called TDD. When messages are typed on the keyboard, they are sent over the phone line to a receiving TTY. The message is shown on a monitor.  Use text messaging, social media, and email if it is hard for you to communicate by telephone.  Try to learn a listening technique called speechreading. It is not lipreading. You pay attention to people's gestures, expressions, posture, and tone of voice. These clues can help you understand what a person is saying. Face the person you are talking to, and have them face you.  "Make sure the lighting is good. You need to see the other person's face clearly.  Think about counseling if you need help to adjust to your hearing loss.  When should you call for help?  Watch closely for changes in your health, and be sure to contact your doctor if:    You think your hearing is getting worse.     You have new symptoms, such as dizziness or nausea.   Where can you learn more?  Go to https://www.[employer].net/patiented  Enter R798 in the search box to learn more about \"Hearing Loss: Care Instructions.\"  Current as of: September 27, 2023               Content Version: 14.0    4665-3479 [employer].   Care instructions adapted under license by your healthcare professional. If you have questions about a medical condition or this instruction, always ask your healthcare professional. [employer] disclaims any warranty or liability for your use of this information.      Substance Use Disorder: Care Instructions  Overview     You can improve your life and health by stopping your use of alcohol or drugs. When you don't drink or use drugs, you may feel and sleep better. You may get along better with your family, friends, and coworkers. There are medicines and programs that can help with substance use disorder.  How can you care for yourself at home?  Here are some ways to help you stay sober and prevent relapse.  If you have been given medicine to help keep you sober or reduce your cravings, be sure to take it exactly as prescribed.  Talk to your doctor about programs that can help you stop using drugs or drinking alcohol.  Do not keep alcohol or drugs in your home.  Plan ahead. Think about what you'll say if other people ask you to drink or use drugs. Try not to spend time with people who drink or use drugs.  Use the time and money spent on drinking or drugs to do something that's important to you.  Preventing a relapse  Have a plan to deal with relapse. Learn to recognize changes " in your thinking that lead you to drink or use drugs. Get help before you start to drink or use drugs again.  Try to stay away from situations, friends, or places that may lead you to drink or use drugs.  If you feel the need to drink alcohol or use drugs again, seek help right away. Call a trusted friend or family member. Some people get support from organizations such as Narcotics Anonymous or [employer] or from treatment facilities.  If you relapse, get help as soon as you can. Some people make a plan with another person that outlines what they want that person to do for them if they relapse. The plan usually includes how to handle the relapse and who to notify in case of relapse.  Don't give up. Remember that a relapse doesn't mean that you have failed. Use the experience to learn the triggers that lead you to drink or use drugs. Then quit again. Recovery is a lifelong process. Many people have several relapses before they are able to quit for good.  Follow-up care is a key part of your treatment and safety. Be sure to make and go to all appointments, and call your doctor if you are having problems. It's also a good idea to know your test results and keep a list of the medicines you take.  When should you call for help?   Call 911  anytime you think you may need emergency care. For example, call if you or someone else:    Has overdosed or has withdrawal signs. Be sure to tell the emergency workers that you are or someone else is using or trying to quit using drugs. Overdose or withdrawal signs may include:  Losing consciousness.  Seizure.  Seeing or hearing things that aren't there (hallucinations).     Is thinking or talking about suicide or harming others.   Where to get help 24 hours a day, 7 days a week   If you or someone you know talks about suicide, self-harm, a mental health crisis, a substance use crisis, or any other kind of emotional distress, get help right away. You can:    Call the Suicide and  "Crisis Lifeline at 988.     Call 7-264-457-TALK (1-158.138.7923).     Text HOME to 387217 to access the Crisis Text Line.   Consider saving these numbers in your phone.  Go to [email or web address] for more information or to chat online.  Call your doctor now or seek immediate medical care if:    You are having withdrawal symptoms. These may include nausea or vomiting, sweating, shakiness, and anxiety.   Watch closely for changes in your health, and be sure to contact your doctor if:    You have a relapse.     You need more help or support to stop.   Where can you learn more?  Go to https://www.[employer].net/patiented  Enter H573 in the search box to learn more about \"Substance Use Disorder: Care Instructions.\"  Current as of: November 15, 2023               Content Version: 14.0    7278-0924 [employer].   Care instructions adapted under license by your healthcare professional. If you have questions about a medical condition or this instruction, always ask your healthcare professional. [employer] disclaims any warranty or liability for your use of this information.         "

## 2024-06-18 NOTE — PROGRESS NOTES
Preventive Care Visit  [facility]  Donya Chavarria PA-C, Family Medicine  Jun 18, 2024      Assessment & Plan       ICD-10-CM    1. Encounter for Medicare annual wellness exam  Z00.00       2. Benign prostatic hyperplasia with urinary retention  N40.1 tamsulosin (FLOMAX) 0.4 MG capsule    R33.8       3. Erectile dysfunction, unspecified erectile dysfunction type  N52.9 sildenafil (VIAGRA) 50 MG tablet      4. Bilateral impacted cerumen  H61.23       5. Colon cancer screening  Z12.11       6. Lipid screening  Z13.220 Basic Metabolic Panel     Lipid Panel      7. Mixed hyperlipidemia  E78.2       8. Screening for prostate cancer  Z12.5 PSA Screen [facility]      9. Family history of diabetes mellitus  Z83.3 Basic Metabolic Panel     Hemoglobin A1c     Albumin Random Urine Quantitative with Creat Ratio     CBC and Differential      10. Prediabetes  R73.03       11. Class 2 severe obesity due to excess calories with serious comorbidity and body mass index (BMI) of 35.0 to 35.9 in adult (H)  E66.01     Z68.35       12. Stage 3a chronic kidney disease (H)  N18.31       13. Vaccine counseling  Z71.85         Medicare annual this physical completed today, reviewed care gaps, see below.  Benign prostatic hypertrophy, well-controlled on current dose of tamsulosin 0.4 mg daily.  This was refilled.  Aware of risk, benefit potential side effects medication.  Return precautions reviewed.  Erectile dysfunction, becoming increasingly bothersome.  Discussed options for treatment.  Will start sildenafil 50 mg.  He will take 1 tablet by mouth 1 hour prior to sexual intercourse.  If erectile dysfunction is unmanaged with this dose, contemplate dose increase versus alternative agent. Discussed risks, benefits and side effects of medication at length with patient.   Bilateral cerumen impaction, mild on left and moderate on right.  Offered cerumen flush today during clinic visit, Papo currently deferred.  He would like to  trial Mauradomi at home.  Colon cancer screening, last colonoscopy 2016, due for repeat screening in 3 years due to polyps found on last scope.  I did place an order for colon cancer screening on 1/26/2024, recommend to proceed with repeat screening.  He will schedule this at his convenience.  Lipid screening, fasting labs completed today. Cholesterol 187, triglycerides 129, HDL 44, .   Mixed hyperlipidemia - HDL and triglycerides improved from last year. Cholesterol and LDL stable. ASCVD score elevated at 23.6%. Recommend statin therapy and lifestyle modifications. Result note and phone call sent to Papo to see if he is willing to start a statin/cholesterol medication.   The 10-year ASCVD risk score (Yasir CONNOR, et al., 2019) is: 23.6%    Values used to calculate the score:      Age: 74 years      Sex: Male      Is Non- : No      Diabetic: No      Tobacco smoker: No      Systolic Blood Pressure: 138 mmHg      Is BP treated: No      HDL Cholesterol: 44 mg/dL      Total Cholesterol: 187 mg/dL  Screening PSA performed today, normal 3.16 (previously 3.30 last year).   Family history of diabetes and elevated glucose on current/previous metabolic panel testing. BMP: potassium 5.0, glucose 109, creatinine 1.30, GFR 58. A1c improved to 5.7% (previously 5.9% last year). Microalbumin normal.   Borderline prediabetes with A1c of 5.7%. recommend lifestyle modifications. Recommend increased lean proteins, fruits and vegetable intake. Goal exercise 150 minutes of moderate exercise per week (walking is great exercise). Weight loss would benefit cardiopulmonary and overall health.   Chronic Kidney Disease, Stage 3a (GFR 45-59), chronic, ongoing.  Kidney function had been slowly declining.  Encourage NSAID avoidance.    Class 2 obesity, recommend lifestyle modifications as outlined in #9.   Vaccine counseling provided today in regards to Shingrix/shingles vaccine, RSV vaccine, pneumococcal and COVID  booster.  Papo is aware that if he is interested in receiving the RSV or shingles vaccine that due to Medicare regulations he would have to receive these at a local pharmacy.  If interested in COVID booster or pneumococcal vaccine, he can receive these within the clinic setting or at a local pharmacy.  He kindly defers all immunizations today.    Counseling  Appropriate preventive services were discussed with this patient, including applicable screening as appropriate for fall prevention, nutrition, physical activity, Tobacco-use cessation, weight loss and cognition.  Checklist reviewing preventive services available has been given to the patient.  Reviewed patient's diet, addressing concerns and/or questions.   The patient was instructed to see the dentist every 6 months.   He is at risk for psychosocial distress and has been provided with information to reduce risk.   Patient reported safety concerns were addressed today.The patient was provided with written information regarding signs of hearing loss.     See Patient Instructions    No follow-ups on file.    Subjective   Papo is a 74 year old, presenting for the following:  Medicare Visit (Wellness/)        6/18/2024     9:52 AM   Additional Questions   Roomed by bhargavi cao lpn     Health Care Directive  Patient has a Health Care Directive on file  Advance care planning document is on file and is current.    HPI    Papo presents to the clinic today for annual Medicare wellness examination, he states he is overall feeling in good health.  He is feeling much better than our visit in May 2024.  He has been able to take a camper/RV trip out to [place] to visit his daughter, he completed this 2-week trip in April 2024.  They will possibly be planning another trip this summer, [place] them as well.    Mental health is stable.  No concerns with anxiety or depression.    Current medications: Flomax 0.4 mg daily  for benign prostatic hypertrophy (BPH).  He states this dose is working quite well for urinary symptoms.  He does endorse that with sexual activity he does have difficulty starting and maintaining an erection from time to time.  He is wondering on next steps to help improve this. Would like PSA checked today.     Last colonoscopy in 2016, completed external facility, due to polyps was recommended of a repeat scope in 3 years.  Papo will be trying to work on scheduling this in the near future.  Declines any abdominal pain or bloating, blood or mucus in the stool.  No unintentional weight loss.    Defers immunizations today.         6/13/2024   General Health   How would you rate your overall physical health? Good   Feel stress (tense, anxious, or unable to sleep) Only a little   (!) STRESS CONCERN      6/13/2024   Nutrition   Diet: Regular (no restrictions)         6/13/2024   Exercise   Days per week of moderate/strenous exercise 6 days   Average minutes spent exercising at this level 30 min         6/13/2024   Social Factors   Frequency of gathering with friends or relatives Once a week   Worry food won't last until get money to buy more No   Food not last or not have enough money for food? No   Do you have housing?  Yes   Are you worried about losing your housing? No   Lack of transportation? No   Unable to get utilities (heat,electricity)? No         6/13/2024   Fall Risk   Fallen 2 or more times in the past year? No   Trouble with walking or balance? No          6/13/2024   Activities of Daily Living- Home Safety   Needs help with the following daily activites None of the above   Safety concerns in the home No grab bars in the bathroom         6/13/2024   Dental   Dentist two times every year? (!) NO         6/13/2024   Hearing Screening   Hearing concerns? (!) I NEED TO ASK PEOPLE TO SPEAK UP OR REPEAT THEMSELVES.    (!) IT'S HARD TO FOLLOW A CONVERSATION IN A NOISY RESTAURANT OR CROWDED ROOM.         6/13/2024    Driving Risk Screening   Patient/family members have concerns about driving No         6/13/2024   General Alertness/Fatigue Screening   Have you been more tired than usual lately? No         6/13/2024   Urinary Incontinence Screening   Bothered by leaking urine in past 6 months No         6/13/2024   TB Screening   Were you born outside of the [place]? No     Today's PHQ-2 Score:       6/17/2024     8:31 AM   PHQ-2 ( 1999 Pfizer)   Q1: Little interest or pleasure in doing things 0   Q2: Feeling down, depressed or hopeless 0   PHQ-2 Score 0   Q1: Little interest or pleasure in doing things Not at all   Q2: Feeling down, depressed or hopeless Not at all   PHQ-2 Score 0         6/13/2024   Substance Use   Alcohol more than 3/day or more than 7/wk No   Do you have a current opioid prescription? No   How severe/bad is pain from 1 to 10? 1/10   Do you use any other substances recreationally? (!) ALCOHOL     Social History     Tobacco Use    Smoking status: Never     Passive exposure: Never    Smokeless tobacco: Never   Vaping Use    Vaping status: Never Used   Substance Use Topics    Alcohol use: Yes     Comment: 1 beer per week    Drug use: Never         6/13/2024   AAA Screening   Family history of Abdominal Aortic Aneurysm (AAA)? No   ASCVD Risk   The 10-year ASCVD risk score (Yasir DK, et al., 2019) is: 23.6%    Values used to calculate the score:      Age: 74 years      Sex: Male      Is Non- : No      Diabetic: No      Tobacco smoker: No      Systolic Blood Pressure: 138 mmHg      Is BP treated: No      HDL Cholesterol: 44 mg/dL      Total Cholesterol: 187 mg/dL    Reviewed and updated as needed this visit by Provider   Tobacco  Allergies  Meds  Problems  Med Hx  Surg Hx  Fam Hx            History reviewed. No pertinent past medical history.  Past Surgical History:   Procedure Laterality Date    ARTHROPLASTY KNEE Left 3/21/2023    Procedure: ARTHROPLASTY, KNEE, TOTAL;  Surgeon:  "Caleb Garcia MD;  Location: [facility] OR    ARTHROPLASTY KNEE Right 9/5/2023    Procedure: ARTHROPLASTY, KNEE, TOTAL;  Surgeon: Caleb Garcia MD;  Location: [facility] OR    COLONOSCOPY  07/29/2016    tubular adenomas, repeat 2021     Current providers sharing in care for this patient include:  Patient Care Team:  Donya Chavarria PA-C as PCP - General (Family Medicine)  Papo Levi MD as Assigned Surgical Provider  Caleb Garcia MD as MD (Orthopaedic Surgery)  Caleb Garcia MD as Assigned Musculoskeletal Provider  Donya Chavarria PA-C as Physician Assistant (Family Medicine)  Caleb Garcia MD as MD (Orthopaedic Surgery)  Zack Roa MD as Assigned PCP    The following health maintenance items are reviewed in Epic and correct as of today:  Health Maintenance   Topic Date Due    ZOSTER IMMUNIZATION (1 of 2) Never done    RSV VACCINE (Pregnancy & 60+) (1 - 1-dose 60+ series) Never done    Pneumococcal Vaccine: 65+ Years (1 of 1 - PCV) Never done    COLORECTAL CANCER SCREENING  07/29/2019    COVID-19 Vaccine (4 - 2023-24 season) 09/01/2023    INFLUENZA VACCINE (Season Ended) 09/01/2024    MEDICARE ANNUAL WELLNESS VISIT  06/18/2025    FALL RISK ASSESSMENT  06/18/2025    GLUCOSE  06/18/2027    LIPID  06/18/2029    ADVANCE CARE PLANNING  06/18/2029    DTAP/TDAP/TD IMMUNIZATION (2 - Td or Tdap) 08/23/2032    PHQ-2 (once per calendar year)  Completed    IPV IMMUNIZATION  Aged Out    HPV IMMUNIZATION  Aged Out    MENINGITIS IMMUNIZATION  Aged Out    RSV MONOCLONAL ANTIBODY  Aged Out    HEPATITIS C SCREENING  Discontinued     Review of Systems  Constitutional, HEENT, cardiovascular, pulmonary, GI, , musculoskeletal, neuro, skin, endocrine and psych systems are negative, except as otherwise noted.       Objective    Exam  /86 (BP Location: Right arm, Patient Position: Sitting, Cuff Size: Adult Large)   Pulse 84   Temp 97.2  F (36.2  C) (Tympanic)   Resp 16   Ht 1.695 m (5' 6.75\")   Wt 103 kg (227 " "lb)   BMI 35.82 kg/m     Estimated body mass index is 35.82 kg/m  as calculated from the following:    Height as of this encounter: 1.695 m (5' 6.75\").    Weight as of this encounter: 103 kg (227 lb).    Physical Exam  GENERAL: alert and no distress  EYES: Eyes grossly normal to inspection  HENT: normal cephalic/atraumatic, both ears: Mild cerumen in left ear and moderate cerumen in right ear, nose and mouth without ulcers or lesions, oropharynx clear, and oral mucous membranes moist  NECK: no adenopathy, no asymmetry, masses, or scars  RESP: lungs clear to auscultation - no rales, rhonchi or wheezes  CV: regular rate and rhythm, normal S1 S2, no S3 or S4, no murmur, click or rub, no peripheral edema  ABDOMEN: soft, nontender, no hepatosplenomegaly, no masses and bowel sounds normal  MS: no gross musculoskeletal defects noted, no edema  SKIN: no suspicious lesions or rashes  NEURO: Normal strength and tone, mentation intact and speech normal  PSYCH: mentation appears normal, affect normal/bright  LYMPH: normal ant/post cervical, supraclavicular nodes        6/18/2024   Mini Cog   Clock Draw Score 2 Normal   3 Item Recall 3 objects recalled   Mini Cog Total Score 5     Results for orders placed or performed in visit on 06/18/24   Basic Metabolic Panel     Status: Abnormal   Result Value Ref Range    Sodium 141 135 - 145 mmol/L    Potassium 5.0 3.4 - 5.3 mmol/L    Chloride 105 98 - 107 mmol/L    Carbon Dioxide (CO2) 27 22 - 29 mmol/L    Anion Gap 9 7 - 15 mmol/L    Urea Nitrogen 15.2 8.0 - 23.0 mg/dL    Creatinine 1.30 (H) 0.67 - 1.17 mg/dL    GFR Estimate 58 (L) >60 mL/min/1.73m2    Calcium 9.5 8.8 - 10.2 mg/dL    Glucose 109 (H) 70 - 99 mg/dL    Patient Fasting > 8hrs? Yes    Lipid Panel     Status: Abnormal   Result Value Ref Range    Cholesterol 187 <200 mg/dL    Triglycerides 129 <150 mg/dL    Direct Measure HDL 44 >=40 mg/dL    LDL Cholesterol Calculated 117 (H) <=100 mg/dL    Non HDL Cholesterol 143 (H) <130 " mg/dL    Patient Fasting > 8hrs? Yes     Narrative    Cholesterol  Desirable:  <200 mg/dL    Triglycerides  Normal:  Less than 150 mg/dL  Borderline High:  150-199 mg/dL  High:  200-499 mg/dL  Very High:  Greater than or equal to 500 mg/dL    Direct Measure HDL  Female:  Greater than or equal to 50 mg/dL   Male:  Greater than or equal to 40 mg/dL    LDL Cholesterol  Desirable:  <100mg/dL  Above Desirable:  100-129 mg/dL   Borderline High:  130-159 mg/dL   High:  160-189 mg/dL   Very High:  >= 190 mg/dL    Non HDL Cholesterol  Desirable:  130 mg/dL  Above Desirable:  130-159 mg/dL  Borderline High:  160-189 mg/dL  High:  190-219 mg/dL  Very High:  Greater than or equal to 220 mg/dL   PSA Screen [facility]     Status: Normal   Result Value Ref Range    Prostate Specific Antigen Screen 3.16 0.00 - 6.50 ng/mL    Narrative    This result is obtained using the Roche Elecsys total PSA method on the maria ines e601 immunoassay analyzer. Results obtained with different assay methods or kits cannot be used interchangeably.   Hemoglobin A1c     Status: Normal   Result Value Ref Range    Hemoglobin A1C 5.7 4.0 - 6.2 %   Albumin Random Urine Quantitative with Creat Ratio     Status: None   Result Value Ref Range    Creatinine Urine mg/dL 170.9 mg/dL    Albumin Urine mg/L <12.0 mg/L    Albumin Urine mg/g Cr     CBC with platelets and differential     Status: None   Result Value Ref Range    WBC Count 7.0 4.0 - 11.0 10e3/uL    RBC Count 5.18 4.40 - 5.90 10e6/uL    Hemoglobin 16.2 13.3 - 17.7 g/dL    Hematocrit 48.8 40.0 - 53.0 %    MCV 94 78 - 100 fL    MCH 31.3 26.5 - 33.0 pg    MCHC 33.2 31.5 - 36.5 g/dL    RDW 12.3 10.0 - 15.0 %    Platelet Count 207 150 - 450 10e3/uL    % Neutrophils 48 %    % Lymphocytes 40 %    % Monocytes 9 %    % Eosinophils 3 %    % Basophils 1 %    % Immature Granulocytes 0 %    NRBCs per 100 WBC 0 <1 /100    Absolute Neutrophils 3.3 1.6 - 8.3 10e3/uL    Absolute Lymphocytes 2.8 0.8 - 5.3 10e3/uL    Absolute  Monocytes 0.6 0.0 - 1.3 10e3/uL    Absolute Eosinophils 0.2 0.0 - 0.7 10e3/uL    Absolute Basophils 0.0 0.0 - 0.2 10e3/uL    Absolute Immature Granulocytes 0.0 <=0.4 10e3/uL    Absolute NRBCs 0.0 10e3/uL   CBC and Differential     Status: None    Narrative    The following orders were created for panel order CBC and Differential.  Procedure                               Abnormality         Status                     ---------                               -----------         ------                     CBC with platelets and d...[[id number]]                      Final result                 Please view results for these tests on the individual orders.     Signed Electronically by: Donya Chavarria PA-C    Answers submitted by the patient for this visit:  ALE-7 (Submitted on 6/18/2024)  ALE 7 TOTAL SCORE: 0

## 2025-01-27 ENCOUNTER — PATIENT OUTREACH (OUTPATIENT)
Dept: GASTROENTEROLOGY | Facility: CLINIC | Age: 75
End: 2025-01-27
Payer: MEDICARE

## 2025-01-27 DIAGNOSIS — Z12.11 SPECIAL SCREENING FOR MALIGNANT NEOPLASMS, COLON: Primary | ICD-10-CM

## 2025-01-27 NOTE — PROGRESS NOTES
"Patients last colonoscopy on file was on 2016 and was recommended to repeat in 3 years. Order placed by [facility] last year now , will update [facility] enrollment status and place new order.  [facility] Screening Colonoscopy Referral Review    Patient meets the inclusion criteria for screening colonoscopy standing order.    Ordering/Referring Provider:  Donya Brady      BMI: Estimated body mass index is 35.82 kg/m  as calculated from the following:    Height as of 24: 1.695 m (5' 6.75\").    Weight as of 24: 103 kg (227 lb).     Sedation:  Does patient have any of the following conditions affecting sedation?  No medical conditions affecting sedation.    Previous Scopes:  Any previous recommendations or follow up needs based on previous scope?  na / No recommendations.    Medical Concerns to Postpone Order:  Does patient have any of the following medical concerns that should postpone/delay colonoscopy referral?  No medical conditions affecting colonoscopy referral.    Final Referral Details:  Based on patient's medical history patient is appropriate for referral order with moderate sedation. If patient's BMI > 50 do not schedule in [facility].  "

## 2025-03-25 PROBLEM — Z86.0101 H/O ADENOMATOUS POLYP OF COLON: Status: ACTIVE | Noted: 2022-12-14

## 2025-04-07 ENCOUNTER — ANESTHESIA EVENT (OUTPATIENT)
Dept: SURGERY | Facility: OTHER | Age: 75
End: 2025-04-07
Payer: MEDICARE

## 2025-04-07 ENCOUNTER — ANESTHESIA (OUTPATIENT)
Dept: SURGERY | Facility: OTHER | Age: 75
End: 2025-04-07
Payer: MEDICARE

## 2025-04-07 ENCOUNTER — HOSPITAL ENCOUNTER (OUTPATIENT)
Facility: OTHER | Age: 75
Discharge: HOME | End: 2025-04-07
Attending: SURGERY | Admitting: SURGERY
Payer: MEDICARE

## 2025-04-07 VITALS
HEART RATE: 67 BPM | BODY MASS INDEX: 35.63 KG/M2 | SYSTOLIC BLOOD PRESSURE: 128 MMHG | TEMPERATURE: 96.3 F | OXYGEN SATURATION: 94 % | DIASTOLIC BLOOD PRESSURE: 77 MMHG | HEIGHT: 67 IN | WEIGHT: 227 LBS | RESPIRATION RATE: 12 BRPM

## 2025-04-07 PROBLEM — K63.5 COLON POLYPS: Status: ACTIVE | Noted: 2025-04-07

## 2025-04-07 PROCEDURE — 45385 COLONOSCOPY W/LESION REMOVAL: CPT | Performed by: NURSE ANESTHETIST, CERTIFIED REGISTERED

## 2025-04-07 PROCEDURE — 250N000009 HC RX 250: Performed by: NURSE ANESTHETIST, CERTIFIED REGISTERED

## 2025-04-07 PROCEDURE — 45380 COLONOSCOPY AND BIOPSY: CPT | Mod: PT,XU

## 2025-04-07 PROCEDURE — 88305 TISSUE EXAM BY PATHOLOGIST: CPT

## 2025-04-07 PROCEDURE — 258N000003 HC RX IP 258 OP 636: Performed by: NURSE ANESTHETIST, CERTIFIED REGISTERED

## 2025-04-07 PROCEDURE — 99100 ANES PT EXTEME AGE<1 YR&>70: CPT | Performed by: NURSE ANESTHETIST, CERTIFIED REGISTERED

## 2025-04-07 PROCEDURE — 45385 COLONOSCOPY W/LESION REMOVAL: CPT | Mod: PT | Performed by: SURGERY

## 2025-04-07 PROCEDURE — 999N000010 HC STATISTIC ANES STAT CODE-CRNA PER MINUTE: Performed by: SURGERY

## 2025-04-07 PROCEDURE — 45378 DIAGNOSTIC COLONOSCOPY: CPT | Performed by: SURGERY

## 2025-04-07 PROCEDURE — 250N000011 HC RX IP 250 OP 636: Performed by: NURSE ANESTHETIST, CERTIFIED REGISTERED

## 2025-04-07 PROCEDURE — 258N000003 HC RX IP 258 OP 636: Performed by: SURGERY

## 2025-04-07 PROCEDURE — 45380 COLONOSCOPY AND BIOPSY: CPT | Mod: PT | Performed by: SURGERY

## 2025-04-07 RX ORDER — LIDOCAINE HYDROCHLORIDE 20 MG/ML
INJECTION, SOLUTION INFILTRATION; PERINEURAL PRN
Status: DISCONTINUED | OUTPATIENT
Start: 2025-04-07 | End: 2025-04-07

## 2025-04-07 RX ORDER — NALOXONE HYDROCHLORIDE 0.4 MG/ML
0.2 INJECTION, SOLUTION INTRAMUSCULAR; INTRAVENOUS; SUBCUTANEOUS
Status: DISCONTINUED | OUTPATIENT
Start: 2025-04-07 | End: 2025-04-07 | Stop reason: HOSPADM

## 2025-04-07 RX ORDER — FLUMAZENIL 0.1 MG/ML
0.2 INJECTION, SOLUTION INTRAVENOUS
Status: DISCONTINUED | OUTPATIENT
Start: 2025-04-07 | End: 2025-04-07 | Stop reason: HOSPADM

## 2025-04-07 RX ORDER — NALOXONE HYDROCHLORIDE 0.4 MG/ML
0.4 INJECTION, SOLUTION INTRAMUSCULAR; INTRAVENOUS; SUBCUTANEOUS
Status: DISCONTINUED | OUTPATIENT
Start: 2025-04-07 | End: 2025-04-07 | Stop reason: HOSPADM

## 2025-04-07 RX ORDER — LIDOCAINE 40 MG/G
CREAM TOPICAL
Status: DISCONTINUED | OUTPATIENT
Start: 2025-04-07 | End: 2025-04-07 | Stop reason: HOSPADM

## 2025-04-07 RX ORDER — SODIUM CHLORIDE, SODIUM LACTATE, POTASSIUM CHLORIDE, CALCIUM CHLORIDE 600; 310; 30; 20 MG/100ML; MG/100ML; MG/100ML; MG/100ML
INJECTION, SOLUTION INTRAVENOUS CONTINUOUS
Status: DISCONTINUED | OUTPATIENT
Start: 2025-04-07 | End: 2025-04-07 | Stop reason: HOSPADM

## 2025-04-07 RX ORDER — PROPOFOL 10 MG/ML
INJECTION, EMULSION INTRAVENOUS CONTINUOUS PRN
Status: DISCONTINUED | OUTPATIENT
Start: 2025-04-07 | End: 2025-04-07

## 2025-04-07 RX ORDER — PROPOFOL 10 MG/ML
INJECTION, EMULSION INTRAVENOUS PRN
Status: DISCONTINUED | OUTPATIENT
Start: 2025-04-07 | End: 2025-04-07

## 2025-04-07 RX ORDER — ONDANSETRON 2 MG/ML
4 INJECTION INTRAMUSCULAR; INTRAVENOUS
Status: DISCONTINUED | OUTPATIENT
Start: 2025-04-07 | End: 2025-04-07 | Stop reason: HOSPADM

## 2025-04-07 RX ADMIN — PROPOFOL 30 MG: 10 INJECTION, EMULSION INTRAVENOUS at 10:35

## 2025-04-07 RX ADMIN — PROPOFOL 30 MG: 10 INJECTION, EMULSION INTRAVENOUS at 10:38

## 2025-04-07 RX ADMIN — PROPOFOL 50 MG: 10 INJECTION, EMULSION INTRAVENOUS at 10:36

## 2025-04-07 RX ADMIN — PROPOFOL 140 MCG/KG/MIN: 10 INJECTION, EMULSION INTRAVENOUS at 10:36

## 2025-04-07 RX ADMIN — LIDOCAINE HYDROCHLORIDE 40 MG: 20 INJECTION, SOLUTION INFILTRATION; PERINEURAL at 10:36

## 2025-04-07 RX ADMIN — PHENYLEPHRINE HYDROCHLORIDE 100 MCG: 10 INJECTION INTRAVENOUS at 10:50

## 2025-04-07 RX ADMIN — PHENYLEPHRINE HYDROCHLORIDE 100 MCG: 10 INJECTION INTRAVENOUS at 10:59

## 2025-04-07 RX ADMIN — SODIUM CHLORIDE, SODIUM LACTATE, POTASSIUM CHLORIDE, AND CALCIUM CHLORIDE 100 ML/HR: .6; .31; .03; .02 INJECTION, SOLUTION INTRAVENOUS at 10:15

## 2025-04-07 ASSESSMENT — ACTIVITIES OF DAILY LIVING (ADL)
ADLS_ACUITY_SCORE: 57
ADLS_ACUITY_SCORE: 57

## 2025-04-07 NOTE — DISCHARGE INSTRUCTIONS
[facility]  Adult Discharge Orders & Instructions      For 24 hours after surgery:  Get plenty of rest.  A responsible adult must stay with you for at least 24 hours after you leave the hospital.   You may feel lightheaded.  IF so, sit for a few minutes before standing.  Have someone help you get up.   You may have a slight fever. Call the doctor if your fever is over 101 F (38.3 C) (taken under the tongue) or lasts longer than 24 hours.  You may have a dry mouth, a sore throat, muscle aches or trouble sleeping.  These should go away after 24 hours.  Do not make important or legal decisions.  6.   Do not drive or use heavy equipment.  If you have weakness or tingling, don't drive or use heavy equipment until this feeling goes away.                                                                                                                                                                         To contact a doctor, call    [phone]______________

## 2025-04-07 NOTE — ANESTHESIA PREPROCEDURE EVALUATION
Anesthesia Pre-Procedure Evaluation    Patient: Papo Gonzalez   MRN: [id number] : 1950        Procedure : Procedure(s):  Colonoscopy          History reviewed. No pertinent past medical history.   Past Surgical History:   Procedure Laterality Date    ARTHROPLASTY KNEE Left 3/21/2023    Procedure: ARTHROPLASTY, KNEE, TOTAL;  Surgeon: Caleb Garcia MD;  Location: [facility] OR    ARTHROPLASTY KNEE Right 2023    Procedure: ARTHROPLASTY, KNEE, TOTAL;  Surgeon: Caleb Garcia MD;  Location: [facility] OR    COLONOSCOPY  2016    tubular adenomas, repeat       No Known Allergies   Social History     Tobacco Use    Smoking status: Never     Passive exposure: Never    Smokeless tobacco: Never   Substance Use Topics    Alcohol use: Yes     Comment: 1 beer per week      Wt Readings from Last 1 Encounters:   25 103 kg (227 lb)        Anesthesia Evaluation   Pt has had prior anesthetic. Type: General and MAC.    No history of anesthetic complications       ROS/MED HX  ENT/Pulmonary:     (+)     LAURA risk factors, snores loudly,  obese,                                Neurologic:  - neg neurologic ROS     Cardiovascular:     (+)  - -   -  - -                                 Previous cardiac testing   Echo: Date: Results:    Stress Test:  Date: Results:    ECG Reviewed:  Date: 23 Results:  80 SR  Cath:  Date: Results:      METS/Exercise Tolerance: >4 METS    Hematologic:  - neg hematologic  ROS     Musculoskeletal:   (+)  arthritis,             GI/Hepatic:     (+)        bowel prep,            Renal/Genitourinary: Comment: Creatinine 1.30    (+) renal disease,             Endo: Comment: Prediabetes    (+)               Obesity,       Psychiatric/Substance Use:  - neg psychiatric ROS     Infectious Disease:  - neg infectious disease ROS     Malignancy:  - neg malignancy ROS     Other:  - neg other ROS          Physical Exam    Airway      Comment: Rodriguez    Mallampati: II   TM distance: > 3 FB   Neck ROM: full  "  Mouth opening: > 3 cm    Respiratory Devices and Support         Dental       (+) Minor Abnormalities - some fillings, tiny chips      Cardiovascular   cardiovascular exam normal       Rhythm and rate: regular and normal     Pulmonary   pulmonary exam normal        breath sounds clear to auscultation           OUTSIDE LABS:  CBC:   Lab Results   Component Value Date    WBC 7.0 06/18/2024    WBC 7.4 08/22/2023    HGB 16.2 06/18/2024    HGB 13.4 09/06/2023    HCT 48.8 06/18/2024    HCT 45.2 08/22/2023     06/18/2024     08/22/2023     BMP:   Lab Results   Component Value Date     06/18/2024     08/22/2023    POTASSIUM 5.0 06/18/2024    POTASSIUM 4.1 08/22/2023    CHLORIDE 105 06/18/2024    CHLORIDE 106 08/22/2023    CO2 27 06/18/2024    CO2 25 08/22/2023    BUN 15.2 06/18/2024    BUN 13.8 08/22/2023    CR 1.30 (H) 06/18/2024    CR 1.10 08/22/2023     (H) 06/18/2024     (H) 09/06/2023     COAGS: No results found for: \"PTT\", \"INR\", \"FIBR\"  POC: No results found for: \"BGM\", \"HCG\", \"HCGS\"  HEPATIC:   Lab Results   Component Value Date    ALBUMIN 4.4 12/06/2022    PROTTOTAL 7.7 12/06/2022    ALT 36 12/06/2022    AST 26 12/06/2022    ALKPHOS 74 12/06/2022    BILITOTAL 0.5 12/06/2022     OTHER:   Lab Results   Component Value Date    LACT 1.3 03/21/2023    A1C 5.7 06/18/2024    LUIGI 9.5 06/18/2024       Anesthesia Plan    ASA Status:  3    NPO Status:  NPO Appropriate    Anesthesia Type: MAC.     - Reason for MAC: straight local not clinically adequate   Induction: Intravenous, Propofol.   Maintenance: N/A.        Consents    Anesthesia Plan(s) and associated risks, benefits, and realistic alternatives discussed. Questions answered and patient/representative(s) expressed understanding.     - Discussed: Risks, Benefits and Alternatives for BOTH SEDATION and the PROCEDURE were discussed     - Discussed with:  Patient      - Specific Concerns: Aspiration, stroke, and heart attack.     - " "Extended Intubation/Ventilatory Support Discussed: No.      - Patient is DNR/DNI Status: No     Use of blood products discussed: No .     Postoperative Care            Comments:               LUIZ Seay CRNA    Clinically Significant Risk Factors Present on Admission                             # Obesity: Estimated body mass index is 35.82 kg/m  as calculated from the following:    Height as of this encounter: 1.695 m (5' 6.75\").    Weight as of this encounter: 103 kg (227 lb).                "

## 2025-04-07 NOTE — ANESTHESIA CARE TRANSFER NOTE
Patient: Papo Gonzalez    Procedure: Procedure(s):  Colonoscopy WITH POLYPECTOMY       Diagnosis: Screen for colon cancer [Z12.11]  Diagnosis Additional Information: No value filed.    Anesthesia Type:   MAC     Note:    Oropharynx: spontaneously breathing  Level of Consciousness: awake and drowsy  Oxygen Supplementation: room air    Independent Airway: airway patency satisfactory and stable    Vital Signs Stable: post-procedure vital signs reviewed and stable  Report to RN Given: handoff report given  Patient transferred to: Phase II    Handoff Report: Identifed the Patient, Identified the Reponsible Provider, Reviewed the pertinent medical history, Discussed the surgical course, Reviewed Intra-OP anesthesia mangement and issues during anesthesia, Set expectations for post-procedure period and Allowed opportunity for questions and acknowledgement of understanding      Vitals:  Vitals Value Taken Time   BP     Temp     Pulse     Resp     SpO2 92 % 04/07/25 1107   Vitals shown include unfiled device data.    Electronically Signed By: LUIZ TAY CRNA  April 7, 2025  11:08 AM

## 2025-04-07 NOTE — H&P
"History and Physical    CHIEF COMPLAINT / REASON FOR PROCEDURE:  h/o polyps    PERTINENT HISTORY   Patient is a 74 year old male who presents today for screening colonoscopy for h/o polyps.   Last colonoscopy 2016.   Patient has no complaints.    History reviewed. No pertinent past medical history.  Past Surgical History:   Procedure Laterality Date    ARTHROPLASTY KNEE Left 3/21/2023    Procedure: ARTHROPLASTY, KNEE, TOTAL;  Surgeon: Caleb Garcia MD;  Location: [facility] OR    ARTHROPLASTY KNEE Right 9/5/2023    Procedure: ARTHROPLASTY, KNEE, TOTAL;  Surgeon: Caleb Garcia MD;  Location: [facility] OR    COLONOSCOPY  07/29/2016    tubular adenomas, repeat 2021       Bleeding tendencies:  No    ALLERGIES/SENSITIVITIES: No Known Allergies     CURRENT MEDICATIONS:    Prior to Admission medications    Medication Sig Start Date End Date Taking? Authorizing Provider   sildenafil (VIAGRA) 50 MG tablet Take 1 tablet (50 mg) by mouth one hour prior to sexual intercourse 6/18/24  Yes Donya Brady PA-C   tamsulosin (FLOMAX) 0.4 MG capsule Take 1 capsule (0.4 mg) by mouth daily 6/18/24  Yes Donya Brady PA-C       Physical Exam:  Temp 97.3  F (36.3  C) (Tympanic)   Ht 1.695 m (5' 6.75\")   Wt 103 kg (227 lb)   SpO2 97%   BMI 35.82 kg/m    EXAM:  Chest/Respiratory Exam: Normal - Clear to auscultation without rales, rhonchi, or wheezing.  Cardiovascular Exam: regular rate and rhythm        PLAN: COLONOSCOPY .  Patient understands risks of bleeding, perforation, potential inability to reach cecum, aspiration and wishes to proceed. MAC needed for age and ASA III.       "

## 2025-04-07 NOTE — OR NURSING
Patient has been discharged to home at 1152 via w/c accompanied by GLEN welch    Written discharge instructions were provided to patient.  Prescriptions were none.  Patient states their pain is none, and denies any nausea or dizziness upon discharge.    Patient and adult caring for them verbalize understanding of discharge instructions including no driving until tomorrow and no longer taking narcotic pain medications - no operating mechanical equipment and no making any important decisions.They understand reason for discharge, and necessary follow-up appointments.

## 2025-04-07 NOTE — ANESTHESIA POSTPROCEDURE EVALUATION
Patient: Papo Gonzalez    Procedure: Procedure(s):  Colonoscopy WITH POLYPECTOMY       Anesthesia Type:  MAC    Note:  Disposition: Outpatient   Postop Pain Control: Uneventful            Sign Out: Well controlled pain   PONV: No   Neuro/Psych: Uneventful            Sign Out: Acceptable/Baseline neuro status   Airway/Respiratory: Uneventful            Sign Out: Acceptable/Baseline resp. status   CV/Hemodynamics: Uneventful            Sign Out: Acceptable CV status; No obvious hypovolemia; No obvious fluid overload   Other NRE: NONE   DID A NON-ROUTINE EVENT OCCUR?            Last vitals:  Vitals Value Taken Time   /77 04/07/25 1130   Temp 96.3  F (35.7  C) 04/07/25 1108   Pulse 67 04/07/25 1130   Resp 12 04/07/25 1108   SpO2 94 % 04/07/25 1136   Vitals shown include unfiled device data.    Electronically Signed By: LUIZ TAY CRNA  April 7, 2025  11:37 AM

## 2025-04-07 NOTE — OP NOTE
PROCEDURE NOTE    DATE OF SERVICE: 4/7/2025    SURGEON: Papo Levi MD    PRE-OP DIAGNOSIS:    Screening  History of Polyps      POST-OP DIAGNOSIS:  Same  Polyps at cecum and HF    PROCEDURE:   Colonoscopy with snare polypectomies and cold biopsies      ANESTHESIA:  AMADO Hein CRNA    INDICATION FOR THE PROCEDURE: The patient is a 74 year old male with h/o polyps . The patient has no other complaints  . After explaining the risks to include bleeding, perforation, potential inability toreach the cecum, the patient wished to proceed.    PROCEDURE:After adequate sedation, the patient was in the left lateral decubitus position.  Rectal exam was performed.  There was normal tone and no palpable masses .  The colonoscope was introduced into the rectum and advanced to the cecum with Mild difficulty.  The patient's prep was good.  The terminal cecum was reached.  The cecum, ascending, transverse, descending and sigmoid colon was with two small 0.3 cm polyps at base of cecum removed with Jumbo forceps. On fold across ICV a raised 0.7 cm polyp and at HF a 0.5 cm raised polyp were completely removed by snare .  The scope was retroflexed in the rectum.  The rectum was unremarkable  .  The scope was straightened and removed.  The patient tolerated the procedure well.     ESTIMATED BLOOD LOSS: none    COMPLICATIONS:  None    TISSUE REMOVED:  Yes    RECOMMEND:      Follow-up pending pathology    Papo Levi MD FACS

## 2025-04-09 PROBLEM — K63.5 COLON POLYPS: Status: RESOLVED | Noted: 2025-04-07 | Resolved: 2025-04-09

## 2025-04-09 LAB
PATH REPORT.COMMENTS IMP SPEC: NORMAL
PATH REPORT.FINAL DX SPEC: NORMAL
PATH REPORT.RELEVANT HX SPEC: NORMAL
PHOTO IMAGE: NORMAL

## 2025-07-20 ENCOUNTER — HEALTH MAINTENANCE LETTER (OUTPATIENT)
Age: 75
End: 2025-07-20

## 2025-08-19 SDOH — HEALTH STABILITY: PHYSICAL HEALTH: ON AVERAGE, HOW MANY DAYS PER WEEK DO YOU ENGAGE IN MODERATE TO STRENUOUS EXERCISE (LIKE A BRISK WALK)?: 5 DAYS

## 2025-08-19 SDOH — HEALTH STABILITY: PHYSICAL HEALTH: ON AVERAGE, HOW MANY MINUTES DO YOU ENGAGE IN EXERCISE AT THIS LEVEL?: 30 MIN

## 2025-08-19 ASSESSMENT — SOCIAL DETERMINANTS OF HEALTH (SDOH): HOW OFTEN DO YOU GET TOGETHER WITH FRIENDS OR RELATIVES?: TWICE A WEEK

## 2025-08-20 ENCOUNTER — RESULTS FOLLOW-UP (OUTPATIENT)
Dept: FAMILY MEDICINE | Facility: OTHER | Age: 75
End: 2025-08-20

## 2025-08-20 ENCOUNTER — OFFICE VISIT (OUTPATIENT)
Dept: FAMILY MEDICINE | Facility: OTHER | Age: 75
End: 2025-08-20
Attending: PHYSICIAN ASSISTANT
Payer: MEDICARE

## 2025-08-20 VITALS
HEART RATE: 84 BPM | WEIGHT: 231 LBS | TEMPERATURE: 97 F | SYSTOLIC BLOOD PRESSURE: 126 MMHG | RESPIRATION RATE: 20 BRPM | DIASTOLIC BLOOD PRESSURE: 88 MMHG | HEIGHT: 66 IN | BODY MASS INDEX: 37.12 KG/M2

## 2025-08-20 DIAGNOSIS — R33.8 BENIGN PROSTATIC HYPERPLASIA WITH URINARY RETENTION: ICD-10-CM

## 2025-08-20 DIAGNOSIS — H61.23 BILATERAL IMPACTED CERUMEN: ICD-10-CM

## 2025-08-20 DIAGNOSIS — N18.31 STAGE 3A CHRONIC KIDNEY DISEASE (H): ICD-10-CM

## 2025-08-20 DIAGNOSIS — R03.0 ELEVATED BLOOD PRESSURE READING WITHOUT DIAGNOSIS OF HYPERTENSION: ICD-10-CM

## 2025-08-20 DIAGNOSIS — Z12.5 SCREENING FOR PROSTATE CANCER: ICD-10-CM

## 2025-08-20 DIAGNOSIS — Z13.220 SCREENING FOR CHOLESTEROL LEVEL: ICD-10-CM

## 2025-08-20 DIAGNOSIS — E66.01 CLASS 2 SEVERE OBESITY DUE TO EXCESS CALORIES WITH SERIOUS COMORBIDITY AND BODY MASS INDEX (BMI) OF 35.0 TO 35.9 IN ADULT (H): ICD-10-CM

## 2025-08-20 DIAGNOSIS — R73.03 PREDIABETES: ICD-10-CM

## 2025-08-20 DIAGNOSIS — Z00.00 ENCOUNTER FOR MEDICARE ANNUAL WELLNESS EXAM: Primary | ICD-10-CM

## 2025-08-20 DIAGNOSIS — N52.9 ERECTILE DYSFUNCTION, UNSPECIFIED ERECTILE DYSFUNCTION TYPE: ICD-10-CM

## 2025-08-20 DIAGNOSIS — N40.1 BENIGN PROSTATIC HYPERPLASIA WITH URINARY RETENTION: ICD-10-CM

## 2025-08-20 DIAGNOSIS — E66.812 CLASS 2 SEVERE OBESITY DUE TO EXCESS CALORIES WITH SERIOUS COMORBIDITY AND BODY MASS INDEX (BMI) OF 35.0 TO 35.9 IN ADULT (H): ICD-10-CM

## 2025-08-20 LAB
ALBUMIN SERPL BCG-MCNC: 4.2 G/DL (ref 3.5–5.2)
ALP SERPL-CCNC: 73 U/L (ref 40–150)
ALT SERPL W P-5'-P-CCNC: 28 U/L (ref 0–70)
ANION GAP SERPL CALCULATED.3IONS-SCNC: 11 MMOL/L (ref 7–15)
AST SERPL W P-5'-P-CCNC: 28 U/L (ref 0–45)
BILIRUB SERPL-MCNC: 0.7 MG/DL
BUN SERPL-MCNC: 12.7 MG/DL (ref 8–23)
CALCIUM SERPL-MCNC: 8.8 MG/DL (ref 8.8–10.4)
CHLORIDE SERPL-SCNC: 105 MMOL/L (ref 98–107)
CHOLEST SERPL-MCNC: 172 MG/DL
CREAT SERPL-MCNC: 1.15 MG/DL (ref 0.67–1.17)
EGFRCR SERPLBLD CKD-EPI 2021: 67 ML/MIN/1.73M2
ERYTHROCYTE [DISTWIDTH] IN BLOOD BY AUTOMATED COUNT: 12.9 % (ref 10–15)
EST. AVERAGE GLUCOSE BLD GHB EST-MCNC: 120 MG/DL
FASTING STATUS PATIENT QL REPORTED: ABNORMAL
FASTING STATUS PATIENT QL REPORTED: ABNORMAL
GLUCOSE SERPL-MCNC: 114 MG/DL (ref 70–99)
HBA1C MFR BLD: 5.8 %
HCO3 SERPL-SCNC: 23 MMOL/L (ref 22–29)
HCT VFR BLD AUTO: 47.1 % (ref 40–53)
HDLC SERPL-MCNC: 44 MG/DL
HGB BLD-MCNC: 16 G/DL (ref 13.3–17.7)
LDLC SERPL CALC-MCNC: 103 MG/DL
MCH RBC QN AUTO: 31.9 PG (ref 26.5–33)
MCHC RBC AUTO-ENTMCNC: 34 G/DL (ref 31.5–36.5)
MCV RBC AUTO: 94 FL (ref 78–100)
NONHDLC SERPL-MCNC: 128 MG/DL
PLATELET # BLD AUTO: 208 10E3/UL (ref 150–450)
POTASSIUM SERPL-SCNC: 4.3 MMOL/L (ref 3.4–5.3)
PROT SERPL-MCNC: 7.2 G/DL (ref 6.4–8.3)
PSA SERPL DL<=0.01 NG/ML-MCNC: 4.94 NG/ML (ref 0–6.5)
RBC # BLD AUTO: 5.01 10E6/UL (ref 4.4–5.9)
SODIUM SERPL-SCNC: 139 MMOL/L (ref 135–145)
TRIGL SERPL-MCNC: 123 MG/DL
WBC # BLD AUTO: 6.29 10E3/UL (ref 4–11)

## 2025-08-20 PROCEDURE — G0463 HOSPITAL OUTPT CLINIC VISIT: HCPCS

## 2025-08-20 PROCEDURE — 36415 COLL VENOUS BLD VENIPUNCTURE: CPT | Mod: ZL | Performed by: PHYSICIAN ASSISTANT

## 2025-08-20 PROCEDURE — 85014 HEMATOCRIT: CPT | Mod: ZL | Performed by: PHYSICIAN ASSISTANT

## 2025-08-20 PROCEDURE — 69209 REMOVE IMPACTED EAR WAX UNI: CPT | Performed by: PHYSICIAN ASSISTANT

## 2025-08-20 PROCEDURE — G0103 PSA SCREENING: HCPCS | Mod: ZL | Performed by: PHYSICIAN ASSISTANT

## 2025-08-20 PROCEDURE — 80061 LIPID PANEL: CPT | Mod: ZL | Performed by: PHYSICIAN ASSISTANT

## 2025-08-20 PROCEDURE — 83036 HEMOGLOBIN GLYCOSYLATED A1C: CPT | Mod: ZL | Performed by: PHYSICIAN ASSISTANT

## 2025-08-20 PROCEDURE — 84295 ASSAY OF SERUM SODIUM: CPT | Mod: ZL | Performed by: PHYSICIAN ASSISTANT

## 2025-08-20 RX ORDER — SILDENAFIL 50 MG/1
TABLET, FILM COATED ORAL
Qty: 12 TABLET | Refills: 3 | Status: SHIPPED | OUTPATIENT
Start: 2025-08-20

## 2025-08-20 RX ORDER — TAMSULOSIN HYDROCHLORIDE 0.4 MG/1
0.4 CAPSULE ORAL DAILY
Qty: 90 CAPSULE | Refills: 4 | Status: SHIPPED | OUTPATIENT
Start: 2025-08-20

## 2025-08-20 ASSESSMENT — PAIN SCALES - GENERAL: PAINLEVEL_OUTOF10: NO PAIN (0)

## (undated) DEVICE — HOOD T4 PROTECTIVE STERI FACE SHIELD 400-800

## (undated) DEVICE — STAPLER SKIN 35 WIDE PMW35 6/BX

## (undated) DEVICE — DRSG ABDOMINAL PAD UNSTERILE 5X9" 9190

## (undated) DEVICE — PREP CHLORAPREP 26ML TINTED ORANGE  260815

## (undated) DEVICE — Device

## (undated) DEVICE — PEN MARKING SKIN W/LABELS 31145918

## (undated) DEVICE — BLADE SAW OSCIL/SAG STRK 25X90X1.27MM 4125-127-090

## (undated) DEVICE — DRAPE STERI U 1015

## (undated) DEVICE — PENCIL MEGADYNE TELESCOPING SMOKE EVACUATION 10 FT 251010J

## (undated) DEVICE — GLOVE PROTEXIS PI ORTHO PF 7.5 2D73HT75

## (undated) DEVICE — PACK MAJOR ORTHO SOP15MOFCA

## (undated) DEVICE — SU VICRYL 1 CTX CR 8X18" J765D

## (undated) DEVICE — ESU SUCTION COAG CAUTERY HAND CONTROL 10FR 6" 30-5200

## (undated) DEVICE — ENDO BRUSH CHANNEL MASTER CLEANING 2-4.2MM BW-412T

## (undated) DEVICE — DRAPE EXTREMITY W/ARMBOARD 29405

## (undated) DEVICE — DRSG AQUACEL AG EXTRA 4X4.7" 420677

## (undated) DEVICE — BONE CEMENT MIXEVAC III HI VAC KIT  0206-015-000

## (undated) DEVICE — SUCTION MANIFOLD NEPTUNE 2 SYS 4 PORT 0702-020-000

## (undated) DEVICE — COVER LIGHT HANDLE LT-F02

## (undated) DEVICE — ESU GROUND PAD ADULT W/CORD E7507

## (undated) DEVICE — ESU PENCIL SMOKE EVAC W/ROCKER SWITCH 0703-047-000

## (undated) DEVICE — DRAPE TOP SURGICAL HD 59352

## (undated) DEVICE — CAST PADDING 6" WEBRIL II UNSTERILE 4519

## (undated) DEVICE — ENDO KIT COMPLIANCE DYKENDOCMPLY

## (undated) DEVICE — COVER TABLE L60 IN 2 TIER BACK STRL 8197-

## (undated) DEVICE — GLOVE BIOGEL 7 LATEX

## (undated) DEVICE — STRAP STIRRUP W/O SLIP 30187-020

## (undated) DEVICE — SOL WATER 1500ML

## (undated) DEVICE — BLADE REAMER 46MM STAINLESS STEEL 00597909546

## (undated) DEVICE — SU DERMABOND ADVANCED .7ML DNX12

## (undated) DEVICE — TUBING SUCTION 10'X3/16" N510

## (undated) DEVICE — DRAPE OVERHEAD TABLE

## (undated) DEVICE — ENDO TRAP POLYP E-TRAP 00711099

## (undated) DEVICE — DRILL PIN HEADLESS TROCAR 00-5901-020-00

## (undated) DEVICE — BNDG ELASTIC 6" DBL LENGTH UNSTERILE 6611-16

## (undated) DEVICE — SU VICRYL 2-0 CT-1 36" UND J945H

## (undated) DEVICE — PAD WRAP FOAM HOLDER POSITIONER KNEE DISP LF 2629-00

## (undated) DEVICE — ENDO SNARE POLYPECTOMY CRESENT 20MM LOOP SD-221U-25

## (undated) DEVICE — DRAPE TOWEL 17X27" BLUE LF DISP 28700-004

## (undated) DEVICE — BLADE SAW RECIP STRK 70X12.5X0.80MM 0277-096-277

## (undated) DEVICE — ADH SKIN CLOSURE PREMIERPRO EXOFIN 1.0ML 3470

## (undated) DEVICE — HANDPIECE INTERPULSE W/HIGH FLOW TIP & SUCTION

## (undated) DEVICE — SLEEVE COMPRESSION SCD KNEE MED 74022

## (undated) DEVICE — BLADE REAMER ZIM NEXGEN 51MM 00597909551

## (undated) DEVICE — GLOVE BIOGEL PI INDICATOR 8.0 LF 41680

## (undated) RX ORDER — BUPIVACAINE HYDROCHLORIDE 5 MG/ML
INJECTION, SOLUTION EPIDURAL; INTRACAUDAL
Status: DISPENSED
Start: 2023-03-21

## (undated) RX ORDER — CEFAZOLIN SODIUM/WATER 2 G/20 ML
SYRINGE (ML) INTRAVENOUS
Status: DISPENSED
Start: 2023-09-05

## (undated) RX ORDER — KETOROLAC TROMETHAMINE 30 MG/ML
INJECTION, SOLUTION INTRAMUSCULAR; INTRAVENOUS
Status: DISPENSED
Start: 2023-03-21

## (undated) RX ORDER — PROPOFOL 10 MG/ML
INJECTION, EMULSION INTRAVENOUS
Status: DISPENSED
Start: 2023-09-05

## (undated) RX ORDER — ONDANSETRON 2 MG/ML
INJECTION INTRAMUSCULAR; INTRAVENOUS
Status: DISPENSED
Start: 2023-09-05

## (undated) RX ORDER — NEOMYCIN/BACITRACIN/POLYMYXINB 3.5-400-5K
OINTMENT (GRAM) TOPICAL
Status: DISPENSED
Start: 2022-08-23

## (undated) RX ORDER — PROPOFOL 10 MG/ML
INJECTION, EMULSION INTRAVENOUS
Status: DISPENSED
Start: 2025-04-07

## (undated) RX ORDER — PROPOFOL 10 MG/ML
INJECTION, EMULSION INTRAVENOUS
Status: DISPENSED
Start: 2023-03-21

## (undated) RX ORDER — BUPIVACAINE HYDROCHLORIDE 5 MG/ML
INJECTION, SOLUTION EPIDURAL; INTRACAUDAL
Status: DISPENSED
Start: 2023-09-05

## (undated) RX ORDER — DEXAMETHASONE SODIUM PHOSPHATE 4 MG/ML
INJECTION, SOLUTION INTRA-ARTICULAR; INTRALESIONAL; INTRAMUSCULAR; INTRAVENOUS; SOFT TISSUE
Status: DISPENSED
Start: 2023-03-21

## (undated) RX ORDER — DEXAMETHASONE SODIUM PHOSPHATE 10 MG/ML
INJECTION, SOLUTION INTRAMUSCULAR; INTRAVENOUS
Status: DISPENSED
Start: 2023-03-21

## (undated) RX ORDER — LIDOCAINE HYDROCHLORIDE 10 MG/ML
INJECTION, SOLUTION EPIDURAL; INFILTRATION; INTRACAUDAL; PERINEURAL
Status: DISPENSED
Start: 2022-08-23

## (undated) RX ORDER — TRANEXAMIC ACID 650 MG/1
TABLET ORAL
Status: DISPENSED
Start: 2023-03-21

## (undated) RX ORDER — CELECOXIB 100 MG/1
CAPSULE ORAL
Status: DISPENSED
Start: 2023-03-21

## (undated) RX ORDER — ONDANSETRON 2 MG/ML
INJECTION INTRAMUSCULAR; INTRAVENOUS
Status: DISPENSED
Start: 2023-03-21

## (undated) RX ORDER — FENTANYL CITRATE-0.9 % NACL/PF 10 MCG/ML
PLASTIC BAG, INJECTION (ML) INTRAVENOUS
Status: DISPENSED
Start: 2025-04-07

## (undated) RX ORDER — SODIUM CHLORIDE, SODIUM LACTATE, POTASSIUM CHLORIDE, CALCIUM CHLORIDE 600; 310; 30; 20 MG/100ML; MG/100ML; MG/100ML; MG/100ML
INJECTION, SOLUTION INTRAVENOUS
Status: DISPENSED
Start: 2023-03-21

## (undated) RX ORDER — DEXAMETHASONE SODIUM PHOSPHATE 10 MG/ML
INJECTION, SOLUTION INTRAMUSCULAR; INTRAVENOUS
Status: DISPENSED
Start: 2023-09-05

## (undated) RX ORDER — TRANEXAMIC ACID 650 MG/1
TABLET ORAL
Status: DISPENSED
Start: 2023-09-05

## (undated) RX ORDER — CEFAZOLIN SODIUM/WATER 2 G/20 ML
SYRINGE (ML) INTRAVENOUS
Status: DISPENSED
Start: 2023-03-21

## (undated) RX ORDER — GINSENG 100 MG
CAPSULE ORAL
Status: DISPENSED
Start: 2024-05-22

## (undated) RX ORDER — ACETAMINOPHEN 325 MG/1
TABLET ORAL
Status: DISPENSED
Start: 2023-09-05